# Patient Record
Sex: MALE | Race: BLACK OR AFRICAN AMERICAN | Employment: OTHER | URBAN - METROPOLITAN AREA
[De-identification: names, ages, dates, MRNs, and addresses within clinical notes are randomized per-mention and may not be internally consistent; named-entity substitution may affect disease eponyms.]

---

## 2017-02-04 ENCOUNTER — APPOINTMENT (OUTPATIENT)
Dept: CT IMAGING | Age: 62
End: 2017-02-04
Attending: NURSE PRACTITIONER
Payer: COMMERCIAL

## 2017-02-04 ENCOUNTER — HOSPITAL ENCOUNTER (EMERGENCY)
Age: 62
Discharge: OTHER INSTITUTION | End: 2017-02-04
Attending: EMERGENCY MEDICINE
Payer: COMMERCIAL

## 2017-02-04 ENCOUNTER — HOSPITAL ENCOUNTER (INPATIENT)
Age: 62
LOS: 3 days | Discharge: HOME | DRG: 378 | End: 2017-02-07
Attending: EMERGENCY MEDICINE | Admitting: INTERNAL MEDICINE
Payer: COMMERCIAL

## 2017-02-04 VITALS
HEIGHT: 67 IN | WEIGHT: 232 LBS | OXYGEN SATURATION: 98 % | SYSTOLIC BLOOD PRESSURE: 125 MMHG | RESPIRATION RATE: 19 BRPM | DIASTOLIC BLOOD PRESSURE: 82 MMHG | BODY MASS INDEX: 36.41 KG/M2 | HEART RATE: 75 BPM | TEMPERATURE: 97.9 F

## 2017-02-04 DIAGNOSIS — K57.32 SIGMOID DIVERTICULITIS: Primary | ICD-10-CM

## 2017-02-04 DIAGNOSIS — K92.2 GASTROINTESTINAL HEMORRHAGE, UNSPECIFIED GASTROINTESTINAL HEMORRHAGE TYPE: Primary | ICD-10-CM

## 2017-02-04 LAB
ABO + RH BLD: NORMAL
ALBUMIN SERPL BCP-MCNC: 3.1 G/DL (ref 3.5–5)
ALBUMIN/GLOB SERPL: 0.9 {RATIO} (ref 1.1–2.2)
ALP SERPL-CCNC: 74 U/L (ref 45–117)
ALT SERPL-CCNC: 21 U/L (ref 12–78)
ANION GAP BLD CALC-SCNC: 7 MMOL/L (ref 5–15)
APPEARANCE UR: CLEAR
AST SERPL W P-5'-P-CCNC: 17 U/L (ref 15–37)
BACTERIA URNS QL MICRO: NEGATIVE /HPF
BASOPHILS # BLD AUTO: 0 K/UL (ref 0–0.1)
BASOPHILS # BLD: 0 % (ref 0–1)
BILIRUB SERPL-MCNC: 0.4 MG/DL (ref 0.2–1)
BILIRUB UR QL: NEGATIVE
BLOOD GROUP ANTIBODIES SERPL: NORMAL
BUN SERPL-MCNC: 15 MG/DL (ref 6–20)
BUN/CREAT SERPL: 11 (ref 12–20)
CALCIUM SERPL-MCNC: 7.7 MG/DL (ref 8.5–10.1)
CHLORIDE SERPL-SCNC: 105 MMOL/L (ref 97–108)
CO2 SERPL-SCNC: 28 MMOL/L (ref 21–32)
COLOR UR: ABNORMAL
CREAT SERPL-MCNC: 1.31 MG/DL (ref 0.7–1.3)
EOSINOPHIL # BLD: 0.1 K/UL (ref 0–0.4)
EOSINOPHIL NFR BLD: 1 % (ref 0–7)
EPITH CASTS URNS QL MICRO: ABNORMAL /LPF
ERYTHROCYTE [DISTWIDTH] IN BLOOD BY AUTOMATED COUNT: 15.2 % (ref 11.5–14.5)
ERYTHROCYTE [DISTWIDTH] IN BLOOD BY AUTOMATED COUNT: 15.3 % (ref 11.5–14.5)
ERYTHROCYTE [DISTWIDTH] IN BLOOD BY AUTOMATED COUNT: 15.6 % (ref 11.5–14.5)
GLOBULIN SER CALC-MCNC: 3.5 G/DL (ref 2–4)
GLUCOSE SERPL-MCNC: 137 MG/DL (ref 65–100)
GLUCOSE UR STRIP.AUTO-MCNC: NEGATIVE MG/DL
HCT VFR BLD AUTO: 37.4 % (ref 36.6–50.3)
HCT VFR BLD AUTO: 37.6 % (ref 36.6–50.3)
HCT VFR BLD AUTO: 41.6 % (ref 36.6–50.3)
HEMOCCULT STL QL: NEGATIVE
HEMOCCULT STL QL: NEGATIVE
HEMOCCULT STL QL: POSITIVE
HGB BLD-MCNC: 11.2 G/DL (ref 12.1–17)
HGB BLD-MCNC: 11.4 G/DL (ref 12.1–17)
HGB BLD-MCNC: 11.7 G/DL (ref 12.1–17)
HGB BLD-MCNC: 13.1 G/DL (ref 12.1–17)
HGB UR QL STRIP: NEGATIVE
INR PPP: 1.1 (ref 0.9–1.1)
KETONES UR QL STRIP.AUTO: ABNORMAL MG/DL
LEUKOCYTE ESTERASE UR QL STRIP.AUTO: ABNORMAL
LYMPHOCYTES # BLD AUTO: 12 % (ref 12–49)
LYMPHOCYTES # BLD AUTO: 14 % (ref 12–49)
LYMPHOCYTES # BLD AUTO: 14 % (ref 12–49)
LYMPHOCYTES # BLD: 1.1 K/UL (ref 0.8–3.5)
LYMPHOCYTES # BLD: 1.4 K/UL (ref 0.8–3.5)
LYMPHOCYTES # BLD: 1.6 K/UL (ref 0.8–3.5)
MCH RBC QN AUTO: 26.3 PG (ref 26–34)
MCH RBC QN AUTO: 26.8 PG (ref 26–34)
MCH RBC QN AUTO: 26.8 PG (ref 26–34)
MCHC RBC AUTO-ENTMCNC: 30.5 G/DL (ref 30–36.5)
MCHC RBC AUTO-ENTMCNC: 31.1 G/DL (ref 30–36.5)
MCHC RBC AUTO-ENTMCNC: 31.5 G/DL (ref 30–36.5)
MCV RBC AUTO: 85.2 FL (ref 80–99)
MCV RBC AUTO: 86 FL (ref 80–99)
MCV RBC AUTO: 86.4 FL (ref 80–99)
MONOCYTES # BLD: 0.5 K/UL (ref 0–1)
MONOCYTES # BLD: 0.6 K/UL (ref 0–1)
MONOCYTES # BLD: 0.7 K/UL (ref 0–1)
MONOCYTES NFR BLD AUTO: 5 % (ref 5–13)
MONOCYTES NFR BLD AUTO: 6 % (ref 5–13)
MONOCYTES NFR BLD AUTO: 6 % (ref 5–13)
NEUTS SEG # BLD: 7.8 K/UL (ref 1.8–8)
NEUTS SEG # BLD: 8.2 K/UL (ref 1.8–8)
NEUTS SEG # BLD: 8.6 K/UL (ref 1.8–8)
NEUTS SEG NFR BLD AUTO: 79 % (ref 32–75)
NEUTS SEG NFR BLD AUTO: 79 % (ref 32–75)
NEUTS SEG NFR BLD AUTO: 82 % (ref 32–75)
NITRITE UR QL STRIP.AUTO: NEGATIVE
PH UR STRIP: 5.5 [PH] (ref 5–8)
PLATELET # BLD AUTO: 167 K/UL (ref 150–400)
PLATELET # BLD AUTO: 172 K/UL (ref 150–400)
PLATELET # BLD AUTO: 192 K/UL (ref 150–400)
POTASSIUM SERPL-SCNC: 4.6 MMOL/L (ref 3.5–5.1)
PROT SERPL-MCNC: 6.6 G/DL (ref 6.4–8.2)
PROT UR STRIP-MCNC: NEGATIVE MG/DL
PROTHROMBIN TIME: 10.9 SEC (ref 9–11.1)
RBC # BLD AUTO: 4.33 M/UL (ref 4.1–5.7)
RBC # BLD AUTO: 4.37 M/UL (ref 4.1–5.7)
RBC # BLD AUTO: 4.88 M/UL (ref 4.1–5.7)
RBC #/AREA URNS HPF: ABNORMAL /HPF (ref 0–5)
SODIUM SERPL-SCNC: 140 MMOL/L (ref 136–145)
SP GR UR REFRACTOMETRY: 1.02 (ref 1–1.03)
SPECIMEN EXP DATE BLD: NORMAL
UA: UC IF INDICATED,UAUC: ABNORMAL
UROBILINOGEN UR QL STRIP.AUTO: 1 EU/DL (ref 0.2–1)
WBC # BLD AUTO: 10.3 K/UL (ref 4.1–11.1)
WBC # BLD AUTO: 11 K/UL (ref 4.1–11.1)
WBC # BLD AUTO: 9.5 K/UL (ref 4.1–11.1)
WBC URNS QL MICRO: ABNORMAL /HPF (ref 0–4)

## 2017-02-04 PROCEDURE — 82272 OCCULT BLD FECES 1-3 TESTS: CPT | Performed by: NURSE PRACTITIONER

## 2017-02-04 PROCEDURE — 74011250636 HC RX REV CODE- 250/636: Performed by: NURSE PRACTITIONER

## 2017-02-04 PROCEDURE — 74011000258 HC RX REV CODE- 258: Performed by: NURSE PRACTITIONER

## 2017-02-04 PROCEDURE — 86900 BLOOD TYPING SEROLOGIC ABO: CPT | Performed by: EMERGENCY MEDICINE

## 2017-02-04 PROCEDURE — 96360 HYDRATION IV INFUSION INIT: CPT

## 2017-02-04 PROCEDURE — 85610 PROTHROMBIN TIME: CPT | Performed by: NURSE PRACTITIONER

## 2017-02-04 PROCEDURE — 99285 EMERGENCY DEPT VISIT HI MDM: CPT

## 2017-02-04 PROCEDURE — 74011250636 HC RX REV CODE- 250/636: Performed by: EMERGENCY MEDICINE

## 2017-02-04 PROCEDURE — 85025 COMPLETE CBC W/AUTO DIFF WBC: CPT | Performed by: NURSE PRACTITIONER

## 2017-02-04 PROCEDURE — 80053 COMPREHEN METABOLIC PANEL: CPT | Performed by: NURSE PRACTITIONER

## 2017-02-04 PROCEDURE — 96365 THER/PROPH/DIAG IV INF INIT: CPT

## 2017-02-04 PROCEDURE — 81001 URINALYSIS AUTO W/SCOPE: CPT | Performed by: NURSE PRACTITIONER

## 2017-02-04 PROCEDURE — 74011636320 HC RX REV CODE- 636/320: Performed by: EMERGENCY MEDICINE

## 2017-02-04 PROCEDURE — 74011000250 HC RX REV CODE- 250: Performed by: INTERNAL MEDICINE

## 2017-02-04 PROCEDURE — 74174 CTA ABD&PLVS W/CONTRAST: CPT

## 2017-02-04 PROCEDURE — 36415 COLL VENOUS BLD VENIPUNCTURE: CPT | Performed by: EMERGENCY MEDICINE

## 2017-02-04 PROCEDURE — C9113 INJ PANTOPRAZOLE SODIUM, VIA: HCPCS | Performed by: NURSE PRACTITIONER

## 2017-02-04 PROCEDURE — 96361 HYDRATE IV INFUSION ADD-ON: CPT

## 2017-02-04 PROCEDURE — 36415 COLL VENOUS BLD VENIPUNCTURE: CPT | Performed by: NURSE PRACTITIONER

## 2017-02-04 PROCEDURE — 85018 HEMOGLOBIN: CPT | Performed by: INTERNAL MEDICINE

## 2017-02-04 PROCEDURE — 74011000258 HC RX REV CODE- 258: Performed by: INTERNAL MEDICINE

## 2017-02-04 PROCEDURE — 96375 TX/PRO/DX INJ NEW DRUG ADDON: CPT

## 2017-02-04 PROCEDURE — 85025 COMPLETE CBC W/AUTO DIFF WBC: CPT | Performed by: EMERGENCY MEDICINE

## 2017-02-04 PROCEDURE — 65660000000 HC RM CCU STEPDOWN

## 2017-02-04 PROCEDURE — C9113 INJ PANTOPRAZOLE SODIUM, VIA: HCPCS | Performed by: INTERNAL MEDICINE

## 2017-02-04 PROCEDURE — 74011250636 HC RX REV CODE- 250/636: Performed by: INTERNAL MEDICINE

## 2017-02-04 RX ORDER — SODIUM CHLORIDE 0.9 % (FLUSH) 0.9 %
5-10 SYRINGE (ML) INJECTION EVERY 8 HOURS
Status: DISCONTINUED | OUTPATIENT
Start: 2017-02-04 | End: 2017-02-07 | Stop reason: HOSPADM

## 2017-02-04 RX ORDER — ONDANSETRON 2 MG/ML
4 INJECTION INTRAMUSCULAR; INTRAVENOUS
Status: DISCONTINUED | OUTPATIENT
Start: 2017-02-04 | End: 2017-02-07 | Stop reason: HOSPADM

## 2017-02-04 RX ORDER — SODIUM CHLORIDE 9 MG/ML
250 INJECTION, SOLUTION INTRAVENOUS CONTINUOUS
Status: DISCONTINUED | OUTPATIENT
Start: 2017-02-04 | End: 2017-02-04 | Stop reason: HOSPADM

## 2017-02-04 RX ORDER — THERA TABS 400 MCG
1 TAB ORAL DAILY
COMMUNITY
End: 2017-02-21

## 2017-02-04 RX ORDER — SODIUM CHLORIDE 0.9 % (FLUSH) 0.9 %
10 SYRINGE (ML) INJECTION
Status: COMPLETED | OUTPATIENT
Start: 2017-02-04 | End: 2017-02-04

## 2017-02-04 RX ORDER — SODIUM CHLORIDE 0.9 % (FLUSH) 0.9 %
5-10 SYRINGE (ML) INJECTION AS NEEDED
Status: DISCONTINUED | OUTPATIENT
Start: 2017-02-04 | End: 2017-02-07 | Stop reason: HOSPADM

## 2017-02-04 RX ORDER — PANTOPRAZOLE SODIUM 40 MG/10ML
40 INJECTION, POWDER, LYOPHILIZED, FOR SOLUTION INTRAVENOUS
Status: COMPLETED | OUTPATIENT
Start: 2017-02-04 | End: 2017-02-04

## 2017-02-04 RX ORDER — METRONIDAZOLE 500 MG/100ML
500 INJECTION, SOLUTION INTRAVENOUS EVERY 8 HOURS
Status: DISCONTINUED | OUTPATIENT
Start: 2017-02-04 | End: 2017-02-06

## 2017-02-04 RX ORDER — SODIUM CHLORIDE 450 MG/100ML
100 INJECTION, SOLUTION INTRAVENOUS CONTINUOUS
Status: DISCONTINUED | OUTPATIENT
Start: 2017-02-04 | End: 2017-02-07

## 2017-02-04 RX ORDER — LEVOFLOXACIN 5 MG/ML
500 INJECTION, SOLUTION INTRAVENOUS EVERY 24 HOURS
Status: DISCONTINUED | OUTPATIENT
Start: 2017-02-04 | End: 2017-02-06

## 2017-02-04 RX ADMIN — SODIUM CHLORIDE 1000 ML: 900 INJECTION, SOLUTION INTRAVENOUS at 15:31

## 2017-02-04 RX ADMIN — Medication 10 ML: at 22:00

## 2017-02-04 RX ADMIN — SODIUM CHLORIDE 3.38 G: 900 INJECTION, SOLUTION INTRAVENOUS at 13:52

## 2017-02-04 RX ADMIN — SODIUM CHLORIDE 1000 ML: 900 INJECTION, SOLUTION INTRAVENOUS at 09:30

## 2017-02-04 RX ADMIN — PANTOPRAZOLE SODIUM 40 MG: 40 INJECTION, POWDER, FOR SOLUTION INTRAVENOUS at 11:27

## 2017-02-04 RX ADMIN — LEVOFLOXACIN 500 MG: 5 INJECTION, SOLUTION INTRAVENOUS at 21:22

## 2017-02-04 RX ADMIN — SODIUM CHLORIDE 125 ML/HR: 450 INJECTION, SOLUTION INTRAVENOUS at 21:18

## 2017-02-04 RX ADMIN — METRONIDAZOLE 500 MG: 500 INJECTION, SOLUTION INTRAVENOUS at 23:03

## 2017-02-04 RX ADMIN — Medication 10 ML: at 13:02

## 2017-02-04 RX ADMIN — IOPAMIDOL 100 ML: 755 INJECTION, SOLUTION INTRAVENOUS at 13:02

## 2017-02-04 RX ADMIN — SODIUM CHLORIDE 250 ML/HR: 900 INJECTION, SOLUTION INTRAVENOUS at 11:32

## 2017-02-04 RX ADMIN — SODIUM CHLORIDE 8 MG/HR: 900 INJECTION, SOLUTION INTRAVENOUS at 21:13

## 2017-02-04 NOTE — ED NOTES
TRANSFER - OUT REPORT:    Verbal report given to Gisell Todd RN(name) on Suresh Rowe  [facility] care       Report consisted of patients Situation, Background, Assessment and   Recommendations(SBAR). Information from the following report(s) SBAR, ED Summary, [facility] and Recent Results was reviewed with the receiving nurse. Lines:   Peripheral IV 02/04/17 Right Antecubital (Active)   Site Assessment Clean, dry, & intact 2/4/2017  9:30 AM   Phlebitis Assessment 0 2/4/2017  9:30 AM   Infiltration Assessment 0 2/4/2017  9:30 AM   Dressing Status Clean, dry, & intact 2/4/2017  9:30 AM        Opportunity for questions and clarification was provided.       Patient transported with:   Monitor

## 2017-02-04 NOTE — ED NOTES
Patient states he has been seeing blood in his stool since about 10:00 PM last night, states he has had 4 BM since last night that are diarrhea and states instead of a BM he states it just blood

## 2017-02-04 NOTE — ED NOTES
Emergency Department Nursing Plan of Care       The Nursing Plan of Care is developed from the Nursing assessment and Emergency Department Attending provider initial evaluation. The plan of care may be reviewed in the ED Provider note.     The Plan of Care was developed with the following considerations:   Patient / Family readiness to learn indicated by:verbalized understanding  Persons(s) to be included in education: patient  Barriers to Learning/Limitations:No    Signed     Marge Ch RN    2/4/2017   9:08 AM

## 2017-02-04 NOTE — IP AVS SNAPSHOT
Current Discharge Medication List  
  
Take these medications at their scheduled times Dose & Instructions Dispensing Information Comments Morning Noon Evening Bedtime  
 amLODIPine 5 mg tablet Commonly known as:  Cameron Squires Your next dose is: Today, Tomorrow Other:  ____________ Dose:  5 mg Take 1 Tab by mouth daily. Indications: hypertension Quantity:  30 Tab Refills:  1  
     
   
   
   
  
 levoFLOXacin 500 mg tablet Commonly known as:  Graeme Stacy Your next dose is: Today, Tomorrow Other:  ____________ Dose:  500 mg Take 1 Tab by mouth every twenty-four (24) hours for 7 days. Quantity:  7 Tab Refills:  0  
     
   
   
   
  
 metroNIDAZOLE 500 mg tablet Commonly known as:  FLAGYL Your next dose is: Today, Tomorrow Other:  ____________ Dose:  500 mg Take 1 Tab by mouth three (3) times daily for 8 days. Quantity:  24 Tab Refills:  0  
     
   
   
   
  
 pantoprazole 40 mg tablet Commonly known as:  PROTONIX Your next dose is: Today, Tomorrow Other:  ____________ Dose:  40 mg Take 1 Tab by mouth Daily (before breakfast). Quantity:  30 Tab Refills:  1 therapeutic multivitamin tablet Commonly known as:  [facility] Your next dose is: Today, Tomorrow Other:  ____________ Dose:  1 Tab Take 1 Tab by mouth daily. Refills:  0 Where to Get Your Medications Information about where to get these medications is not yet available ! Ask your nurse or doctor about these medications  
  amLODIPine 5 mg tablet  
 levoFLOXacin 500 mg tablet  
 metroNIDAZOLE 500 mg tablet  
 pantoprazole 40 mg tablet

## 2017-02-04 NOTE — ED NOTES
TRANSFER - OUT REPORT:    Verbal report given to [employer] (name) on Lizbeth Nicholas  being transferred to [facility]. (unit) for routine progression of care       Report consisted of patients Situation, Background, Assessment and   Recommendations(SBAR). Information from the following report(s) SBAR was reviewed with the receiving nurse. Lines:   Peripheral IV 02/04/17 Right Antecubital (Active)   Site Assessment Clean, dry, & intact 2/4/2017  9:30 AM   Phlebitis Assessment 0 2/4/2017  9:30 AM   Infiltration Assessment 0 2/4/2017  9:30 AM   Dressing Status Clean, dry, & intact 2/4/2017  9:30 AM        Opportunity for questions and clarification was provided.       Patient transported with:   Monitor

## 2017-02-04 NOTE — ED TRIAGE NOTES
Triage: Pt arrives as transfer from [facility] [place] [place] for GI bleed.

## 2017-02-04 NOTE — PROGRESS NOTES
Admission Medication Reconciliation:    Information obtained from: patient interview, rx query    Significant PMH/Disease States:   Past Medical History   Diagnosis Date    Arthritis      OSTEO    Gastrointestinal disorder      acid reflux    GERD (gastroesophageal reflux disease)     History of seasonal allergies      \"HAY FEVER\"    Hypertension     Unspecified sleep apnea      USES CPAP       Chief Complaint for this Admission:  rectal bleeding    Allergies:  Review of patient's allergies indicates no known allergies. Prior to Admission Medications:   Prior to Admission Medications   Prescriptions Last Dose Informant Patient Reported? Taking? amLODIPine (NORVASC) 5 mg tablet 2/1/2017 at Unknown time  No Yes   Sig: Take 1 Tab by mouth daily. Indications: HYPERTENSION   pantoprazole (PROTONIX) 40 mg tablet 2/3/2017 at 10pm  No Yes   Sig: Take 1 Tab by mouth Daily (before breakfast). therapeutic multivitamin (THERAGRAN) tablet 2/3/2017 at Unknown time  Yes Yes   Sig: Take 1 Tab by mouth daily. Facility-Administered Medications: None     Comments/Recommendations: All medications/allergies have been reviewed and updated; last medication administration times reviewed and recorded. Pt states he ran out of his Norvasc, last dose two days ago. Changes made to Prior to Admission (PTA) Medication List:     Medications Added:  - MVI     Medications Changed:  - None     Medications Removed:  - None    Thank you for allowing me to participate in the care of this patient. If there are any further questions, please contact the pharmacy at  or the medication reconciliation pharmacist at . Brice Mas, Pharm. D., [facility]

## 2017-02-04 NOTE — IP AVS SNAPSHOT
[address] [facility] 
[phone] Patient: Saul Richardson MRN: [id number] SJ You are allergic to the following No active allergies Recent Documentation Height Weight BMI Smoking Status 1.702 m 99.3 kg 34.29 kg/m2 Never Smoker Unresulted Labs Order Current Status SAMPLE TO BLOOD BANK In process Emergency Contacts Name Discharge Info Relation Home Work Mobile Zuleyka Saenz  Girlfriend [18]   [phone] Angela Rodriguez  Other Relative [6]   [phone] About your hospitalization You were admitted on:  2017 You last received care in the:  [facility] NEURO-SCI TELE You were discharged on:  2017 Unit phone number:  [phone] Why you were hospitalized Your primary diagnosis was:  Gi Bleed Your diagnoses also included:  Diverticulitis Of Sigmoid Colon Providers Seen During Your Hospitalizations Provider Role Specialty Primary office phone Barbara Siemens, MD Attending Provider Emergency Medicine [phone] Bridgette Dee MD Attending Provider Internal Medicine [phone] Leticia Sanchez MD Attending Provider Internal Medicine [phone] Sarika Garcia MD Attending Provider Internal Medicine [phone] Your Primary Care Physician (PCP) Primary Care Physician Office Phone Office Fax Say MILLS [phone] Follow-up Information Follow up With Details Comments Contact Info Anjana Nova MD  As needed Nikol[address] AZAR [address]. 
 
[facility] [address] [id number] 
[phone] Sandy Verde MD In 4 weeks  [address] [address] [address] [phone] Current Discharge Medication List  
  
START taking these medications Dose & Instructions Dispensing Information Comments Morning Noon Evening Bedtime  
 levoFLOXacin 500 mg tablet Commonly known as:  Aida Foote Your next dose is: Today, Tomorrow Other:  _________ Dose:  500 mg Take 1 Tab by mouth every twenty-four (24) hours for 7 days. Quantity:  7 Tab Refills:  0  
     
   
   
   
  
 metroNIDAZOLE 500 mg tablet Commonly known as:  FLAGYL Your next dose is: Today, Tomorrow Other:  _________ Dose:  500 mg Take 1 Tab by mouth three (3) times daily for 8 days. Quantity:  24 Tab Refills:  0 CONTINUE these medications which have NOT CHANGED Dose & Instructions Dispensing Information Comments Morning Noon Evening Bedtime  
 amLODIPine 5 mg tablet Commonly known as:  Elian Webb Your next dose is: Today, Tomorrow Other:  _________ Dose:  5 mg Take 1 Tab by mouth daily. Indications: hypertension Quantity:  30 Tab Refills:  1  
     
   
   
   
  
 pantoprazole 40 mg tablet Commonly known as:  PROTONIX Your next dose is: Today, Tomorrow Other:  _________ Dose:  40 mg Take 1 Tab by mouth Daily (before breakfast). Quantity:  30 Tab Refills:  1 therapeutic multivitamin tablet Commonly known as:  [facility] Your next dose is: Today, Tomorrow Other:  _________ Dose:  1 Tab Take 1 Tab by mouth daily. Refills:  0 Where to Get Your Medications Information on where to get these meds will be given to you by the nurse or doctor. ! Ask your nurse or doctor about these medications  
  amLODIPine 5 mg tablet  
 levoFLOXacin 500 mg tablet  
 metroNIDAZOLE 500 mg tablet  
 pantoprazole 40 mg tablet Discharge Instructions Discharge Instructions PATIENT ID: Daisha Culp MRN: [id number] YOB: 1955 DATE OF ADMISSION: 2/4/2017  3:10 PM   
 DATE OF DISCHARGE: 2/6/2017 PRIMARY CARE PROVIDER: Flossie Goldberg, MD  
ATTENDING PHYSICIAN: Jaylen Lobato MD 
DISCHARGING PROVIDER: Vamshi Dover NP. To contact this individual call [phone] and ask the  to page. If unavailable ask to be transferred the Adult Hospitalist Department. DISCHARGE DIAGNOSES Diverticulitis with Bleeding CONSULTATIONS: IP CONSULT TO GASTROENTEROLOGY 
IP CONSULT TO HOSPITALIST FOLLOW UP APPOINTMENTS:  
Follow-up Information Follow up With Details Comments Contact Info Flossie Goldberg, MD  As needed Nikol[address] AZAR 97. 
 
[facility] [address] [id number] 
[phone] Lenard Ching MD In 4 weeks  [address] [place] [address] [address] [place] [address] Avenue 
[phone] ADDITIONAL CARE RECOMMENDATIONS:  
Complete full course of antibiotics: both levaquin and flagyl Call Dr. Anna Chisholm office if: 
- you have bloody or black tarry stools  
- you develop fevers or intensifying abdominal pain DIET: GI light, low fiber diet for next 5-7 days. Add fiber back to diet and strive for higher fiber foods once pain is resolved. - Avoid foods with nuts or seeds. NO Popcorn unless kernel/gauthier free. ACTIVITY: Activity as tolerated · It is important that you take the medication exactly as they are prescribed. · Keep your medication in the bottles provided by the pharmacist and keep a list of the medication names, dosages, and times to be taken in your wallet. · Do not take other medications without consulting your doctor. NOTIFY YOUR PHYSICIAN FOR ANY OF THE FOLLOWING:  
Fever over 101 degrees for 24 hours. Chest pain, shortness of breath, fever, chills, nausea, vomiting, diarrhea, change in mentation, falling, weakness, bleeding. Severe pain or pain not relieved by medications. Or, any other signs or symptoms that you may have questions about.  
 
DISPOSITION: 
  Home With: 
 OT  PT  [facility]  RN  
  
 SNF/Inpatient Rehab/LTAC  
 Independent/assisted living Hospice  
xx Other: Home CDMP Checked:  
Yes *x* PROBLEM LIST Updated: 
Yes *x* Signed:  
Tita Morejon NP 
2/6/2017 
8:32 PM 
 
 
Discharge Orders None Introducing [facility]! Jonathan Feldman introduces [employer] patient portal. Now you can access parts of your medical record, email your doctor's office, and request medication refills online. 1. In your internet browser, go to [email or web address] 2. Click on the First Time User? Click Here link in the Sign In box. You will see the New Member Sign Up page. 3. Enter your [employer] Access Code exactly as it appears below. You will not need to use this code after youve completed the sign-up process. If you do not sign up before the expiration date, you must request a new code. · [employer] Access Code: [facility] Expires: 5/5/2017  8:56 AM 
 
4. Enter the last four digits of your Social Security Number (xxxx) and Date of Birth (mm/dd/yyyy) as indicated and click Submit. You will be taken to the next sign-up page. 5. Create a [employer] ID. This will be your [employer] login ID and cannot be changed, so think of one that is secure and easy to remember. 6. Create a [employer] password. You can change your password at any time. 7. Enter your Password Reset Question and Answer. This can be used at a later time if you forget your password. 8. Enter your e-mail address. You will receive e-mail notification when new information is available in [address] 9. Click Sign Up. You can now view and download portions of your medical record. 10. Click the Download Summary menu link to download a portable copy of your medical information. If you have questions, please visit the Frequently Asked Questions section of the [employer] website. Remember, [employer] is NOT to be used for urgent needs. For medical emergencies, dial 911. Now available from your iPhone and Android! General Information Please provide this summary of care documentation to your next provider. Patient Signature:  ____________________________________________________________ Date:  ____________________________________________________________  
  
[place]  Provider Signature:  ____________________________________________________________ Date:  ____________________________________________________________

## 2017-02-04 NOTE — PROGRESS NOTES
TRANSFER - IN REPORT:    Verbal report received from Dari(name) on Endy Wiley  being received from ER(unit) for routine progression of care      Report consisted of patients Situation, Background, Assessment and   Recommendations(SBAR). Information from the following report(s) SBAR was reviewed with the receiving nurse. Opportunity for questions and clarification was provided. Assessment completed upon patients arrival to unit and care assumed.

## 2017-02-04 NOTE — H&P
History & Physical    Primary Care Provider: Vin Pyle MD  Source of Information: Patient     History of Presenting Illness:   Jeff Guzman is a 63 y.o. male who presents with bloody stools  Pt started to have bloody stools with clots since last night   Was seen in [facility] and as tx here  Pt denies any abd pain or NSAID use     The patient denies any fever, chills, chest pain, cough, congestion, recent illness, palpitations, or dysuria. In ed, hb stable     Gi been consulted       Review of Systems:  A comprehensive review of systems was negative except for that written in the History of Present Illness. Past Medical History   Diagnosis Date    Arthritis      OSTEO    Gastrointestinal disorder      acid reflux    GERD (gastroesophageal reflux disease)     History of seasonal allergies      \"HAY FEVER\"    Hypertension     Unspecified sleep apnea      USES CPAP      Past Surgical History   Procedure Laterality Date    Hx other surgical       left eye implant     Hx other surgical       KIDNEY STONES REMOVED    Hx tonsil and adenoidectomy      Hx orthopaedic       BOOGIE KNEES    Hx orthopaedic       2014 rt thumb nerve release    Pr abdomen surgery proc unlisted       Gall bladder removed 2014     Prior to Admission medications    Medication Sig Start Date End Date Taking? Authorizing Provider   therapeutic multivitamin [facility]) tablet Take 1 Tab by mouth daily. Yes Historical Provider   pantoprazole (PROTONIX) 40 mg tablet Take 1 Tab by mouth Daily (before breakfast). 7/18/16  Yes Britney Brown NP   amLODIPine (NORVASC) 5 mg tablet Take 1 Tab by mouth daily.  Indications: HYPERTENSION 7/17/16  Yes Emma Armando NP     No Known Allergies   Family History   Problem Relation Age of Onset    Diabetes Mother     Cancer Father      THROAT    Diabetes Sister     Diabetes Maternal Aunt     Alzheimer Maternal Uncle     Diabetes Maternal Aunt SOCIAL HISTORY:  Patient resides:  Independently x   Assisted Living    SNF    With family care       Smoking history:   None x   Former    Chronic      Alcohol history:   None x   Social    Chronic      Ambulates:   Independently x   w/cane    w/walker    w/wc    CODE STATUS:  DNR    Full x   Other      Objective:     Physical Exam:   PHYSICAL EXAM:  VITALS:    Visit Vitals    /76    Pulse 71    Temp 97.7 °F (36.5 °C)    Resp 18    Ht 5' 7\" (1.702 m)    Wt 105.7 kg (233 lb)    SpO2 99%    BMI 36.49 kg/m2     General:  Alert, cooperative, no distress, appears stated age. Head:  Normocephalic, without obvious abnormality, atraumatic. Eyes:  Conjunctivae/corneas clear. PERRL, EOMs intact. Nose:  Nares normal. Septum midline. Mucosa normal. No drainage or sinus tenderness. Throat:  Lips, mucosa, and tongue normal. Teeth and gums normal.    Neck:  Supple, symmetrical, trachea midline, no adenopathy, thyroid: no enlargement/tenderness/nodules, no carotid bruit and no JVD. Back:  Symmetric, no curvature. ROM normal. No CVA tenderness. Lungs:  Clear to auscultation bilaterally. Chest wall:  No tenderness or deformity. Heart:  Regular rate and rhythm, S1, S2 normal, no murmur, click, rub or gallop. Abdomen:  Soft, non-tender. Bowel sounds normal. No masses, No organomegaly. Obese    Extremities:  Extremities normal, atraumatic, no cyanosis or edema. Pulses:  2+ and symmetric all extremities. Skin:  Skin color, texture, turgor normal. No rashes or lesions    Neurologic:  CNII-XII intact.  Moves all extremity  No focal deficits       LAB DATA REVIEWED:    Recent Results (from the past 24 hour(s))   CBC WITH AUTOMATED DIFF    Collection Time: 02/04/17  9:30 AM   Result Value Ref Range    WBC 9.5 4.1 - 11.1 K/uL    RBC 4.88 4.10 - 5.70 M/uL    HGB 13.1 12.1 - 17.0 g/dL    HCT 41.6 36.6 - 50.3 %    MCV 85.2 80.0 - 99.0 FL    MCH 26.8 26.0 - 34.0 PG    MCHC 31.5 30.0 - 36.5 g/dL RDW 15.6 (H) 11.5 - 14.5 %    PLATELET 988 796 - 826 K/uL    NEUTROPHILS 82 (H) 32 - 75 %    LYMPHOCYTES 12 12 - 49 %    MONOCYTES 5 5 - 13 %    EOSINOPHILS 1 0 - 7 %    BASOPHILS 0 0 - 1 %    ABS. NEUTROPHILS 7.8 1.8 - 8.0 K/UL    ABS. LYMPHOCYTES 1.1 0.8 - 3.5 K/UL    ABS. MONOCYTES 0.5 0.0 - 1.0 K/UL    ABS. EOSINOPHILS 0.1 0.0 - 0.4 K/UL    ABS. BASOPHILS 0.0 0.0 - 0.1 K/UL   METABOLIC PANEL, COMPREHENSIVE    Collection Time: 02/04/17  9:30 AM   Result Value Ref Range    Sodium 140 136 - 145 mmol/L    Potassium 4.6 3.5 - 5.1 mmol/L    Chloride 105 97 - 108 mmol/L    CO2 28 21 - 32 mmol/L    Anion gap 7 5 - 15 mmol/L    Glucose 137 (H) 65 - 100 mg/dL    BUN 15 6 - 20 MG/DL    Creatinine 1.31 (H) 0.70 - 1.30 MG/DL    BUN/Creatinine ratio 11 (L) 12 - 20      GFR est AA >60 >60 ml/min/1.73m2    GFR est non-AA 56 (L) >60 ml/min/1.73m2    Calcium 7.7 (L) 8.5 - 10.1 MG/DL    Bilirubin, total 0.4 0.2 - 1.0 MG/DL    ALT (SGPT) 21 12 - 78 U/L    AST (SGOT) 17 15 - 37 U/L    Alk.  phosphatase 74 45 - 117 U/L    Protein, total 6.6 6.4 - 8.2 g/dL    Albumin 3.1 (L) 3.5 - 5.0 g/dL    Globulin 3.5 2.0 - 4.0 g/dL    A-G Ratio 0.9 (L) 1.1 - 2.2     PROTHROMBIN TIME + INR    Collection Time: 02/04/17  9:30 AM   Result Value Ref Range    INR 1.1 0.9 - 1.1      Prothrombin time 10.9 9.0 - 11.1 sec   OCCULT BLOOD, STOOL    Collection Time: 02/04/17  9:30 AM   Result Value Ref Range    Occult blood, stool NEGATIVE  NEG     URINALYSIS W/ REFLEX CULTURE    Collection Time: 02/04/17 10:30 AM   Result Value Ref Range    Color DARK YELLOW      Appearance CLEAR CLEAR      Specific gravity 1.025 1.003 - 1.030      pH (UA) 5.5 5.0 - 8.0      Protein NEGATIVE  NEG mg/dL    Glucose NEGATIVE  NEG mg/dL    Ketone TRACE (A) NEG mg/dL    Bilirubin NEGATIVE  NEG      Blood NEGATIVE  NEG      Urobilinogen 1.0 0.2 - 1.0 EU/dL    Nitrites NEGATIVE  NEG      Leukocyte Esterase TRACE (A) NEG      WBC 0-4 0 - 4 /hpf    RBC 0-5 0 - 5 /hpf    Epithelial cells FEW FEW /lpf    Bacteria NEGATIVE  NEG /hpf    UA:UC IF INDICATED CULTURE NOT INDICATED BY UA RESULT CNI     OCCULT BLOOD, STOOL    Collection Time: 02/04/17 11:36 AM   Result Value Ref Range    Occult blood, stool NEGATIVE  NEG     OCCULT BLOOD, STOOL    Collection Time: 02/04/17 12:15 PM   Result Value Ref Range    Occult blood, stool POSITIVE (A) NEG     CBC WITH AUTOMATED DIFF    Collection Time: 02/04/17 12:38 PM   Result Value Ref Range    WBC 10.3 4.1 - 11.1 K/uL    RBC 4.33 4.10 - 5.70 M/uL    HGB 11.4 (L) 12.1 - 17.0 g/dL    HCT 37.4 36.6 - 50.3 %    MCV 86.4 80.0 - 99.0 FL    MCH 26.3 26.0 - 34.0 PG    MCHC 30.5 30.0 - 36.5 g/dL    RDW 15.3 (H) 11.5 - 14.5 %    PLATELET 375 112 - 705 K/uL    NEUTROPHILS 79 (H) 32 - 75 %    LYMPHOCYTES 14 12 - 49 %    MONOCYTES 6 5 - 13 %    EOSINOPHILS 1 0 - 7 %    BASOPHILS 0 0 - 1 %    ABS. NEUTROPHILS 8.2 (H) 1.8 - 8.0 K/UL    ABS. LYMPHOCYTES 1.4 0.8 - 3.5 K/UL    ABS. MONOCYTES 0.6 0.0 - 1.0 K/UL    ABS. EOSINOPHILS 0.1 0.0 - 0.4 K/UL    ABS. BASOPHILS 0.0 0.0 - 0.1 K/UL   TYPE & SCREEN    Collection Time: 02/04/17  3:19 PM   Result Value Ref Range    Crossmatch Expiration 02/07/2017     ABO/Rh(D) Maxcine Kya POSITIVE     Antibody screen NEG    CBC WITH AUTOMATED DIFF    Collection Time: 02/04/17  3:19 PM   Result Value Ref Range    WBC 11.0 4.1 - 11.1 K/uL    RBC 4.37 4.10 - 5.70 M/uL    HGB 11.7 (L) 12.1 - 17.0 g/dL    HCT 37.6 36.6 - 50.3 %    MCV 86.0 80.0 - 99.0 FL    MCH 26.8 26.0 - 34.0 PG    MCHC 31.1 30.0 - 36.5 g/dL    RDW 15.2 (H) 11.5 - 14.5 %    PLATELET 474 605 - 883 K/uL    NEUTROPHILS 79 (H) 32 - 75 %    LYMPHOCYTES 14 12 - 49 %    MONOCYTES 6 5 - 13 %    EOSINOPHILS 1 0 - 7 %    BASOPHILS 0 0 - 1 %    ABS. NEUTROPHILS 8.6 (H) 1.8 - 8.0 K/UL    ABS. LYMPHOCYTES 1.6 0.8 - 3.5 K/UL    ABS. MONOCYTES 0.7 0.0 - 1.0 K/UL    ABS. EOSINOPHILS 0.1 0.0 - 0.4 K/UL    ABS.  BASOPHILS 0.0 0.0 - 0.1 K/UL             Data Review:     Recent Days:  Recent Labs 02/04/17   1519  02/04/17   1238  02/04/17   0930   WBC  11.0  10.3  9.5   HGB  11.7*  11.4*  13.1   HCT  37.6  37.4  41.6   PLT  172  167  192     Recent Labs      02/04/17   0930   NA  140   K  4.6   CL  105   CO2  28   GLU  137*   BUN  15   CREA  1.31*   CA  7.7*   ALB  3.1*   SGOT  17   ALT  21   INR  1.1     No results for input(s): PH, PCO2, PO2, HCO3, FIO2 in the last 72 hours. 24 Hour Results:  Recent Results (from the past 24 hour(s))   CBC WITH AUTOMATED DIFF    Collection Time: 02/04/17  9:30 AM   Result Value Ref Range    WBC 9.5 4.1 - 11.1 K/uL    RBC 4.88 4.10 - 5.70 M/uL    HGB 13.1 12.1 - 17.0 g/dL    HCT 41.6 36.6 - 50.3 %    MCV 85.2 80.0 - 99.0 FL    MCH 26.8 26.0 - 34.0 PG    MCHC 31.5 30.0 - 36.5 g/dL    RDW 15.6 (H) 11.5 - 14.5 %    PLATELET 214 396 - 713 K/uL    NEUTROPHILS 82 (H) 32 - 75 %    LYMPHOCYTES 12 12 - 49 %    MONOCYTES 5 5 - 13 %    EOSINOPHILS 1 0 - 7 %    BASOPHILS 0 0 - 1 %    ABS. NEUTROPHILS 7.8 1.8 - 8.0 K/UL    ABS. LYMPHOCYTES 1.1 0.8 - 3.5 K/UL    ABS. MONOCYTES 0.5 0.0 - 1.0 K/UL    ABS. EOSINOPHILS 0.1 0.0 - 0.4 K/UL    ABS. BASOPHILS 0.0 0.0 - 0.1 K/UL   METABOLIC PANEL, COMPREHENSIVE    Collection Time: 02/04/17  9:30 AM   Result Value Ref Range    Sodium 140 136 - 145 mmol/L    Potassium 4.6 3.5 - 5.1 mmol/L    Chloride 105 97 - 108 mmol/L    CO2 28 21 - 32 mmol/L    Anion gap 7 5 - 15 mmol/L    Glucose 137 (H) 65 - 100 mg/dL    BUN 15 6 - 20 MG/DL    Creatinine 1.31 (H) 0.70 - 1.30 MG/DL    BUN/Creatinine ratio 11 (L) 12 - 20      GFR est AA >60 >60 ml/min/1.73m2    GFR est non-AA 56 (L) >60 ml/min/1.73m2    Calcium 7.7 (L) 8.5 - 10.1 MG/DL    Bilirubin, total 0.4 0.2 - 1.0 MG/DL    ALT (SGPT) 21 12 - 78 U/L    AST (SGOT) 17 15 - 37 U/L    Alk.  phosphatase 74 45 - 117 U/L    Protein, total 6.6 6.4 - 8.2 g/dL    Albumin 3.1 (L) 3.5 - 5.0 g/dL    Globulin 3.5 2.0 - 4.0 g/dL    A-G Ratio 0.9 (L) 1.1 - 2.2     PROTHROMBIN TIME + INR    Collection Time: 02/04/17  9:30 AM   Result Value Ref Range    INR 1.1 0.9 - 1.1      Prothrombin time 10.9 9.0 - 11.1 sec   OCCULT BLOOD, STOOL    Collection Time: 02/04/17  9:30 AM   Result Value Ref Range    Occult blood, stool NEGATIVE  NEG     URINALYSIS W/ REFLEX CULTURE    Collection Time: 02/04/17 10:30 AM   Result Value Ref Range    Color DARK YELLOW      Appearance CLEAR CLEAR      Specific gravity 1.025 1.003 - 1.030      pH (UA) 5.5 5.0 - 8.0      Protein NEGATIVE  NEG mg/dL    Glucose NEGATIVE  NEG mg/dL    Ketone TRACE (A) NEG mg/dL    Bilirubin NEGATIVE  NEG      Blood NEGATIVE  NEG      Urobilinogen 1.0 0.2 - 1.0 EU/dL    Nitrites NEGATIVE  NEG      Leukocyte Esterase TRACE (A) NEG      WBC 0-4 0 - 4 /hpf    RBC 0-5 0 - 5 /hpf    Epithelial cells FEW FEW /lpf    Bacteria NEGATIVE  NEG /hpf    UA:UC IF INDICATED CULTURE NOT INDICATED BY UA RESULT CNI     OCCULT BLOOD, STOOL    Collection Time: 02/04/17 11:36 AM   Result Value Ref Range    Occult blood, stool NEGATIVE  NEG     OCCULT BLOOD, STOOL    Collection Time: 02/04/17 12:15 PM   Result Value Ref Range    Occult blood, stool POSITIVE (A) NEG     CBC WITH AUTOMATED DIFF    Collection Time: 02/04/17 12:38 PM   Result Value Ref Range    WBC 10.3 4.1 - 11.1 K/uL    RBC 4.33 4.10 - 5.70 M/uL    HGB 11.4 (L) 12.1 - 17.0 g/dL    HCT 37.4 36.6 - 50.3 %    MCV 86.4 80.0 - 99.0 FL    MCH 26.3 26.0 - 34.0 PG    MCHC 30.5 30.0 - 36.5 g/dL    RDW 15.3 (H) 11.5 - 14.5 %    PLATELET 114 546 - 645 K/uL    NEUTROPHILS 79 (H) 32 - 75 %    LYMPHOCYTES 14 12 - 49 %    MONOCYTES 6 5 - 13 %    EOSINOPHILS 1 0 - 7 %    BASOPHILS 0 0 - 1 %    ABS. NEUTROPHILS 8.2 (H) 1.8 - 8.0 K/UL    ABS. LYMPHOCYTES 1.4 0.8 - 3.5 K/UL    ABS. MONOCYTES 0.6 0.0 - 1.0 K/UL    ABS. EOSINOPHILS 0.1 0.0 - 0.4 K/UL    ABS.  BASOPHILS 0.0 0.0 - 0.1 K/UL   TYPE & SCREEN    Collection Time: 02/04/17  3:19 PM   Result Value Ref Range    Crossmatch Expiration 02/07/2017     ABO/Rh(D) O POSITIVE     Antibody screen NEG    CBC WITH AUTOMATED DIFF    Collection Time: 02/04/17  3:19 PM   Result Value Ref Range    WBC 11.0 4.1 - 11.1 K/uL    RBC 4.37 4.10 - 5.70 M/uL    HGB 11.7 (L) 12.1 - 17.0 g/dL    HCT 37.6 36.6 - 50.3 %    MCV 86.0 80.0 - 99.0 FL    MCH 26.8 26.0 - 34.0 PG    MCHC 31.1 30.0 - 36.5 g/dL    RDW 15.2 (H) 11.5 - 14.5 %    PLATELET 340 875 - 422 K/uL    NEUTROPHILS 79 (H) 32 - 75 %    LYMPHOCYTES 14 12 - 49 %    MONOCYTES 6 5 - 13 %    EOSINOPHILS 1 0 - 7 %    BASOPHILS 0 0 - 1 %    ABS. NEUTROPHILS 8.6 (H) 1.8 - 8.0 K/UL    ABS. LYMPHOCYTES 1.6 0.8 - 3.5 K/UL    ABS. MONOCYTES 0.7 0.0 - 1.0 K/UL    ABS. EOSINOPHILS 0.1 0.0 - 0.4 K/UL    ABS.  BASOPHILS 0.0 0.0 - 0.1 K/UL         Imaging:   CT acute sigmoid diverticulitis     Assessment:     Principal Problem:    GI bleed (2/4/2017)           Plan:     GI bleed POA  Likely from divertiluclitis  - NPo   - monitor h & h   - PPI  - gi on board     HTN   - hold meds      Diet:NPO  Activity:   DVT prophylaxis: scds  Isolation precautions: none  Consultations: GI   Anticipated disposition:        Signed By: Quin Barger MD     February 4, 2017

## 2017-02-04 NOTE — ED PROVIDER NOTES
HPI Comments: Marleen Griffin is a 63 y.o. male presents to [facility] ED with cc of blood in his stool noted 10pm last night had four bowel movements since then states'now stool is just blood' HX GI bleed taking protonix   Patient specifically denies fever, chills, nausea, vomitng, chest pain,abdominal pain shortness of breath, headache, rash, diarrhea ,sweating or weight loss. PCP: Martina Cornejo MD GI Dr Mane Gay      Patient is a 63 y.o. male presenting with anal bleeding. The history is provided by the patient. No  was used. Rectal Bleeding    This is a new problem. The current episode started 6 to 12 hours ago. Stool description: bloody. Pertinent negatives include no abdominal pain, no dysuria, no chills, no fever, no back pain and no vomiting. He has tried nothing for the symptoms. Past Medical History:   Diagnosis Date    Arthritis      OSTEO    Gastrointestinal disorder      acid reflux    GERD (gastroesophageal reflux disease)     History of seasonal allergies      \"HAY FEVER\"    Hypertension     Unspecified sleep apnea      USES CPAP       Past Surgical History:   Procedure Laterality Date    Hx other surgical       left eye implant     Hx other surgical       KIDNEY STONES REMOVED    Hx tonsil and adenoidectomy      Hx orthopaedic       BOOGIE KNEES    Hx orthopaedic       2014 rt thumb nerve release    Pr abdomen surgery proc unlisted       Gall bladder removed 2014         Family History:   Problem Relation Age of Onset    Diabetes Mother     Cancer Father      THROAT    Diabetes Sister     Diabetes Maternal Aunt     Alzheimer Maternal Uncle     Diabetes Maternal Aunt        Social History     Social History    Marital status: SINGLE     Spouse name: N/A    Number of children: N/A    Years of education: N/A     Occupational History    Not on file.      Social History Main Topics    Smoking status: Never Smoker    Smokeless tobacco: Never Used   [facility] Alcohol use No    Drug use: No    Sexual activity: Not on file     Other Topics Concern    Not on file     Social History Narrative         ALLERGIES: Review of patient's allergies indicates no known allergies. Review of Systems   Constitutional: Negative for chills, fatigue and fever. HENT: Negative for congestion and sore throat. Eyes: Negative for redness. Respiratory: Negative for cough, chest tightness and wheezing. Cardiovascular: Negative for chest pain. Gastrointestinal: Positive for blood in stool. Negative for abdominal pain and vomiting. Genitourinary: Negative for dysuria. Musculoskeletal: Negative for arthralgias, back pain, myalgias, neck pain and neck stiffness. Skin: Negative for rash. Neurological: Negative for dizziness, syncope, weakness, light-headedness, numbness and headaches. Hematological: Negative for adenopathy. Psychiatric/Behavioral: Negative for agitation and behavioral problems. All other systems reviewed and are negative. Vitals:    02/04/17 1133 02/04/17 1136 02/04/17 1138 02/04/17 1200   BP: 127/74 128/78 135/82 132/79   Pulse: 77 72 82 72   Resp:  18 14 19   Temp:       SpO2:  97% 97% 99%   Weight:       Height:                Physical Exam   Constitutional: He is oriented to person, place, and time. He appears well-developed and well-nourished. HENT:   Head: Normocephalic and atraumatic. Right Ear: External ear normal.   Mouth/Throat: Oropharynx is clear and moist.   Eyes: Conjunctivae are normal. Right eye exhibits no discharge. Left eye exhibits no discharge. Neck: Normal range of motion. Neck supple. Cardiovascular: Normal rate, regular rhythm and normal heart sounds. Pulmonary/Chest: Effort normal and breath sounds normal. No respiratory distress. He has no wheezes. Abdominal: Soft. Bowel sounds are normal. There is no tenderness. Genitourinary: Rectal exam shows guaiac positive stool.    Musculoskeletal: Normal range of motion. He exhibits no edema. Lymphadenopathy:     He has no cervical adenopathy. Neurological: He is alert and oriented to person, place, and time. No cranial nerve deficit. Skin: Skin is warm and dry. Psychiatric: He has a normal mood and affect. His behavior is normal. Judgment and thought content normal.   Nursing note and vitals reviewed. MDM  Number of Diagnoses or Management Options  Sigmoid diverticulitis:   Diagnosis management comments: DDX gastritis PUD rectal mass        Amount and/or Complexity of Data Reviewed  Clinical lab tests: ordered and reviewed  Tests in the radiology section of CPT®: ordered and reviewed  Discuss the patient with other providers: yes      ED Course       Procedures  1240 large amount of bloody stool noted in bedside comode  Discussed with Dr bAdi Gilbert ED [facility] order ct angio call with results  Patient is being transferred to [facility]  ED , transfer accepted by Dr Owens Reach report discussed   The reasons for their transfer have been discussed with them and available family.  They convey agreement and understanding for the need to be transferred as explained to them by this provider    IMPRESSION  Sigmoid diverticulitis  PLAN  Transfer to [facility] ED

## 2017-02-04 NOTE — ED PROVIDER NOTES
HPI Comments: 63 y.o. male with past medical history significant for GERD, HTN, arthritis, sleep apnea, and seasonal allergies who presents from [facility] as a transfer via EMS with chief complaint of rectal bleeding. Pt states he noticed dark, black stool with some bright red blood last night at 2000. Pt has had 6 episodes of the stool with his last episode occurring at [facility] prior to being transferred. Pt also complains of mild lower abdominal and flank pain, intermittent right-sided chest pain and SOB. Pt states chest pain feels \"like gas\" and is similar to the pain he experienced with his last ulcer in July 2016. Pt denies radiation elsewhere. Pt states he is compliant with medications for his ulcers. Pt recently had a normal cardiac stress test. Pt denies a history of angina and MI. Pt denies seeing a cardiologist. Pt denies taking asa, NSAIDs and naproxen. Pt denies n/v, diaphoresis, fever and chills. There are no other acute medical concerns at this time. Old Chart Review:  Pt was seen at [facility] today with reports of 4 bloody bowel movements since 2000 last night. Pt was transferred here for CT angio. Pt's CT at [facility] indicated acute sigmoid diverticulitis. No fluid collection to suggest abscess. No evidence of active GI bleeding. Small periumbilical hernia. Pt was given Protonix, Zosyn, and 2L of fluid. Social hx: denies tobacco use; denies EtOH use; denies illicit drug use  PCP: Haleigh De Jesus MD  GI: Dr. Phyllis Casanova    Note written by Jose Membreno, as dictated by Alberto Torres MD 3:26 PM     The history is provided by the patient.         Past Medical History:   Diagnosis Date    Arthritis      OSTEO    Gastrointestinal disorder      acid reflux    GERD (gastroesophageal reflux disease)     History of seasonal allergies      \"HAY FEVER\"    Hypertension     Unspecified sleep apnea      USES CPAP       Past Surgical History: Procedure Laterality Date    Hx other surgical       left eye implant     Hx other surgical       KIDNEY STONES REMOVED    Hx tonsil and adenoidectomy      Hx orthopaedic       BOOGIE KNEES    Hx orthopaedic       2014 rt thumb nerve release    Pr abdomen surgery proc unlisted       Gall bladder removed 2014         Family History:   Problem Relation Age of Onset    Diabetes Mother     Cancer Father      THROAT    Diabetes Sister     Diabetes Maternal Aunt     Alzheimer Maternal Uncle     Diabetes Maternal Aunt        Social History     Social History    Marital status: SINGLE     Spouse name: N/A    Number of children: N/A    Years of education: N/A     Occupational History    Not on file. Social History Main Topics    Smoking status: Never Smoker    Smokeless tobacco: Never Used    Alcohol use No    Drug use: No    Sexual activity: Not on file     Other Topics Concern    Not on file     Social History Narrative         ALLERGIES: Review of patient's allergies indicates no known allergies. Review of Systems   Constitutional: Negative for chills, diaphoresis and fever. HENT: Negative for congestion, postnasal drip, rhinorrhea and sore throat. Eyes: Negative for photophobia, discharge, redness and visual disturbance. Respiratory: Positive for shortness of breath. Negative for cough, chest tightness and wheezing. Cardiovascular: Positive for chest pain. Negative for palpitations and leg swelling. Gastrointestinal: Positive for abdominal pain, anal bleeding and blood in stool. Negative for abdominal distention, constipation, diarrhea, nausea and vomiting. Genitourinary: Negative for difficulty urinating, dysuria, frequency, hematuria and urgency. Musculoskeletal: Negative for arthralgias, back pain, joint swelling and myalgias. Skin: Negative for color change and rash.    Neurological: Negative for dizziness, speech difficulty, weakness, light-headedness, numbness and headaches. Psychiatric/Behavioral: Negative for confusion. The patient is not nervous/anxious. All other systems reviewed and are negative. There were no vitals filed for this visit. Physical Exam   Constitutional: He is oriented to person, place, and time. He appears well-developed and well-nourished. No distress. HENT:   Head: Normocephalic and atraumatic. Right Ear: External ear normal.   Left Ear: External ear normal.   Nose: Nose normal.   Mouth/Throat: Oropharynx is clear and moist.   Eyes: Conjunctivae and EOM are normal. Pupils are equal, round, and reactive to light. No scleral icterus. Neck: Normal range of motion. Neck supple. No JVD present. No tracheal deviation present. No thyromegaly present. Cardiovascular: Normal rate, regular rhythm and normal heart sounds. Exam reveals no gallop and no friction rub. No murmur heard. Pulmonary/Chest: Effort normal and breath sounds normal. No respiratory distress. He has no decreased breath sounds. He has no wheezes. He has no rhonchi. He has no rales. He exhibits no tenderness. Abdominal: Soft. He exhibits no distension and no mass. Bowel sounds are decreased. There is no tenderness. There is no rebound and no guarding. Musculoskeletal: Normal range of motion. He exhibits no edema or tenderness. Lymphadenopathy:     He has no cervical adenopathy. Neurological: He is alert and oriented to person, place, and time. He has normal strength. He displays no atrophy and no tremor. No cranial nerve deficit. He exhibits normal muscle tone. Coordination and gait normal.   Skin: Skin is warm and dry. No rash noted. He is not diaphoretic. No erythema. Psychiatric: He has a normal mood and affect. His behavior is normal. Judgment and thought content normal.   Nursing note and vitals reviewed.   Note written by Jose Plata, as dictated by Daryle Lamer, MD 3:26 PM       MDM  Number of Diagnoses or Management Options  Gastrointestinal hemorrhage, unspecified gastrointestinal hemorrhage type:   Diagnosis management comments: Impression:  GI bleed with component of melena and dark red blood. Hx of PUD and on CT show diverticulitis (not osis and not right sided findings). Thus consider a repeat upper GI bleed with brisk bleeding to account for the dark red blood. Will discuss with Dr. Angela Muniz And admit to hospitalist service. ED Course       Procedures  CONSULT NOTE:  3:45 PM Stephan Manriquez MD spoke with Dr. Asif Onofre, Consult for Gastroenterology. Discussed available diagnostic tests and clinical findings. He is in agreement with care plans as outlined. Dr. Asif Onofre recommends evaluating the patient in the ED. CONSULT NOTE:  Ivelisse Elizabeth MD spoke with Dr. Patricia Barrios, Consult for Hospitalist.  Discussed available diagnostic tests and clinical findings. She is in agreement with care plans as outlined. Dr. Patricia Barrios recommends admitting the patient.

## 2017-02-05 PROBLEM — I10 HYPERTENSION: Chronic | Status: ACTIVE | Noted: 2017-02-05

## 2017-02-05 PROBLEM — K57.32 DIVERTICULITIS OF SIGMOID COLON: Status: ACTIVE | Noted: 2017-02-05

## 2017-02-05 LAB
HGB BLD-MCNC: 10.5 G/DL (ref 12.1–17)
HGB BLD-MCNC: 9.1 G/DL (ref 12.1–17)
HGB BLD-MCNC: 9.3 G/DL (ref 12.1–17)
HGB BLD-MCNC: 9.3 G/DL (ref 12.1–17)

## 2017-02-05 PROCEDURE — 36415 COLL VENOUS BLD VENIPUNCTURE: CPT | Performed by: INTERNAL MEDICINE

## 2017-02-05 PROCEDURE — 74011000258 HC RX REV CODE- 258: Performed by: INTERNAL MEDICINE

## 2017-02-05 PROCEDURE — 74011250636 HC RX REV CODE- 250/636: Performed by: INTERNAL MEDICINE

## 2017-02-05 PROCEDURE — 65660000000 HC RM CCU STEPDOWN

## 2017-02-05 PROCEDURE — 85018 HEMOGLOBIN: CPT | Performed by: INTERNAL MEDICINE

## 2017-02-05 PROCEDURE — C9113 INJ PANTOPRAZOLE SODIUM, VIA: HCPCS | Performed by: INTERNAL MEDICINE

## 2017-02-05 PROCEDURE — 74011000250 HC RX REV CODE- 250: Performed by: INTERNAL MEDICINE

## 2017-02-05 RX ADMIN — SODIUM CHLORIDE 8 MG/HR: 900 INJECTION, SOLUTION INTRAVENOUS at 22:57

## 2017-02-05 RX ADMIN — METRONIDAZOLE 500 MG: 500 INJECTION, SOLUTION INTRAVENOUS at 15:30

## 2017-02-05 RX ADMIN — Medication 10 ML: at 22:57

## 2017-02-05 RX ADMIN — SODIUM CHLORIDE 8 MG/HR: 900 INJECTION, SOLUTION INTRAVENOUS at 17:43

## 2017-02-05 RX ADMIN — METRONIDAZOLE 500 MG: 500 INJECTION, SOLUTION INTRAVENOUS at 07:20

## 2017-02-05 RX ADMIN — SODIUM CHLORIDE 8 MG/HR: 900 INJECTION, SOLUTION INTRAVENOUS at 00:52

## 2017-02-05 RX ADMIN — Medication 10 ML: at 06:15

## 2017-02-05 RX ADMIN — SODIUM CHLORIDE 8 MG/HR: 900 INJECTION, SOLUTION INTRAVENOUS at 06:30

## 2017-02-05 RX ADMIN — METRONIDAZOLE 500 MG: 500 INJECTION, SOLUTION INTRAVENOUS at 22:58

## 2017-02-05 RX ADMIN — LEVOFLOXACIN 500 MG: 5 INJECTION, SOLUTION INTRAVENOUS at 17:45

## 2017-02-05 RX ADMIN — SODIUM CHLORIDE 8 MG/HR: 900 INJECTION, SOLUTION INTRAVENOUS at 12:17

## 2017-02-05 RX ADMIN — Medication 5 ML: at 14:00

## 2017-02-05 NOTE — PROGRESS NOTES
0327: pt had large bright red loose bloody bowel movement in commode, 's, 's. Pt denies pain at this time  0549: pt had medium red melena/bloody stool in the bed/floor while attempting to use the urinal  0635: pt had another medium bright red & melena/bloody stool, became diaphoretic and dizzy when getting back to bed from bathroom, 's  0735: Bedside and Verbal shift change report given to Francesco Ko (oncoming nurse) by Andres Valentine RN (offgoing nurse). Report included the following information SBAR, Kardex, Intake/Output, MAR, Accordion, Recent Results, Med Rec Status and Cardiac Rhythm siuns rhythm/sinus tach.

## 2017-02-05 NOTE — PROGRESS NOTES
63 yr old male presents with LLQ pain and hematochezia. CTA consistent with acute sigmoid diverticulitis. We will treat him with antibiotics. Clear liquid diet. Follow serial hemoglobins and transfuse if needed. We will re-evaluate tomorrow.

## 2017-02-05 NOTE — PROGRESS NOTES
Problem: Falls - Risk of  Goal: *Absence of falls  Outcome: Progressing Towards Goal  chato scale complete, pt instructed to use call bell for assistance when needed. Call bell and bedside table within reach. Slip resistant foot wear provided/applied.   Bed rails up x2    Problem: Pressure Ulcer - Risk of  Goal: *Prevention of pressure ulcer  Outcome: Progressing Towards Goal  Skin intact, pt turns self- self care    Problem: Upper and Lower GI Bleed: Day 1  Goal: *Optimal pain control at patients stated goal  Outcome: Progressing Towards Goal  Pt denies pain at this time  Goal: *Hemodynamically stable  Outcome: Progressing Towards Goal  Sinus rhythm, -130's  Goal: *Demonstrates progressive activity  Outcome: Progressing Towards Goal  Up ad mihir, self care

## 2017-02-05 NOTE — CONSULTS
[address]   [address] [facility], [address]   [address] [facility]       Name:  Rupesh Barkley   MR#:  [id number]   :  1955   Account #:  [de-identified]    Date of Consultation:  2017   Date of Adm:  2017       REASON FOR CONSULTATION: Dr. Isak Lerner asked   us to evaluate the patient for hematochezia and left lower quadrant   pain. CHIEF COMPLAINT:  Hematochezia for one day. HISTORY OF PRESENT ILLNESS: The patient is a 53-year-old male   who presented to the emergency room with complaints of multiple   episodes of hematochezia since last night. He noticed moderate left   lower quadrant pain which began abruptly yesterday evening. Pain   was crampy and sharp. The pain is associated with multiple episodes   of hematochezia when he passed dark red blood. He states that he   had at least 4-6 episodes of hematochezia yesterday and one episode   today. He was seen at [facility] and was then   transferred to [facility] for further evaluation. A CT was   performed, which revealed acute sigmoid diverticulitis and he has   already been started on antibiotics by Dr. Elena Akins. PAST MEDICAL HISTORY: Significant for osteoarthritis, GERD, hay   fever, hypertension, sleep apnea, left eye implant, kidney stones,   tonsillectomy and adenoidectomy, orthopedic surgeries,   cholecystectomy in . MEDICATIONS AT HOME INCLUDE   1. Bobby Terry. 2. Protonix. 3. Norvasc. FAMILY HISTORY: His mother had diabetes and father had throat   cancer. SOCIAL HISTORY: He does not smoke or drink alcohol. REVIEW OF SYSTEMS   CONSTITUTIONAL: Negative for anorexia, fever, weight loss. GASTROINTESTINAL: Positive for abdominal pain. Positive for   hematochezia. RESPIRATORY: No cough or expectoration. CARDIOVASCULAR: No chest pain, syncope, palpitations. CENTRAL NERVOUS SYSTEM:  No history of seizures or loss   consciousness.    Review of lymphatic, hematologic, musculoskeletal, genitourinary,   endocrine and metabolic systems revealed no abnormalities. All other   systems are negative. PHYSICAL EXAMINATION   GENERAL: He is alert, comfortable. VITAL SIGNS: Blood pressure 134/71 pulse of 71, temperature 97.7. Height   is 5 feet 7 inches. HEAD, EYES, EARS, NOSE AND THROAT:  Pupils are equal, react to   light. Extraocular movements normal.   NECK: Supple. There is no carotid bruit or jugular venous distention. CHEST: Clear to auscultation. No crackles or wheeze. CARDIAC: Regular rate and rhythm. First and second sounds normal.   ABDOMEN: Soft. There is mild tenderness in the left lower quadrant. There is no rigidity, no rebound, no palpable masses. Bowel sounds   are present. CENTRAL NERVOUS SYSTEM: He is alert and oriented x3. There is   no gross sensory or motor neurological deficit. EXTREMITIES: Negative for cyanosis, clubbing or edema. LABORATORY DATA: White count is 11.0, hemoglobin of 11.7,   platelets of 034. Glucose 137, creatinine is 1.31. BUN is 15. Liver   enzymes are normal.    ASSESSMENT: A 56-year-old male has presented with a history of left   lower quadrant pain and multiple episodes of hematochezia. CT   angiogram is consistent with acute sigmoid diverticulitis. We will treat   him with IV Levaquin and Flagyl as already ordered by Dr. Neris Dacosta. We will also start him on clear liquids. If his pain   subsides, his diet will be advanced as tolerated. We will also follow his   hemoglobins and transfuse if his hemoglobin drops significantly. RECOMMENDATIONS   1. Continue Levaquin and Flagyl in prescribed doses. 2. Clear liquids p.o.   3. Follow serial hemoglobins and transfuse. 4. Dr. Ching Wright partners will resume care on Monday. Thank you for the consultation.         Jerry Boston MD      PK / DV   D:  02/04/2017   21:41   T:  02/05/2017   00:14   Job #:  [id number]

## 2017-02-05 NOTE — PROGRESS NOTES
Hospitalist Progress Note  Lazara Olivares NP  Office: [phone]  Cell: [phone]      Date of Service:  2017  NAME:  Daniela Meyer  :  1955  MRN:  [id number]      Admission Summary:   Daniela Meyer is a 63 y.o. male who was transferred from [facility] on 17. Pt had 4 episodes of hematochezia and LLQ pain with passage of bloody stool. Abd ct scan revealed acute sigmoid diverticulitis. Interval history / Subjective:      - noted pt had bloody bm last night per nursing notes, continue serial hgb, transfuse as necessary    Assessment & Plan:     Acute GI bleed (POA) likely 2/2 acute sigmoid diverticulitis  - ct abd => Acute sigmoid diverticulitis. No fluid collection to suggest abscess. No  evidence of active GI bleeding. Small periumbilical hernia. - gi following, normally follows with Dr. Lisa Alcantara  - clear liquids  - protonix drip, levaquin, flagyl  - hh q6 hours    GIO (POA)  - suspect prerenal with fluid loss  - cr 1.31 on admission, continue to hydrate and recheck tomorrow    HTN   - hold home medication in anticipation of possible hypotension 2/2 acute blood loss    Code status: Full  DVT prophylaxis: SCDs  Care Plan discussed with: patient, Dr. Christian Macias  Disposition: TBD, lives home alone     Hospital Problems  Date Reviewed: 2017          Codes Class Noted POA    * (Principal)GI bleed ICD-10-CM: K92.2  ICD-9-CM: 578.9  2017 Unknown                Review of Systems:   Respiratory: negative  Cardiovascular: negative  Gastrointestinal: positive for hematochezia  Genitourinary:negative   Denies any fever/chills. Vital Signs:    Last 24hrs VS reviewed since prior progress note.  Most recent are:  Visit Vitals    /59 (BP 1 Location: Left arm, BP Patient Position: At rest)    Pulse (!) 106    Temp 98.7 °F (37.1 °C)    Resp 17    Ht 5' 7\" (1.702 m)    Wt 97.5 kg (214 lb 15.2 oz)    SpO2 98%    BMI 33.67 kg/m2 Intake/Output Summary (Last 24 hours) at 02/05/17 5156  Last data filed at 02/05/17 0400   Gross per 24 hour   Intake          1225.33 ml   Output              850 ml   Net           375.33 ml        Physical Examination:             Constitutional:  No acute distress, cooperative, pleasant    ENT:  Oral mucous moist, oropharynx benign. Neck supple,    Resp:  CTA bilaterally. No wheezing/rhonchi/rales. No accessory muscle use   CV:  Regular rhythm, normal rate, no murmurs, gallops, rubs. SR on tele    GI:  Soft, non distended, + bilateral lower quadrants, r>l. Normoactive bowel sounds, no hepatosplenomegaly     Musculoskeletal:  No edema, warm, 2+ pulses throughout. 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 AAOx3, CN II-XII reviewed              Psych:  Good insight, Not anxious nor agitated. Skin:  Good turgor, no rashes or ulcers       Data Review:    Review and/or order of clinical lab test  Review and/or order of tests in the radiology section of CPT  Review and/or order of tests in the medicine section of CPT      Labs:     Recent Labs      02/05/17   0348  02/04/17   2129  02/04/17   1519  02/04/17   1238   WBC   --    --   11.0  10.3   HGB  10.5*  11.2*  11.7*  11.4*   HCT   --    --   37.6  37.4   PLT   --    --   172  167     Recent Labs      02/04/17   0930   NA  140   K  4.6   CL  105   CO2  28   BUN  15   CREA  1.31*   GLU  137*   CA  7.7*     Recent Labs      02/04/17   0930   SGOT  17   ALT  21   AP  74   TBILI  0.4   TP  6.6   ALB  3.1*   GLOB  3.5     Recent Labs      02/04/17   0930   INR  1.1   PTP  10.9      No results for input(s): FE, TIBC, PSAT, FERR in the last 72 hours. No results found for: FOL, RBCF   No results for input(s): PH, PCO2, PO2 in the last 72 hours. No results for input(s): CPK, CKNDX, TROIQ in the last 72 hours.     No lab exists for component: CPKMB  No results found for: CHOL, [address], CHLST, [address], HDL, LDL, DLDL, LDLC, DLDLP, TGL, TGLX, TRIGL, TRIGP, CHHD, CHHDX  Lab Results   Component Value Date/Time    Glucose (POC) 104 11/30/2010 06:10 PM     Lab Results   Component Value Date/Time    Color DARK YELLOW 02/04/2017 10:30 AM    Appearance CLEAR 02/04/2017 10:30 AM    Specific gravity 1.025 02/04/2017 10:30 AM    pH (UA) 5.5 02/04/2017 10:30 AM    Protein NEGATIVE  02/04/2017 10:30 AM    Glucose NEGATIVE  02/04/2017 10:30 AM    Ketone TRACE 02/04/2017 10:30 AM    Bilirubin NEGATIVE  02/04/2017 10:30 AM    Urobilinogen 1.0 02/04/2017 10:30 AM    Nitrites NEGATIVE  02/04/2017 10:30 AM    Leukocyte Esterase TRACE 02/04/2017 10:30 AM    Epithelial cells FEW 02/04/2017 10:30 AM    Bacteria NEGATIVE  02/04/2017 10:30 AM    WBC 0-4 02/04/2017 10:30 AM    RBC 0-5 02/04/2017 10:30 AM         Medications Reviewed:     Current Facility-Administered Medications   Medication Dose Route Frequency    0.45% sodium chloride infusion  125 mL/hr IntraVENous CONTINUOUS    pantoprazole (PROTONIX) 40 mg in 0.9% sodium chloride (MBP/ADV) 50 mL  8 mg/hr IntraVENous CONTINUOUS    levoFLOXacin (LEVAQUIN) 500 mg in D5W IVPB  500 mg IntraVENous Q24H    metroNIDAZOLE (FLAGYL) IVPB premix 500 mg  500 mg IntraVENous Q8H    sodium chloride (NS) flush 5-10 mL  5-10 mL IntraVENous Q8H    sodium chloride (NS) flush 5-10 mL  5-10 mL IntraVENous PRN    ondansetron (ZOFRAN) injection 4 mg  4 mg IntraVENous Q4H PRN     ______________________________________________________________________  EXPECTED LENGTH OF STAY: - - -  ACTUAL LENGTH OF STAY:          1                 Marline Connors V, NP

## 2017-02-05 NOTE — PROGRESS NOTES
[address] [facility] [address], [address] [facility]                GI PROGRESS NOTE        NAME:   Soo Guadarrama       :    1955       MRN:    [id number]     Assessment/Plan   1. LGI bleed: likley secondary to acute diverticulitis. Bleeding appears to be slowing down. Continue antibiotics for 10 days. Dr Min Marshall partners to follow in AM.  2. Acute blood loss anemia: Hb 9.1,follow serial hemoglobins and transfuse if needed. 3. HTN     Patient Active Problem List   Diagnosis Code    GI bleed K92.2    Hypertension I10    Diverticulitis of sigmoid colon K57.32       Subjective:     Soo Guadarrama is a 63 y.o.  male who was admitted with LGI bleed     Review of Systems    Constitutional: negative fever, negative chills, negative weight loss  Eyes:   negative visual changes  ENT:   negative sore throat, tongue or lip swelling  Respiratory:  negative cough, negative dyspnea  Cards:  negative for chest pain, palpitations, lower extremity edema  GI:   See HPI  :  negative for frequency, dysuria  Integument:  negative for rash and pruritus  Heme:  negative for easy bruising and gum/nose bleeding  Musculoskel: negative for myalgias,  back pain and muscle weakness  Neuro: negative for headaches, dizziness, vertigo  Psych:  negative for feelings of anxiety, depression           Objective:     VITALS:   Last 24hrs VS reviewed since prior hospitalist progress note.  Most recent are:  Visit Vitals    /77 (BP 1 Location: Left arm, BP Patient Position: At rest)    Pulse 88    Temp 97.6 °F (36.4 °C)    Resp 20    Ht 5' 7\" (1.702 m)    Wt 97.5 kg (214 lb 15.2 oz)    SpO2 98%    BMI 33.67 kg/m2       Intake/Output Summary (Last 24 hours) at 17 1528  Last data filed at 17 0720   Gross per 24 hour   Intake          1730.33 ml   Output              850 ml   Net           880.33 ml        PHYSICAL EXAM:  General   well developed, well nourished, appears stated age, in no acute distress  EENT  Normocephalic, Atraumatic, PERRLA, EOMI, sclera clear, nares clear, pharynx normal  Neck   Supple without nodes or mass. No thyromegaly or bruit  Respiratory   Clear To Auscultation bilaterally - no wheezes, rales, rhonchi, or crackles  Cardiology  Regular Rate and Rythmn  - no murmurs, rubs or gallops  Abdominal  Soft, non-tender, non-distended, positive bowel sounds, no hepatosplenomegaly, no palpable mass  Extremities  No clubbing, cyanosis, or edema. Pulses intact. Skin  Normal skin turgor. No rashes or skin ulcers noted  Neurological  No focal neurological deficits noted  Psychological  Oriented x 3. Normal affect.        Lab Data   Recent Results (from the past 12 hour(s))   HEMOGLOBIN    Collection Time: 02/05/17  3:48 AM   Result Value Ref Range    HGB 10.5 (L) 12.1 - 17.0 g/dL   HEMOGLOBIN    Collection Time: 02/05/17 10:56 AM   Result Value Ref Range    HGB 9.1 (L) 12.1 - 17.0 g/dL         Medications Reviewed    PMH/ reviewed - no change compared to H&P  Attending Physician: Anuja Chapa MD   Date/Time:  2/5/2017    ________________________________________________________________________

## 2017-02-06 PROBLEM — K57.32 DIVERTICULITIS OF SIGMOID COLON: Status: RESOLVED | Noted: 2017-02-05 | Resolved: 2017-02-06

## 2017-02-06 PROBLEM — K92.2 GI BLEED: Status: RESOLVED | Noted: 2017-02-04 | Resolved: 2017-02-06

## 2017-02-06 LAB
ANION GAP BLD CALC-SCNC: 9 MMOL/L (ref 5–15)
BUN SERPL-MCNC: 11 MG/DL (ref 6–20)
BUN/CREAT SERPL: 10 (ref 12–20)
CALCIUM SERPL-MCNC: 7.4 MG/DL (ref 8.5–10.1)
CHLORIDE SERPL-SCNC: 108 MMOL/L (ref 97–108)
CO2 SERPL-SCNC: 25 MMOL/L (ref 21–32)
CREAT SERPL-MCNC: 1.07 MG/DL (ref 0.7–1.3)
GLUCOSE SERPL-MCNC: 98 MG/DL (ref 65–100)
HGB BLD-MCNC: 8.8 G/DL (ref 12.1–17)
HGB BLD-MCNC: 8.9 G/DL (ref 12.1–17)
POTASSIUM SERPL-SCNC: 3.8 MMOL/L (ref 3.5–5.1)
SODIUM SERPL-SCNC: 142 MMOL/L (ref 136–145)

## 2017-02-06 PROCEDURE — 74011000250 HC RX REV CODE- 250: Performed by: INTERNAL MEDICINE

## 2017-02-06 PROCEDURE — 74011000258 HC RX REV CODE- 258: Performed by: INTERNAL MEDICINE

## 2017-02-06 PROCEDURE — 74011250637 HC RX REV CODE- 250/637: Performed by: NURSE PRACTITIONER

## 2017-02-06 PROCEDURE — 74011250636 HC RX REV CODE- 250/636: Performed by: INTERNAL MEDICINE

## 2017-02-06 PROCEDURE — 36415 COLL VENOUS BLD VENIPUNCTURE: CPT | Performed by: INTERNAL MEDICINE

## 2017-02-06 PROCEDURE — 74011000258 HC RX REV CODE- 258: Performed by: NURSE PRACTITIONER

## 2017-02-06 PROCEDURE — 85018 HEMOGLOBIN: CPT | Performed by: INTERNAL MEDICINE

## 2017-02-06 PROCEDURE — 80048 BASIC METABOLIC PNL TOTAL CA: CPT | Performed by: INTERNAL MEDICINE

## 2017-02-06 PROCEDURE — 65660000000 HC RM CCU STEPDOWN

## 2017-02-06 PROCEDURE — C9113 INJ PANTOPRAZOLE SODIUM, VIA: HCPCS | Performed by: INTERNAL MEDICINE

## 2017-02-06 RX ORDER — LEVOFLOXACIN 500 MG/1
500 TABLET, FILM COATED ORAL EVERY 24 HOURS
Qty: 7 TAB | Refills: 0 | Status: SHIPPED | OUTPATIENT
Start: 2017-02-06 | End: 2017-02-13

## 2017-02-06 RX ORDER — AMLODIPINE BESYLATE 5 MG/1
5 TABLET ORAL DAILY
Qty: 30 TAB | Refills: 1 | Status: SHIPPED | OUTPATIENT
Start: 2017-02-06 | End: 2017-02-21

## 2017-02-06 RX ORDER — METRONIDAZOLE 500 MG/1
500 TABLET ORAL 3 TIMES DAILY
Qty: 24 TAB | Refills: 0 | Status: SHIPPED | OUTPATIENT
Start: 2017-02-06 | End: 2017-02-14

## 2017-02-06 RX ORDER — PANTOPRAZOLE SODIUM 40 MG/1
40 TABLET, DELAYED RELEASE ORAL
Qty: 30 TAB | Refills: 1 | Status: SHIPPED | OUTPATIENT
Start: 2017-02-06

## 2017-02-06 RX ORDER — LEVOFLOXACIN 500 MG/1
500 TABLET, FILM COATED ORAL EVERY 24 HOURS
Status: DISCONTINUED | OUTPATIENT
Start: 2017-02-06 | End: 2017-02-07 | Stop reason: HOSPADM

## 2017-02-06 RX ORDER — PANTOPRAZOLE SODIUM 40 MG/1
40 TABLET, DELAYED RELEASE ORAL
Status: DISCONTINUED | OUTPATIENT
Start: 2017-02-07 | End: 2017-02-07 | Stop reason: HOSPADM

## 2017-02-06 RX ORDER — METRONIDAZOLE 250 MG/1
500 TABLET ORAL 3 TIMES DAILY
Status: DISCONTINUED | OUTPATIENT
Start: 2017-02-06 | End: 2017-02-07 | Stop reason: HOSPADM

## 2017-02-06 RX ADMIN — METRONIDAZOLE 500 MG: 500 INJECTION, SOLUTION INTRAVENOUS at 06:08

## 2017-02-06 RX ADMIN — Medication 10 ML: at 14:51

## 2017-02-06 RX ADMIN — METRONIDAZOLE 500 MG: 250 TABLET ORAL at 22:53

## 2017-02-06 RX ADMIN — SODIUM CHLORIDE 100 ML/HR: 450 INJECTION, SOLUTION INTRAVENOUS at 14:51

## 2017-02-06 RX ADMIN — SODIUM CHLORIDE 8 MG/HR: 900 INJECTION, SOLUTION INTRAVENOUS at 04:19

## 2017-02-06 RX ADMIN — SODIUM CHLORIDE 8 MG/HR: 900 INJECTION, SOLUTION INTRAVENOUS at 09:36

## 2017-02-06 RX ADMIN — Medication 10 ML: at 22:53

## 2017-02-06 RX ADMIN — Medication 10 ML: at 06:08

## 2017-02-06 RX ADMIN — SODIUM CHLORIDE 125 ML/HR: 450 INJECTION, SOLUTION INTRAVENOUS at 04:19

## 2017-02-06 RX ADMIN — LEVOFLOXACIN 500 MG: 500 TABLET, FILM COATED ORAL at 16:05

## 2017-02-06 RX ADMIN — METRONIDAZOLE 500 MG: 250 TABLET ORAL at 16:05

## 2017-02-06 NOTE — PROGRESS NOTES
Bedside and Verbal shift change report given to [address] [place] [address] (oncoming nurse) by Jeremiah Galeazzi (offgoing nurse).  Report included the following information SBAR, Kardex, MAR, Recent Results and Cardiac Rhythm SR.

## 2017-02-06 NOTE — CDMP QUERY
The diagnosis of \"GIO\" has been documented for your patient. Currently, the documentation does not meet  [place] Criteria used by [facility] as a reference for this dx. Please document the the criteria if using another reference (ex. YASMIN, KDIGO). ...or ruled out, or can the diagnosis be clarified as:    =>Acute renal insufficiency in the setting of increase in Cr suspect of pre-renal w/fluid loss 2/2 GI bleeding requiring hydration. =>Other Explanation of clinical findings  =>Unable to Determine (no explanation of clinical findings)    Current Documentation: Pt. transferred from [facility] w/GI bleed. Cr on adm. noted to be 1.31 w/GFR 56 on 2/4. Down to 1.07 w/GFR back up to >60 on 2/6. Hx of Cr noted to be around 1.12-1.25 w/GFR 59->60.             Thank you,    Zelda Morin RN, BSN, [facility] [address], [address] [place]  [phone]

## 2017-02-06 NOTE — PROGRESS NOTES
Transition RN to the Bedside to assist Primary RN with patient education, medication educations, discharge instructions, and stroke core measure compliance. Discharge concerns initiated and discussed with patient, including clarification on \"who\" assists the patient at their home and instructions for when the home going patient should call their provider after discharge. Opportunity for questions and clarification was provided. Patient receptive to education: YES  Patient stated: \"I am not sure when I am going home. \"  Barriers to Education: none  Diagnosis Education given:  YES    Length of stay: 2  Expected Day of Discharge: 2    Education Day #: 1    Medication Education Given:  YES  M in the box Medication name: flagyl, levaquin, protonix,       Stroke Education documented in Patient Education: NO  Core Measures Documented in [facility]:  Risk Factors: NO  Warning signs of stroke: NO  When to Activate 911: NO  Medication Education for Risk Factors: NO  Smoking cessation if applicable: NO  Written Education Given:  NO    Discharge NIH Completed: NO  Score: na    BRAINS: NO    Follow Up Appointment Made: NO  Date/Time if applicable: TBD

## 2017-02-06 NOTE — PROGRESS NOTES
Patient is a 62 y/o single male insured by [facility], admitted to [facility] 2/4/17 for GI bleed. CM visited patient bedside. Patient A&Ox4, cooperative, engaged. Confirmed demographics on facesheet. Main mailing address is [address], confirmed physical address [address], [address]. Patient lives alone, independent with all ADL/IADL's, works full-time for [employer]. Patient last saw PCP Dr. Mercado approx. 3 months ago, follows regularly with Dr. Shantelle Arevalo for GI issues. Patient hoping to discharge home today or tomorrow, no further CM interventions indicated. Patient denied further questions/concerns for CM. He will ask friend or relative to provide d/c transport home. Discharge home with no further CM needs. CM available for consult should new needs arise. HENRIQUE Parikh/LIV    Care Management Interventions  PCP Verified by CM:  Yes (estimates last visit 3 months ago)  Mode of Transport at Discharge:  (friend/family)  [place] Signup: No  Discharge Durable Medical Equipment: No  Physical Therapy Consult: No  Occupational Therapy Consult: No  Speech Therapy Consult: No  Current Support Network: Lives Alone  Confirm Follow Up Transport: Self  Plan discussed with Pt/Family/Caregiver: Yes  Discharge Location  Discharge Placement: Home

## 2017-02-06 NOTE — PROGRESS NOTES
Hospitalist Progress Note  Joslyn Lugo NP  Office: [phone]  Cell: [phone]      Date of Service:  2017  NAME:  Liu Thorne  :  1955  MRN:  [id number]    Admission Summary:   Pt presented to [facility] as a transfer from [address] [place] [address] on 17. Pt reported 4 episodes of hematochezia and LLQ pain. Abd CT scan revealed acute sigmoid diverticulitis. Interval history / Subjective:      Pt in bed, reports a small loose non bloody BM yesterday. Tolerating clear liquids without pain and or N/V. Assessment & Plan:     Acute GI bleed (POA) likely 2/2 acute sigmoid diverticulitis:  - CT abd => Acute sigmoid diverticulitis. No fluid collection to suggest abscess. No  evidence of active GI bleeding. Small periumbilical hernia. - gi following, normally follows with Dr. Antoinette Tuttle outpatient  - clear liquids, tolerating well  - still on protonix drip. - levaquin (today is day 3), flagyl (today is second complete day of therapy). GI has recommended antibiotics x 10 days  - Hgb stabilized and appears to not be bleeding. Hgb 8.9-9.1 x 24 hours. Monitor q 12 hours. Acute Kidney Insufficiency (POA):  - suspect prerenal with fluid loss  - cr 1.31 on admission, improved to 1.07 today with hydration. HTN: hold home medication for now, blood pressure reasonable. Code status: Full  DVT prophylaxis: SCDs  Care Plan discussed with: patient, Dr. Yue Cope  Disposition: Home when able, next 1-2 days     Hospital Problems  Date Reviewed: 2017          Codes Class Noted POA    Diverticulitis of sigmoid colon ICD-10-CM: K57.32  ICD-9-CM: 562.11  2017 Unknown        * (Principal)GI bleed ICD-10-CM: K92.2  ICD-9-CM: 578.9  2017 Unknown            Review of Systems:   Denies HA. No chest pain or pressure. No respiratory or GI complaints (other than being hungry). Vital Signs:    Last 24hrs VS reviewed since prior progress note.  Most recent are:  Visit Vitals    /68 (BP 1 Location: Left arm, BP Patient Position: At rest)    Pulse 75    Temp 98.2 °F (36.8 °C)    Resp 16    Ht 5' 7\" (1.702 m)    Wt 99 kg (218 lb 4.1 oz)    SpO2 98%    BMI 34.18 kg/m2         Intake/Output Summary (Last 24 hours) at 02/06/17 1017  Last data filed at 02/06/17 0834   Gross per 24 hour   Intake             3523 ml   Output              400 ml   Net             3123 ml        Physical Examination:        Constitutional:  No acute distress, cooperative, pleasant    ENT:  Oral mucous moist, oropharynx benign. Resp:  CTA bilaterally. No wheezing/rhonchi/rales. No accessory muscle use   CV:  Regular rhythm, normal rate, no murmurs, gallops, rubs. SR on tele    GI:  Soft, non distended, non-tender this am. Normoactive bowel sounds. Loose BM 2/5     Musculoskeletal:  No edema, warm, 2+ pulses throughout. 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 AAOx3. No focal deficits. Psych:  Good insight, Not anxious nor agitated. Skin:  Good turgor, no rashes or ulcers    Data Review:    Review and/or order of clinical lab test  Review and/or order of tests in the radiology section of CPT  Review and/or order of tests in the medicine section of CPT      Labs:     Recent Labs      02/06/17   0606  02/05/17   2341   02/04/17   1519  02/04/17   1238   WBC   --    --    --   11.0  10.3   HGB  8.9*  9.3*   < >  11.7*  11.4*   HCT   --    --    --   37.6  37.4   PLT   --    --    --   172  167    < > = values in this interval not displayed.      Recent Labs      02/06/17   0606  02/04/17   0930   NA  142  140   K  3.8  4.6   CL  108  105   CO2  25  28   BUN  11  15   CREA  1.07  1.31*   GLU  98  137*   CA  7.4*  7.7*     Recent Labs      02/04/17   0930   SGOT  17   ALT  21   AP  74   TBILI  0.4   TP  6.6   ALB  3.1*   GLOB  3.5     Recent Labs      02/04/17   0930   INR  1.1   PTP  10.9      No results for input(s): FE, TIBC, PSAT, FERR in the last 72 hours. No results found for: FOL, RBCF   No results for input(s): PH, PCO2, PO2 in the last 72 hours. No results for input(s): CPK, CKNDX, TROIQ in the last 72 hours.     No lab exists for component: CPKMB  No results found for: CHOL, CHOLX, CHLST, CHOLV, HDL, LDL, DLDL, LDLC, DLDLP, TGL, TGLX, TRIGL, TRIGP, CHHD, CHHDX  Lab Results   Component Value Date/Time    Glucose (POC) 104 11/30/2010 06:10 PM     Lab Results   Component Value Date/Time    Color DARK YELLOW 02/04/2017 10:30 AM    Appearance CLEAR 02/04/2017 10:30 AM    Specific gravity 1.025 02/04/2017 10:30 AM    pH (UA) 5.5 02/04/2017 10:30 AM    Protein NEGATIVE  02/04/2017 10:30 AM    Glucose NEGATIVE  02/04/2017 10:30 AM    Ketone TRACE 02/04/2017 10:30 AM    Bilirubin NEGATIVE  02/04/2017 10:30 AM    Urobilinogen 1.0 02/04/2017 10:30 AM    Nitrites NEGATIVE  02/04/2017 10:30 AM    Leukocyte Esterase TRACE 02/04/2017 10:30 AM    Epithelial cells FEW 02/04/2017 10:30 AM    Bacteria NEGATIVE  02/04/2017 10:30 AM    WBC 0-4 02/04/2017 10:30 AM    RBC 0-5 02/04/2017 10:30 AM         Medications Reviewed:     Current Facility-Administered Medications   Medication Dose Route Frequency    0.45% sodium chloride infusion  125 mL/hr IntraVENous CONTINUOUS    pantoprazole (PROTONIX) 40 mg in 0.9% sodium chloride (MBP/ADV) 50 mL  8 mg/hr IntraVENous CONTINUOUS    levoFLOXacin (LEVAQUIN) 500 mg in D5W IVPB  500 mg IntraVENous Q24H    metroNIDAZOLE (FLAGYL) IVPB premix 500 mg  500 mg IntraVENous Q8H    sodium chloride (NS) flush 5-10 mL  5-10 mL IntraVENous Q8H    sodium chloride (NS) flush 5-10 mL  5-10 mL IntraVENous PRN    ondansetron (ZOFRAN) injection 4 mg  4 mg IntraVENous Q4H PRN   ______________________________________________________________________  EXPECTED LENGTH OF STAY: - - -  ACTUAL LENGTH OF STAY:          2               Chan Buchanan NP

## 2017-02-06 NOTE — INTERDISCIPLINARY ROUNDS
IDR/SLIDR Summary          Patient: Charli Carr MRN: [id number]    Age: 63 y.o. YOB: 1955 Room/Bed: [facility]   Admit Diagnosis: GI bleed  Principal Diagnosis: GI bleed   Goals: Hgb stabilizing  Readmission: NO  Quality Measure: Not applicable  VTE Prophylaxis: Mechanical  Influenza Vaccine screening completed? YES  Pneumococcal Vaccine screening completed? YES  Mobility needs: No   Nutrition plan:Yes  Consults: P. T and O.T. Financial concerns NO  Escalated to CM? NO  RRAT Score: 9   Interventions   Testing due for pt today?  NO  LOS: 2 days Expected length of stay 2 days  Discharge plan: HOME   PCP: Мария Atkins MD  Transportation needs: Yes    Days before discharge:one day until discharge   Discharge disposition: Home    Signed:     Franklin Carrera RN  02/06/17  7:56 AM

## 2017-02-06 NOTE — PROGRESS NOTES
[address]  [address], [address]       GI PROGRESS NOTE  Nanci Hawthorne [facility]  [phone]    NAME: Xochitl Matamoros   :  1955   MRN:  [id number]       Subjective:     Minimal pain, no bleeding, Tolerating clears    Objective:     VITALS:   Last 24hrs VS reviewed since prior progress note. Most recent are:  Visit Vitals    /75 (BP 1 Location: Left arm, BP Patient Position: At rest)    Pulse 66    Temp 98.4 °F (36.9 °C)    Resp 18    Ht 5' 7\" (1.702 m)    Wt 99 kg (218 lb 4.1 oz)    SpO2 99%    BMI 34.18 kg/m2       PHYSICAL EXAM:  General: Cooperative, no acute distress    Neurologic:  Alert and oriented X 3. HEENT: PERRLA, EOMI. Lungs:  CTA Bilaterally. No Wheezing  Heart:  s1 s2, Regular  rhythm,  No murmur   Abdomen: Soft, obese, Non distended, Non tender.  +Bowel sounds  Extremities: No edema  Psych:   Good insight. Not anxious nor agitated.     Lab Data Reviewed:     Recent Results (from the past 24 hour(s))   HEMOGLOBIN    Collection Time: 17  5:48 PM   Result Value Ref Range    HGB 9.3 (L) 12.1 - 17.0 g/dL   HEMOGLOBIN    Collection Time: 17 11:41 PM   Result Value Ref Range    HGB 9.3 (L) 12.1 - 17.0 g/dL   HEMOGLOBIN    Collection Time: 17  6:06 AM   Result Value Ref Range    HGB 8.9 (L) 12.1 - 82.7 g/dL   METABOLIC PANEL, BASIC    Collection Time: 17  6:06 AM   Result Value Ref Range    Sodium 142 136 - 145 mmol/L    Potassium 3.8 3.5 - 5.1 mmol/L    Chloride 108 97 - 108 mmol/L    CO2 25 21 - 32 mmol/L    Anion gap 9 5 - 15 mmol/L    Glucose 98 65 - 100 mg/dL    BUN 11 6 - 20 MG/DL    Creatinine 1.07 0.70 - 1.30 MG/DL    BUN/Creatinine ratio 10 (L) 12 - 20      GFR est AA >60 >60 ml/min/1.73m2    GFR est non-AA >60 >60 ml/min/1.73m2    Calcium 7.4 (L) 8.5 - 10.1 MG/DL         ________________________________________________________________________       Assessment:   · Acute Diverticulitis with rectal bleeding- improving, hgb low but stable around 9. Bleeding appears to have stopped. Patient Active Problem List   Diagnosis Code    GI bleed K92.2    Hypertension I10    Diverticulitis of sigmoid colon K57.32     Plan:   · Advance diet  · Change to PO abx  · If tolerates without signs of further bleeding then ok to DC home  · Follow up in the office in 4 weeks     Signed By: Thomas Lucia NP     2/6/2017  11:29 AM         I have examined the patient. I have reviewed the chart and agree with the documentation recorded by the NP, including the assessment, treatment plan, and disposition.       Reji Louis MD

## 2017-02-07 VITALS
HEART RATE: 89 BPM | OXYGEN SATURATION: 96 % | DIASTOLIC BLOOD PRESSURE: 72 MMHG | BODY MASS INDEX: 34.36 KG/M2 | HEIGHT: 67 IN | WEIGHT: 218.92 LBS | RESPIRATION RATE: 15 BRPM | TEMPERATURE: 98.4 F | SYSTOLIC BLOOD PRESSURE: 152 MMHG

## 2017-02-07 PROCEDURE — 74011000258 HC RX REV CODE- 258: Performed by: NURSE PRACTITIONER

## 2017-02-07 PROCEDURE — 74011250637 HC RX REV CODE- 250/637: Performed by: NURSE PRACTITIONER

## 2017-02-07 RX ADMIN — PANTOPRAZOLE SODIUM 40 MG: 40 TABLET, DELAYED RELEASE ORAL at 06:51

## 2017-02-07 RX ADMIN — SODIUM CHLORIDE 100 ML/HR: 450 INJECTION, SOLUTION INTRAVENOUS at 02:41

## 2017-02-07 RX ADMIN — Medication 10 ML: at 06:51

## 2017-02-07 RX ADMIN — METRONIDAZOLE 500 MG: 250 TABLET ORAL at 08:52

## 2017-02-07 NOTE — DISCHARGE INSTRUCTIONS
Discharge Instructions     PATIENT ID: Delroy Sotelo  MRN: [id number]   YOB: 1955    DATE OF ADMISSION: 2/4/2017  3:10 PM    DATE OF DISCHARGE: 2/6/2017  PRIMARY CARE PROVIDER: Flossie Goldberg, MD   ATTENDING PHYSICIAN: Jaylen Lobato MD  DISCHARGING PROVIDER: Vamshi Dover NP. To contact this individual call [phone] and ask the  to page. If unavailable ask to be transferred the Adult Hospitalist Department. DISCHARGE DIAGNOSES  Diverticulitis with Bleeding    CONSULTATIONS: IP CONSULT TO GASTROENTEROLOGY  IP CONSULT TO HOSPITALIST    FOLLOW UP APPOINTMENTS:   Follow-up Information     Follow up With Details Comments Contact Zoran Quarles MD  As needed Nikolay Willoughby  [address]    [address] [address]  [phone]      Lenard Ching MD In 4 weeks  [facility]  [phone]           ADDITIONAL CARE RECOMMENDATIONS:   Complete full course of antibiotics: both levaquin and flagyl    Call Dr. Anna Chisholm office if:  - you have bloody or black tarry stools   - you develop fevers or intensifying abdominal pain    DIET: GI light, low fiber diet for next 5-7 days. Add fiber back to diet and strive for higher fiber foods once pain is resolved. - Avoid foods with nuts or seeds. NO Popcorn unless kernel/gauthier free. ACTIVITY: Activity as tolerated    · It is important that you take the medication exactly as they are prescribed. · Keep your medication in the bottles provided by the pharmacist and keep a list of the medication names, dosages, and times to be taken in your wallet. · Do not take other medications without consulting your doctor. NOTIFY YOUR PHYSICIAN FOR ANY OF THE FOLLOWING:   Fever over 101 degrees for 24 hours. Chest pain, shortness of breath, fever, chills, nausea, vomiting, diarrhea, change in mentation, falling, weakness, bleeding. Severe pain or pain not relieved by medications.   Or, any other signs or symptoms that you may have questions about.     DISPOSITION:    Home With:   OT  PT  HH  RN       SNF/Inpatient Rehab/LTAC    Independent/assisted living    Hospice   xx Other: Home     CDMP Checked:   Yes *x*     PROBLEM LIST Updated:  Yes *x*     Signed:   Wing Vincent NP  2/6/2017  8:32 PM

## 2017-02-07 NOTE — DISCHARGE SUMMARY
Discharge Summary     PATIENT ID: Annalee Martinez  MRN: [id number]   YOB: 1955    DATE OF ADMISSION: 2/4/2017  3:10 PM    DATE OF DISCHARGE: 2/7/2017   PRIMARY CARE PROVIDER: Sheila Naranjo MD   ATTENDING PHYSICIAN: Marleny Hart MD  DISCHARGING PROVIDER: Keely Sadler NP. To contact this individual call [phone] and ask the  to page. If unavailable ask to be transferred the Adult Hospitalist Department. CONSULTATIONS: IP CONSULT TO GASTROENTEROLOGY  IP CONSULT TO HOSPITALIST    ADMITTING [address] [place] [address] COURSE:   Pt presented to [facility] as a transfer from [facility] on 2/4/17. Pt reported 4 episodes of hematochezia and LLQ pain. Abd CT scan revealed acute sigmoid diverticulitis.      DISCHARGE DIAGNOSES / PLAN:      Acute GI bleed (POA) likely 2/2 acute sigmoid diverticulitis:  - CT abd => Acute sigmoid diverticulitis. No fluid collection to suggest abscess. No  evidence of active GI bleeding. Small periumbilical hernia. - gi following, normally follows with Dr. Debbie Ortiz outpatient: see in 4 weeks. - Hgb stabilized and appears to not be bleeding.   - discharge to complete total of 10 days of levaquin and flagyl.   - PPI daily.     Acute renal insufficiency (POA):  - suspect prerenal with fluid loss  - cr 1.31 on admission, improved to 1.07 with hydration.      HTN: resume home meds. FOLLOW UP APPOINTMENTS:    Follow-up Information     Follow up With Details Comments Contact Manju Diallo MD  As needed Nikolay Willoughby . [address]    [address] [address]  [phone]      Arabella Kat MD In 4 weeks  [facility]  [phone]           ADDITIONAL CARE RECOMMENDATIONS:   Complete full course of antibiotics: both levaquin and flagyl    Call Dr. Barb Meng office if:  - you have bloody or black tarry stools   - you develop fevers or intensifying abdominal pain    DIET: GI light, low fiber diet for next 5-7 days.  Add fiber back to diet and strive for higher fiber foods once pain is resolved. - Avoid foods with nuts or seeds. NO Popcorn unless kernel/gauthier free. ACTIVITY: Activity as tolerated    DISCHARGE MEDICATIONS:  Current Discharge Medication List      START taking these medications    Details   levoFLOXacin (LEVAQUIN) 500 mg tablet Take 1 Tab by mouth every twenty-four (24) hours for 7 days. Qty: 7 Tab, Refills: 0      metroNIDAZOLE (FLAGYL) 500 mg tablet Take 1 Tab by mouth three (3) times daily for 8 days. Qty: 24 Tab, Refills: 0         CONTINUE these medications which have CHANGED    Details   pantoprazole (PROTONIX) 40 mg tablet Take 1 Tab by mouth Daily (before breakfast). Qty: 30 Tab, Refills: 1      amLODIPine (NORVASC) 5 mg tablet Take 1 Tab by mouth daily. Indications: hypertension  Qty: 30 Tab, Refills: 1         CONTINUE these medications which have NOT CHANGED    Details   therapeutic multivitamin (THERAGRAN) tablet Take 1 Tab by mouth daily. NOTIFY YOUR PHYSICIAN FOR ANY OF THE FOLLOWING:   Fever over 101 degrees for 24 hours. Chest pain, shortness of breath, fever, chills, nausea, vomiting, diarrhea, change in mentation, falling, weakness, bleeding. Severe pain or pain not relieved by medications. Or, any other signs or symptoms that you may have questions about.     DISPOSITION:    Home With:   OT  PT  HH  RN       Long term SNF/Inpatient Rehab    Independent/assisted living    Hospice   xx Other:     PATIENT CONDITION AT DISCHARGE:   Functional status    Poor     Deconditioned    xx Independent      Cognition   xx  Lucid     Forgetful     Dementia      Catheters/lines (plus indication)    Rodríguez     PICC     PEG    xx None      Code status   xx  Full code     DNR      PHYSICAL EXAMINATION AT DISCHARGE:  Visit Vitals    /72 (BP 1 Location: Right arm, BP Patient Position: At rest)    Pulse 89    Temp 98.4 °F (36.9 °C)    Resp 15    Ht 5' 7\" (1.702 m)    Wt 99.3 kg (218 lb 14.7 oz)    SpO2 96%    BMI 34.29 kg/m2     Pt in bed, ready for discharge. No abdominal pain or diarrhea. Discussed discharge, including need for follow up and new medications. Pt also asked for prescriptions for norvasc and protonix as he had run out at home. Constitutional: No acute distress, cooperative, pleasant    ENT: Oral mucous moist.   Resp: CTA bilaterally. No wheezing/rhonchi/rales. No accessory muscle use   CV: Regular rhythm. SR on tele   GI: Soft, non distended, non-tender. Normoactive bowel sounds. Loose BM 2/5    Musculoskeletal: No edema, warm, 2+ pulses throughout. 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 AAOx3. No focal deficits. Psych: Good insight, Not anxious nor agitated.                             Skin: Good turgor, no rashes or ulcers    CHRONIC MEDICAL DIAGNOSES:  Problem List as of 2/7/2017  Date Reviewed: 2/7/2017          Codes Class Noted - Resolved    Hypertension (Chronic) ICD-10-CM: I10  ICD-9-CM: 401.9  2/5/2017 - Present        RESOLVED: Diverticulitis of sigmoid colon ICD-10-CM: K57.32  ICD-9-CM: 562.11  2/5/2017 - 2/6/2017        * (Principal)RESOLVED: GI bleed ICD-10-CM: K92.2  ICD-9-CM: 578.9  2/4/2017 - 2/6/2017        RESOLVED: GI bleed ICD-10-CM: K92.2  ICD-9-CM: 578.9  7/16/2016 - 7/17/2016        RESOLVED: Lower GI bleed ICD-10-CM: K92.2  ICD-9-CM: 578.9  4/7/2016 - 7/18/2016            Greater than 30 minutes were spent with the patient on counseling and coordination of care    Signed:   Joslyn Lugo NP  2/7/2017  10:01 AM

## 2017-02-07 NOTE — PROGRESS NOTES
[address] [facility] [address], [address]    GI PROGRESS NOTE    NAME: Swapna Huizar   :  1955   MRN:  [id number]       Subjective:     Feels well  Review of Systems    Constitutional: negative fever, negative chills, negative weight loss  Eyes:   negative visual changes  ENT:   negative sore throat, tongue or lip swelling  Respiratory:  negative cough, negative dyspnea  Cards:  negative for chest pain, palpitations, lower extremity edema  GI:   See HPI  :  negative for frequency, dysuria  Integument:  negative for rash and pruritus  Heme:  negative for easy bruising and gum/nose bleeding  Musculoskel: negative for myalgias,  back pain and muscle weakness  Neuro: negative for headaches, dizziness, vertigo  Psych:  negative for feelings of anxiety, depression         Objective:     VITALS:   Last 24hrs VS reviewed since prior progress note. Most recent are:  Visit Vitals    /72 (BP 1 Location: Right arm, BP Patient Position: At rest)    Pulse 89    Temp 98.4 °F (36.9 °C)    Resp 15    Ht 5' 7\" (1.702 m)    Wt 99.3 kg (218 lb 14.7 oz)    SpO2 96%    BMI 34.29 kg/m2       Intake/Output Summary (Last 24 hours) at 17 0832  Last data filed at 17 0653   Gross per 24 hour   Intake              720 ml   Output             2780 ml   Net            -2060 ml     PHYSICAL EXAM:  General: WD, WN. Alert, cooperative, no acute distress    HEENT: NC, Atraumatic. PERRLA, EOMI. Anicteric sclerae. Lungs:  CTA Bilaterally. No Wheezing/Rhonchi/Rales. Heart:  Regular  rhythm,  No murmur (), No Rubs, No Gallops  Abdomen: Soft, Non distended, Non tender.  +Bowel sounds, no HSM  Extremities: No c/c/e  Neurologic:  CN 2-12 gi, Alert and oriented X 3. No acute neurological distress   Psych:   Good insight. Not anxious nor agitated.     Lab Data Reviewed:   Recent Labs      17   2251  17   0606   17   1519  17   1238   WBC   --    --    --   11.0 10. 3   HGB  8.8*  8.9*   < >  11.7*  11.4*   HCT   --    --    --   37.6  37.4   PLT   --    --    --   172  167    < > = values in this interval not displayed.      Recent Labs      02/06/17   0606  02/04/17 0930   NA  142  140   K  3.8  4.6   CL  108  105   CO2  25  28   BUN  11  15   CREA  1.07  1.31*   GLU  98  137*   CA  7.4*  7.7*     Recent Labs      02/04/17 0930   SGOT  17   AP  74   TP  6.6   ALB  3.1*   GLOB  3.5       ________________________________________________________________________       Assessment:   · Acute diverticulitis  · Rectal bleed, resolved     Patient Active Problem List   Diagnosis Code    Hypertension I10     Plan:   · May d/c today if agreeable with hospitalist on current antibiotics for 14 days  · Low fiber diet for 1 month then high fiber diet  · F/u with his GI, Dr. Tomás Chandra     Signed By: Elda Agudelo MD     2/7/2017  8:32 AM

## 2017-02-07 NOTE — PROGRESS NOTES
Bedside shift change report given to Suad Jason RN (oncoming nurse) by César Gaines RN (offgoing nurse). Report included the following information SBAR, Kardex, ED Summary, Procedure Summary, Intake/Output, MAR, Accordion, Recent Results and Med Rec Status.

## 2017-02-07 NOTE — PROGRESS NOTES
Bedside shift change report given to Melo Barcenas RN (oncoming nurse) by Jasmina Dean RN (offgoing nurse). Report included the following information SBAR, Kardex, Intake/Output, MAR, Recent Results and Cardiac Rhythm NSR.

## 2017-02-07 NOTE — PROGRESS NOTES
Transition RN to the Bedside to assist Primary RN with patient education, medication educations, discharge instructions, and stroke core measure compliance. Discharge concerns initiated and discussed with patient, including clarification on \"who\" assists the patient at their home and instructions for when the home going patient should call their provider after discharge. Opportunity for questions and clarification was provided. Patient receptive to education: YES  Patient stated:  \"My ride will be here around 10.\"  Barriers to Education: none  Diagnosis Education given:  YES    Length of stay: 3  Expected Day of Discharge: today    Education Day #: discharge (2)    Medication Education Given:  YES  M in the box Medication name: Flagyl, Levoquin, daily vitamin, Norvasc      Stroke Education documented in Patient Education: NO  Core Measures Documented in [facility]:  Risk Factors: NO  Warning signs of stroke: NO  When to Activate 911: NO  Medication Education for Risk Factors: NO  Smoking cessation if applicable: NO  Written Education Given:  NO    Discharge NIH Completed: NO  Score: na    BRAINS: NO    Follow Up Appointment Made: NO  Date/Time if applicable: 4 weeks

## 2017-02-07 NOTE — PROGRESS NOTES
[address] February 7, 2017     RE: Servando Dasilva    To Whom It May Concern,    This is to certify that Servando Dasilva was hospitalized from 2/4/17-2/7/2017 and may return to work on 2/9/2017. Please feel free to contact my office if you have any questions or concerns. Thank you for your assistance in this matter.     Sincerely,  Anabel Veliz NP  [phone]

## 2017-02-07 NOTE — INTERDISCIPLINARY ROUNDS
IDR/SLIDR Summary           Patient: Lizbeth Carpio MRN: [id number]    Age: 63 y.o. YOB: 1955 Room/Bed: [facility]   Admit Diagnosis: GI bleed  Principal Diagnosis: GI bleed   Goals: Discharge  Readmission: NO  Quality Measure: Not applicable  VTE Prophylaxis: Mechanical  Influenza Vaccine screening completed? YES  Pneumococcal Vaccine screening completed? NO  Mobility needs: No   Nutrition plan:Yes   Financial concerns:No  Escalated to CM? NO  RRAT Score: 9   Testing due for pt today?  NO  LOS: 3 days Expected length of stay 2 or greater days  Discharge plan: home  PCP: Pradeep Morris MD  Transportation needs: No    Days before discharge:ready for discharge  Discharge disposition: Home    Signed:     Bjorn Hall RN  2/7/2017  7:57 AM

## 2017-02-07 NOTE — PROGRESS NOTES
Problem: Upper and Lower GI Bleed: Discharge Outcomes  Goal: *Anxiety reduced or absent  Outcome: Progressing Towards Goal  No evidence of anxiety  Goal: *Understands and describes signs and symptoms to report to providers(Stroke Metric)  Outcome: Progressing Towards Goal  Describes BEFAST symptoms/signs accurately.

## 2017-02-21 ENCOUNTER — HOSPITAL ENCOUNTER (EMERGENCY)
Age: 62
Discharge: HOME | End: 2017-02-21
Attending: EMERGENCY MEDICINE
Payer: COMMERCIAL

## 2017-02-21 ENCOUNTER — APPOINTMENT (OUTPATIENT)
Dept: GENERAL RADIOLOGY | Age: 62
End: 2017-02-21
Attending: EMERGENCY MEDICINE
Payer: COMMERCIAL

## 2017-02-21 ENCOUNTER — APPOINTMENT (OUTPATIENT)
Dept: CT IMAGING | Age: 62
End: 2017-02-21
Attending: EMERGENCY MEDICINE
Payer: COMMERCIAL

## 2017-02-21 VITALS
SYSTOLIC BLOOD PRESSURE: 119 MMHG | HEIGHT: 69 IN | OXYGEN SATURATION: 94 % | RESPIRATION RATE: 23 BRPM | BODY MASS INDEX: 31.03 KG/M2 | HEART RATE: 69 BPM | WEIGHT: 209.5 LBS | DIASTOLIC BLOOD PRESSURE: 75 MMHG | TEMPERATURE: 98.2 F

## 2017-02-21 DIAGNOSIS — R07.89 ATYPICAL CHEST PAIN: Primary | ICD-10-CM

## 2017-02-21 LAB
ALBUMIN SERPL BCP-MCNC: 3.2 G/DL (ref 3.5–5)
ALBUMIN/GLOB SERPL: 0.8 {RATIO} (ref 1.1–2.2)
ALP SERPL-CCNC: 69 U/L (ref 45–117)
ALT SERPL-CCNC: 18 U/L (ref 12–78)
ANION GAP BLD CALC-SCNC: 8 MMOL/L (ref 5–15)
APTT PPP: 26.3 SEC (ref 22.1–32.5)
AST SERPL W P-5'-P-CCNC: 11 U/L (ref 15–37)
ATRIAL RATE: 69 BPM
BASOPHILS # BLD AUTO: 0 K/UL (ref 0–0.1)
BASOPHILS # BLD: 1 % (ref 0–1)
BILIRUB SERPL-MCNC: 0.4 MG/DL (ref 0.2–1)
BUN SERPL-MCNC: 12 MG/DL (ref 6–20)
BUN/CREAT SERPL: 10 (ref 12–20)
CALCIUM SERPL-MCNC: 8.4 MG/DL (ref 8.5–10.1)
CALCULATED P AXIS, ECG09: 17 DEGREES
CALCULATED R AXIS, ECG10: -6 DEGREES
CALCULATED T AXIS, ECG11: 39 DEGREES
CHLORIDE SERPL-SCNC: 105 MMOL/L (ref 97–108)
CK SERPL-CCNC: 74 U/L (ref 39–308)
CO2 SERPL-SCNC: 27 MMOL/L (ref 21–32)
CREAT SERPL-MCNC: 1.2 MG/DL (ref 0.7–1.3)
D DIMER PPP FEU-MCNC: 1.05 MG/L FEU (ref 0–0.65)
DIAGNOSIS, 93000: NORMAL
EOSINOPHIL # BLD: 0.1 K/UL (ref 0–0.4)
EOSINOPHIL NFR BLD: 2 % (ref 0–7)
ERYTHROCYTE [DISTWIDTH] IN BLOOD BY AUTOMATED COUNT: 15.1 % (ref 11.5–14.5)
GLOBULIN SER CALC-MCNC: 3.9 G/DL (ref 2–4)
GLUCOSE SERPL-MCNC: 113 MG/DL (ref 65–100)
HCT VFR BLD AUTO: 35.2 % (ref 36.6–50.3)
HGB BLD-MCNC: 10.9 G/DL (ref 12.1–17)
INR PPP: 1.1 (ref 0.9–1.1)
LYMPHOCYTES # BLD AUTO: 22 % (ref 12–49)
LYMPHOCYTES # BLD: 1.2 K/UL (ref 0.8–3.5)
MCH RBC QN AUTO: 25.5 PG (ref 26–34)
MCHC RBC AUTO-ENTMCNC: 31 G/DL (ref 30–36.5)
MCV RBC AUTO: 82.4 FL (ref 80–99)
MONOCYTES # BLD: 0.3 K/UL (ref 0–1)
MONOCYTES NFR BLD AUTO: 5 % (ref 5–13)
NEUTS SEG # BLD: 4 K/UL (ref 1.8–8)
NEUTS SEG NFR BLD AUTO: 70 % (ref 32–75)
P-R INTERVAL, ECG05: 134 MS
PLATELET # BLD AUTO: 281 K/UL (ref 150–400)
POTASSIUM SERPL-SCNC: 3.6 MMOL/L (ref 3.5–5.1)
PROT SERPL-MCNC: 7.1 G/DL (ref 6.4–8.2)
PROTHROMBIN TIME: 11.4 SEC (ref 9–11.1)
Q-T INTERVAL, ECG07: 380 MS
QRS DURATION, ECG06: 88 MS
QTC CALCULATION (BEZET), ECG08: 407 MS
RBC # BLD AUTO: 4.27 M/UL (ref 4.1–5.7)
SODIUM SERPL-SCNC: 140 MMOL/L (ref 136–145)
THERAPEUTIC RANGE,PTTT: NORMAL SECS (ref 58–77)
TROPONIN I BLD-MCNC: <0.04 NG/ML (ref 0–0.08)
TROPONIN I SERPL-MCNC: <0.04 NG/ML
VENTRICULAR RATE, ECG03: 69 BPM
WBC # BLD AUTO: 5.6 K/UL (ref 4.1–11.1)

## 2017-02-21 PROCEDURE — 80053 COMPREHEN METABOLIC PANEL: CPT | Performed by: EMERGENCY MEDICINE

## 2017-02-21 PROCEDURE — 82550 ASSAY OF CK (CPK): CPT | Performed by: EMERGENCY MEDICINE

## 2017-02-21 PROCEDURE — 85730 THROMBOPLASTIN TIME PARTIAL: CPT | Performed by: EMERGENCY MEDICINE

## 2017-02-21 PROCEDURE — 74011000258 HC RX REV CODE- 258: Performed by: EMERGENCY MEDICINE

## 2017-02-21 PROCEDURE — 93005 ELECTROCARDIOGRAM TRACING: CPT

## 2017-02-21 PROCEDURE — 71275 CT ANGIOGRAPHY CHEST: CPT

## 2017-02-21 PROCEDURE — 99285 EMERGENCY DEPT VISIT HI MDM: CPT

## 2017-02-21 PROCEDURE — 85025 COMPLETE CBC W/AUTO DIFF WBC: CPT | Performed by: EMERGENCY MEDICINE

## 2017-02-21 PROCEDURE — 84484 ASSAY OF TROPONIN QUANT: CPT | Performed by: EMERGENCY MEDICINE

## 2017-02-21 PROCEDURE — 36415 COLL VENOUS BLD VENIPUNCTURE: CPT | Performed by: EMERGENCY MEDICINE

## 2017-02-21 PROCEDURE — 74011636320 HC RX REV CODE- 636/320: Performed by: EMERGENCY MEDICINE

## 2017-02-21 PROCEDURE — 71010 XR CHEST PORT: CPT

## 2017-02-21 PROCEDURE — 85610 PROTHROMBIN TIME: CPT | Performed by: EMERGENCY MEDICINE

## 2017-02-21 PROCEDURE — 85379 FIBRIN DEGRADATION QUANT: CPT | Performed by: EMERGENCY MEDICINE

## 2017-02-21 RX ORDER — SODIUM CHLORIDE 0.9 % (FLUSH) 0.9 %
10 SYRINGE (ML) INJECTION
Status: COMPLETED | OUTPATIENT
Start: 2017-02-21 | End: 2017-02-21

## 2017-02-21 RX ADMIN — IOPAMIDOL 85 ML: 755 INJECTION, SOLUTION INTRAVENOUS at 12:21

## 2017-02-21 RX ADMIN — Medication 10 ML: at 12:21

## 2017-02-21 RX ADMIN — SODIUM CHLORIDE 100 ML: 900 INJECTION, SOLUTION INTRAVENOUS at 12:21

## 2017-02-21 NOTE — ED PROVIDER NOTES
HPI Comments: 63 y.o. male with past medical history significant for acid reflux, hypertension, GERD, sleep apnea, arthritis and DM who presents from home with chief complaint of dizziness. Patient reports that upon waking up this morning he felt dizzy as if he were \"off balance. \" He states that since waking up he has experienced multiple 2-3 minute episodes of non-exertion, non-pleuritic midsternal chest pain with shortness of breath that he describes as a \"sharp pain\" which then \"eases up. \" He rates this pain at 7/10. He denies any pain on exertion or movement. Patient denies personal or family history of heart disease. Patient was admitted to the hospital 2.5 weeks ago for diverticulitis and GI bleed. He says that upon discharge he was prescribed a 7 day course of Levaquin to take at home. He claims the Levaquin made him dizzy when he took it. There are no other acute medical concerns at this time. PCP: Vin Pyle MD    Note written by honorio Hassan, as dictated by Melissa Segovia MD 7:31 AM       The history is provided by the patient.         Past Medical History:   Diagnosis Date    Arthritis      OSTEO    Diabetes ([address]     Gastrointestinal disorder      acid reflux    GERD (gastroesophageal reflux disease)     History of seasonal allergies      \"HAY FEVER\"    Hypertension     Unspecified sleep apnea      USES CPAP       Past Surgical History:   Procedure Laterality Date    Hx other surgical       left eye implant     Hx other surgical       KIDNEY STONES REMOVED    Hx tonsil and adenoidectomy      Hx orthopaedic       BOOGIE KNEES    Hx orthopaedic       2014 rt thumb nerve release    Pr abdomen surgery proc unlisted       Gall bladder removed 2014         Family History:   Problem Relation Age of Onset    Diabetes Mother     Cancer Father      THROAT    Diabetes Sister     Diabetes Maternal Aunt     Alzheimer Maternal Uncle     Diabetes Maternal Aunt        Social History Social History    Marital status: SINGLE     Spouse name: N/A    Number of children: N/A    Years of education: N/A     Occupational History    Not on file. Social History Main Topics    Smoking status: Never Smoker    Smokeless tobacco: Never Used    Alcohol use No    Drug use: No    Sexual activity: Not on file     Other Topics Concern    Not on file     Social History Narrative         ALLERGIES: Review of patient's allergies indicates no known allergies. Review of Systems   Respiratory: Positive for shortness of breath. Cardiovascular: Positive for chest pain. Neurological: Positive for dizziness. All other systems reviewed and are negative. Vitals:    02/21/17 0704   BP: (!) 150/93   Pulse: 73   Resp: 16   Temp: 98.1 °F (36.7 °C)   SpO2: 99%   Weight: 95 kg (209 lb 8 oz)   Height: 5' 9\" (1.753 m)            Physical Exam   Constitutional: He is oriented to person, place, and time. He appears well-developed and well-nourished. No distress. HENT:   Head: Normocephalic and atraumatic. Eyes: Conjunctivae are normal. No scleral icterus. Neck: Neck supple. No tracheal deviation present. Cardiovascular: Normal rate, regular rhythm, normal heart sounds and intact distal pulses. Exam reveals no gallop and no friction rub. No murmur heard. Pulmonary/Chest: Effort normal and breath sounds normal. He has no wheezes. He has no rales. Abdominal: Soft. He exhibits no distension. There is no tenderness. There is no rebound and no guarding. Musculoskeletal: He exhibits no edema. Neurological: He is alert and oriented to person, place, and time. Skin: Skin is warm and dry. No rash noted. Psychiatric: He has a normal mood and affect. Nursing note and vitals reviewed. Note written by honorio Acuna, as dictated by Katelynn Marie MD 7:26 AM       [facility]  ED Course       Procedures  ED EKG interpretation:  Rhythm: normal sinus rhythm; and regular .  Rate (approx.): 69; Axis: normal; ST/T wave: normal; Note written by honorio Sutton, as dictated by Leslie Valladares MD 7:18 AM

## 2017-02-21 NOTE — ED NOTES
1:07 PM  Patient has atypical chest pain. 2 sets of enzymes done and are negative. CT PE study was negative.  Will discharge home with follow up for stress test.

## 2017-02-21 NOTE — ED TRIAGE NOTES
I was recently diagnosed with Diabetes but have to F/U with my PCP to get started on medication. My PCP also took me off my BP medication.

## 2017-02-21 NOTE — DISCHARGE INSTRUCTIONS
We hope that we have addressed all of your medical concerns. The examination and treatment you received in the Emergency Department were for an emergent problem and were not intended as complete care. It is important that you follow up with your healthcare provider(s) for ongoing care. If your symptoms worsen or do not improve as expected, and you are unable to reach your usual health care provider(s), you should return to the Emergency Department. Today's healthcare is undergoing tremendous change, and patient satisfaction surveys are one of the many tools to assess the quality of medical care. You may receive a survey from the [employer] regarding your experience in the Emergency Department. I hope that your experience has been completely positive, particularly the medical care that I provided. As such, please participate in the survey; anything less than excellent does not meet my expectations or intentions. [facility] and 8 [facility] participate in nationally recognized quality of care measures. If your blood pressure is greater than 120/80, as reported below, we urge that you seek medical care to address the potential of high blood pressure, commonly known as hypertension. Hypertension can be hereditary or can be caused by certain medical conditions, pain, stress, or \"white coat syndrome. \"       Please make an appointment with your health care provider(s) for follow up of your Emergency Department visit.        VITALS:   Patient Vitals for the past 8 hrs:   Temp Pulse Resp BP SpO2   02/21/17 1249 98 °F (36.7 °C) 72 20 120/71 100 %   02/21/17 1130 - 69 19 132/79 97 %   02/21/17 1100 - 68 20 (!) 126/91 95 %   02/21/17 1000 - 64 17 (!) 130/91 95 %   02/21/17 0930 - 65 18 134/78 95 %   02/21/17 0900 - 65 14 129/80 95 %   02/21/17 0830 - 70 15 111/83 96 %   02/21/17 0800 - 66 21 124/78 95 %   02/21/17 0745 - 68 12 130/85 94 %   02/21/17 0704 98.1 °F (36.7 °C) 73 16 (!) 150/93 99 %          Thank you for allowing us to provide you with medical care today. We realize that you have many choices for your emergency care needs. Please choose us in the future for any continued health care needs. Payton Cox, [address] [place], [place] [address].   Office: [phone]            Recent Results (from the past 24 hour(s))   EKG, 12 LEAD, INITIAL    Collection Time: 02/21/17  7:15 AM   Result Value Ref Range    Ventricular Rate 69 BPM    Atrial Rate 69 BPM    P-R Interval 134 ms    QRS Duration 88 ms    Q-T Interval 380 ms    QTC Calculation (Bezet) 407 ms    Calculated P Axis 17 degrees    Calculated R Axis -6 degrees    Calculated T Axis 39 degrees    Diagnosis       Normal sinus rhythm  When compared with ECG of 07-APR-2016 11:44,  premature atrial complexes are no longer present     CBC WITH AUTOMATED DIFF    Collection Time: 02/21/17  7:29 AM   Result Value Ref Range    WBC 5.6 4.1 - 11.1 K/uL    RBC 4.27 4. 10 - 5.70 M/uL    HGB 10.9 (L) 12.1 - 17.0 g/dL    HCT 35.2 (L) 36.6 - 50.3 %    MCV 82.4 80.0 - 99.0 FL    MCH 25.5 (L) 26.0 - 34.0 PG    MCHC 31.0 30.0 - 36.5 g/dL    RDW 15.1 (H) 11.5 - 14.5 %    PLATELET 195 318 - 292 K/uL    NEUTROPHILS 70 32 - 75 %    LYMPHOCYTES 22 12 - 49 %    MONOCYTES 5 5 - 13 %    EOSINOPHILS 2 0 - 7 %    BASOPHILS 1 0 - 1 %    ABS. NEUTROPHILS 4.0 1.8 - 8.0 K/UL    ABS. LYMPHOCYTES 1.2 0.8 - 3.5 K/UL    ABS. MONOCYTES 0.3 0.0 - 1.0 K/UL    ABS. EOSINOPHILS 0.1 0.0 - 0.4 K/UL    ABS.  BASOPHILS 0.0 0.0 - 0.1 K/UL   METABOLIC PANEL, COMPREHENSIVE    Collection Time: 02/21/17  7:29 AM   Result Value Ref Range    Sodium 140 136 - 145 mmol/L    Potassium 3.6 3.5 - 5.1 mmol/L    Chloride 105 97 - 108 mmol/L    CO2 27 21 - 32 mmol/L    Anion gap 8 5 - 15 mmol/L    Glucose 113 (H) 65 - 100 mg/dL    BUN 12 6 - 20 MG/DL    Creatinine 1.20 0.70 - 1.30 MG/DL    BUN/Creatinine ratio 10 (L) 12 - 20 GFR est AA >60 >60 ml/min/1.73m2    GFR est non-AA >60 >60 ml/min/1.73m2    Calcium 8.4 (L) 8.5 - 10.1 MG/DL    Bilirubin, total 0.4 0.2 - 1.0 MG/DL    ALT (SGPT) 18 12 - 78 U/L    AST (SGOT) 11 (L) 15 - 37 U/L    Alk. phosphatase 69 45 - 117 U/L    Protein, total 7.1 6.4 - 8.2 g/dL    Albumin 3.2 (L) 3.5 - 5.0 g/dL    Globulin 3.9 2.0 - 4.0 g/dL    A-G Ratio 0.8 (L) 1.1 - 2.2     CK W/ REFLX CKMB    Collection Time: 02/21/17  7:29 AM   Result Value Ref Range    CK 74 39 - 308 U/L   TROPONIN I    Collection Time: 02/21/17  7:29 AM   Result Value Ref Range    Troponin-I, Qt. <0.04 <0.05 ng/mL   PROTHROMBIN TIME + INR    Collection Time: 02/21/17  9:18 AM   Result Value Ref Range    INR 1.1 0.9 - 1.1      Prothrombin time 11.4 (H) 9.0 - 11.1 sec   PTT    Collection Time: 02/21/17  9:18 AM   Result Value Ref Range    aPTT 26.3 22.1 - 32.5 sec    aPTT, therapeutic range     58.0 - 77.0 SECS   D DIMER    Collection Time: 02/21/17  9:18 AM   Result Value Ref Range    D-dimer 1.05 (H) 0.00 - 0.65 mg/L FEU       Cta Chest W Wo Cont    Result Date: 2/21/2017  EXAM:  CTA CHEST W WO CONT INDICATION:  Dizziness and chest tightness this morning when waking up. Shortness of breath. COMPARISON: CT chest angiography 11/30/2010. Chest radiograph 2/21/2017. TECHNIQUE: Helical thin section chest CT following uneventful intravenous administration of nonionic contrast 85 mL of Isovue-370 according to departmental PE protocol. Coronal and sagittal reformats were performed. 3D/MIP post processing was performed. CT dose reduction was achieved through use of a standardized protocol tailored for this examination and automatic exposure control for dose modulation. Adaptive statistical iterative reconstruction (ASIR) was utilized. FINDINGS: This is a good quality study for the evaluation of pulmonary embolism to the first subsegmental arterial level. There is no pulmonary embolism to this level.  The aorta and main pulmonary artery are normal in caliber. Cardiac size is within normal limits. No pericardial effusion. No lymphadenopathy by imaging size criteria. 2 mm and 3 mm lung nodules the right middle lobe and right lower lobe are stable and benign. There is no new lung nodule, mass, or consolidation. No pleural effusion or pneumothorax. Central airways are unremarkable. There is a small hiatal hernia. There is no acute abnormality in the visualized upper abdomen. There is no aggressive blastic or lytic bony lesion. IMPRESSION: There is no acute pulmonary embolism or other acute abnormality in the chest.     Xr Chest Port    Result Date: 2/21/2017  EXAM: Portable CXR.  0745 hours  INDICATION: Woke this morning with dizziness and pressure, tightness in chest. The lungs are clear. Heart is normal in size. There is no overt pulmonary edema. There is no evident pneumothorax, apparent adenopathy or sizable pleural effusion. IMPRESSION: No Acute Disease. Chest Pain: Care Instructions  Your Care Instructions  There are many things that can cause chest pain. Some are not serious and will get better on their own in a few days. But some kinds of chest pain need more testing and treatment. Your doctor may have recommended a follow-up visit in the next 8 to 12 hours. If you are not getting better, you may need more tests or treatment. Even though your doctor has released you, you still need to watch for any problems. The doctor carefully checked you, but sometimes problems can develop later. If you have new symptoms or if your symptoms do not get better, get medical care right away. If you have worse or different chest pain or pressure that lasts more than 5 minutes or you passed out (lost consciousness), call 911 or seek other emergency help right away. A medical visit is only one step in your treatment.  Even if you feel better, you still need to do what your doctor recommends, such as going to all suggested follow-up appointments and taking medicines exactly as directed. This will help you recover and help prevent future problems. How can you care for yourself at home? · Rest until you feel better. · Take your medicine exactly as prescribed. Call your doctor if you think you are having a problem with your medicine. · Do not drive after taking a prescription pain medicine. When should you call for help? Call 911 if:  · You passed out (lost consciousness). · You have severe difficulty breathing. · You have symptoms of a heart attack. These may include:  ¨ Chest pain or pressure, or a strange feeling in your chest.  ¨ Sweating. ¨ Shortness of breath. ¨ Nausea or vomiting. ¨ Pain, pressure, or a strange feeling in your back, neck, jaw, or upper belly or in one or both shoulders or arms. ¨ Lightheadedness or sudden weakness. ¨ A fast or irregular heartbeat. After you call 911, the  may tell you to chew 1 adult-strength or 2 to 4 low-dose aspirin. Wait for an ambulance. Do not try to drive yourself. Call your doctor today if:  · You have any trouble breathing. · Your chest pain gets worse. · You are dizzy or lightheaded, or you feel like you may faint. · You are not getting better as expected. · You are having new or different chest pain. Where can you learn more? Go to http://maritza-shalom.info/. Enter A120 in the search box to learn more about \"Chest Pain: Care Instructions. \"  Current as of: May 27, 2016  Content Version: 11.1  © 7712-0383 [employer]. Care instructions adapted under license by [employer] (which disclaims liability or warranty for this information). If you have questions about a medical condition or this instruction, always ask your healthcare professional. [address] any warranty or liability for your use of this information.

## 2017-02-22 LAB
ATRIAL RATE: 68 BPM
CALCULATED P AXIS, ECG09: 51 DEGREES
CALCULATED R AXIS, ECG10: 6 DEGREES
CALCULATED T AXIS, ECG11: 27 DEGREES
DIAGNOSIS, 93000: NORMAL
P-R INTERVAL, ECG05: 148 MS
Q-T INTERVAL, ECG07: 412 MS
QRS DURATION, ECG06: 80 MS
QTC CALCULATION (BEZET), ECG08: 438 MS
VENTRICULAR RATE, ECG03: 68 BPM

## 2019-10-25 ENCOUNTER — OFFICE VISIT (OUTPATIENT)
Dept: NEUROLOGY | Age: 64
End: 2019-10-25

## 2019-10-25 VITALS
SYSTOLIC BLOOD PRESSURE: 124 MMHG | HEIGHT: 69 IN | HEART RATE: 64 BPM | OXYGEN SATURATION: 98 % | TEMPERATURE: 98.8 F | BODY MASS INDEX: 28.05 KG/M2 | WEIGHT: 189.4 LBS | RESPIRATION RATE: 18 BRPM | DIASTOLIC BLOOD PRESSURE: 81 MMHG

## 2019-10-25 DIAGNOSIS — G37.9 DEMYELINATING DISEASE (HCC): ICD-10-CM

## 2019-10-25 DIAGNOSIS — M31.6 GCA (GIANT CELL ARTERITIS) (HCC): ICD-10-CM

## 2019-10-25 DIAGNOSIS — R53.82 CHRONIC FATIGUE: ICD-10-CM

## 2019-10-25 DIAGNOSIS — G43.109 MIGRAINE WITH VERTIGO: ICD-10-CM

## 2019-10-25 DIAGNOSIS — R26.9 GAIT DISORDER: ICD-10-CM

## 2019-10-25 DIAGNOSIS — M31.6 GCA (GIANT CELL ARTERITIS) (HCC): Primary | ICD-10-CM

## 2019-10-25 DIAGNOSIS — R42 DIZZINESS: ICD-10-CM

## 2019-10-25 DIAGNOSIS — E55.9 VITAMIN D DEFICIENCY: ICD-10-CM

## 2019-10-25 RX ORDER — AMLODIPINE BESYLATE 2.5 MG/1
TABLET ORAL
Refills: 2 | COMMUNITY
Start: 2019-08-02 | End: 2021-06-22

## 2019-10-25 RX ORDER — ATORVASTATIN CALCIUM 80 MG/1
80 TABLET, FILM COATED ORAL DAILY
COMMUNITY
End: 2021-06-22

## 2019-10-25 RX ORDER — NITROGLYCERIN 0.4 MG/1
0.4 TABLET SUBLINGUAL
COMMUNITY

## 2019-10-25 RX ORDER — LISINOPRIL 5 MG/1
5 TABLET ORAL DAILY
COMMUNITY

## 2019-10-25 RX ORDER — SCOLOPAMINE TRANSDERMAL SYSTEM 1 MG/1
1 PATCH, EXTENDED RELEASE TRANSDERMAL
Qty: 10 PATCH | Refills: 1 | Status: SHIPPED | OUTPATIENT
Start: 2019-10-25 | End: 2021-06-22

## 2019-10-25 RX ORDER — CARVEDILOL 3.12 MG/1
3.12 TABLET ORAL 2 TIMES DAILY
COMMUNITY

## 2019-10-25 RX ORDER — ASPIRIN 81 MG/1
TABLET ORAL
Refills: 3 | COMMUNITY
Start: 2019-08-02

## 2019-10-25 RX ORDER — PREDNISONE 20 MG/1
20 TABLET ORAL 2 TIMES DAILY
Qty: 20 TAB | Refills: 0 | Status: SHIPPED | OUTPATIENT
Start: 2019-10-25 | End: 2021-06-22

## 2019-10-25 RX ORDER — CLOPIDOGREL BISULFATE 75 MG/1
75 TABLET ORAL DAILY
COMMUNITY

## 2019-10-25 RX ORDER — ISOSORBIDE MONONITRATE 20 MG/1
TABLET ORAL 2 TIMES DAILY
COMMUNITY
End: 2021-06-22

## 2019-10-25 NOTE — PATIENT INSTRUCTIONS
All test results will be discussed at the next appointment. MRI of the Head: About This Test  What is it? MRI (magnetic resonance imaging) is a test that uses a magnetic field and pulses of radio wave energy to make pictures of the organs and structures inside the body. An MRI of the head can give your doctor information about your brain, eyes, ears, and nerves. When you have an MRI, you lie on a table and the table moves into the MRI machine. Why is this test done? An MRI of the head can help find problems such as tumors and infection. It also can check symptoms of a head injury or of diseases such as multiple sclerosis (MS) and stroke. How can you prepare for the test?  Talk to your doctor about all your health conditions before the test. For example, tell your doctor if:  · You are allergic to any medicines. · You are or might be pregnant. · You have a pacemaker, an artificial limb, any metal pins or metal parts in your body, metal heart valves, metal clips in your brain, metal implants in your ears, or any other implanted or prosthetic medical device. · You have an intrauterine device (IUD) in place. · You get nervous in confined spaces. You may need medicine to help you relax. · You wear a patch that contains medicine. · You have kidney disease. What happens before the test?  · You will remove all metal objects, such as hearing aids, dentures, jewelry, watches, and hairpins. · You may need to take off some of your clothes. You will be given a gown to wear during the test. If you do leave some clothes on, make sure you take everything out of your pockets. · You may have contrast material (dye) put into your arm through a tube called an IV. Contrast material helps doctors see specific organs, blood vessels, and most tumors. What happens during the test?  · You will lie on your back on a table that is part of the MRI scanner.  Your head, chest, and arms may be held with straps to help you lie still.  · The table will slide into the space that contains the magnet. A device called a coil may be placed over or wrapped around your head. · Inside the scanner you will hear a fan and feel air moving. You may hear tapping, thumping, or snapping noises. You may be given earplugs or headphones to reduce the noise. · You will be asked to hold still during the scan. You may be asked to hold your breath for short periods. · You may be alone in the scanning room, but a technologist will be watching you through a window and talking with you during the test.  What else should you know about the test?  · An MRI does not hurt. · You may feel warmth in the area being examined. This is normal.  · A dye (contrast material) that contains gadolinium may be used in this test. Be sure to tell your doctor if:  ? You are pregnant or think you may be pregnant. ? You have kidney problems. ? You've had more than one test that used gadolinium. · The U.S. Food and Drug Administration (FDA) has safety warnings about gadolinium. But for most people, the benefit of its use in this test outweighs the risk. · If a dye is used, you may feel a quick sting or pinch and some coolness when the IV is started. The dye may give you a metallic taste in your mouth. Some people feel sick to their stomach or get a headache. · If you breastfeed and are concerned about whether the dye used in this test is safe, talk to your doctor. Most experts believe that very little dye passes into breast milk and even less is passed on to the baby. But if you prefer, you can store some of your breast milk ahead of time and use it for a day or two after the test.  How long does the test take? · The test usually takes 30 to 60 minutes but can take as long as 2 hours. What happens after the test?  · You will probably be able to go home right away, depending on the reason for the test.  · You can go back to your usual activities right away.   Follow-up care is a key part of your treatment and safety. Be sure to make and go to all appointments, and call your doctor if you are having problems. It's also a good idea to keep a list of the medicines you take. Ask your doctor when you can expect to have your test results. Where can you learn more? Go to http://maritza-shalom.info/. Enter H262 in the search box to learn more about \"MRI of the Head: About This Test.\"  Current as of: March 28, 2019  Content Version: 12.2  © 0555-0123 [employer]. Care instructions adapted under license by [employer] (which disclaims liability or warranty for this information). If you have questions about a medical condition or this instruction, always ask your healthcare professional. [address] any warranty or liability for your use of this information. Electroencephalogram (EEG): About This Test  What is it? An electroencephalogram (EEG) lets a doctor see the electrical activity of your brain. You will have small pads or patches attached to different places on your head. These are called electrodes. Wires connect the electrodes to a computer. The computer records the activity of the brain. This looks like wavy lines on the computer screen or on paper. Why is this test done? The test is often used to diagnose epilepsy. It helps a doctor know what types of seizures are happening. An EEG can also check brain activity in people with sleep disorders. It can also help a doctor know why a person passed out (lost consciousness). How can you prepare for the test?  · Tell your doctor if you are taking any medicines. Your doctor may ask you to stop taking certain medicines before the test. These include sedatives and tranquilizers, muscle relaxants, sleeping aids, and seizure medicines.   · Do not eat or drink anything with caffeine in it for 12 hours before the test. This includes cola, energy drinks, and chocolate. · Shampoo your hair and rinse with clear water the evening before or the morning of the test. Do not put any hair conditioner or oil on after you wash your hair. · Your doctor may ask you not to sleep the night before the test or to sleep for only about 4 or 5 hours. This is because some types of brain activity can only be seen if you are asleep. If your doctor asks you to get less sleep than normal, plan to have someone drive you to and from the test.  What happens during the test?  · You will lie on your back on a bed or table. Or you might relax in a chair with your eyes closed. · A technologist will attach the electrodes to different places on your head. Or you might get a cap with fixed electrodes on it. · You will lie still with your eyes closed. The technologist will tell you not to talk unless you need to. · The technologist may ask you to:  ? Breathe deeply and rapidly. This is called hyperventilating. ? Look at a bright, flashing light called a strobe. ? Go to sleep. If you can't fall asleep, you may get medicine to help you. What else should you know about the test?  · There is no pain. No electrical current goes through your body. · If you have a seizure disorder such as epilepsy, the flashing lights or hyperventilation may cause a seizure. The technologist is trained to take care of you if this happens. · If electrodes are put in your nose, they may tickle. In rare cases, they can also cause some soreness or a small amount of bleeding for 1 to 2 days after the test.  How long does the test take? · The test will take about 1 to 2 hours. What happens after the test?  · You will probably be able to go home right away. But if you didn't sleep your normal amount before the test, have someone drive you home. · You can go back to your usual activities right away. When should you call for help?   Watch closely for changes in your health, and be sure to contact your doctor if:  · You have any problems that you think may be from the test.  · You have any questions about the test or have not received your results. Follow-up care is a key part of your treatment and safety. Be sure to make and go to all appointments, and call your doctor if you are having problems. It's also a good idea to keep a list of the medicines you take. Ask your doctor when you can expect to have your test results. Where can you learn more? Go to http://maritza-shalom.info/. Enter F196 in the search box to learn more about \"Electroencephalogram (EEG): About This Test.\"  Current as of: March 28, 2019  Content Version: 12.2  © 1196-5036 [employer]. Care instructions adapted under license by [employer] (which disclaims liability or warranty for this information). If you have questions about a medical condition or this instruction, always ask your healthcare professional. [address] any warranty or liability for your use of this information.

## 2019-11-01 LAB
25(OH)D3+25(OH)D2 SERPL-MCNC: 10 NG/ML (ref 30–100)
ACE SERPL-CCNC: 25 U/L (ref 14–82)
ALBUMIN SERPL ELPH-MCNC: 3.6 G/DL (ref 2.9–4.4)
ALBUMIN/GLOB SERPL: 1.2 {RATIO} (ref 0.7–1.7)
ALDOLASE SERPL-CCNC: 5.7 U/L (ref 3.3–10.3)
ALPHA1 GLOB SERPL ELPH-MCNC: 0.2 G/DL (ref 0–0.4)
ALPHA2 GLOB SERPL ELPH-MCNC: 0.8 G/DL (ref 0.4–1)
ANA SER QL: NEGATIVE
B-GLOBULIN SERPL ELPH-MCNC: 0.9 G/DL (ref 0.7–1.3)
CK SERPL-CCNC: 51 U/L (ref 24–204)
ERYTHROCYTE [SEDIMENTATION RATE] IN BLOOD BY WESTERGREN METHOD: 2 MM/HR (ref 0–30)
GAMMA GLOB SERPL ELPH-MCNC: 1.1 G/DL (ref 0.4–1.8)
GLOBULIN SER CALC-MCNC: 2.9 G/DL (ref 2.2–3.9)
M PROTEIN SERPL ELPH-MCNC: NORMAL G/DL
PLEASE NOTE, 011150: NORMAL
PROT SERPL-MCNC: 6.5 G/DL (ref 6–8.5)
RHEUMATOID FACT SERPL-ACNC: <10 IU/ML (ref 0–13.9)
RPR SER QL: NON REACTIVE
VIT B12 SERPL-MCNC: 504 PG/ML (ref 232–1245)

## 2019-11-12 ENCOUNTER — HOSPITAL ENCOUNTER (OUTPATIENT)
Dept: MRI IMAGING | Age: 64
Discharge: HOME | End: 2019-11-12
Attending: PSYCHIATRY & NEUROLOGY
Payer: COMMERCIAL

## 2019-11-12 ENCOUNTER — HOSPITAL ENCOUNTER (OUTPATIENT)
Dept: NEUROLOGY | Age: 64
Discharge: HOME | End: 2019-11-12
Attending: PSYCHIATRY & NEUROLOGY
Payer: COMMERCIAL

## 2019-11-12 DIAGNOSIS — R42 DIZZINESS: ICD-10-CM

## 2019-11-12 DIAGNOSIS — G37.9 DEMYELINATING DISEASE (HCC): ICD-10-CM

## 2019-11-12 PROCEDURE — 95816 EEG AWAKE AND DROWSY: CPT

## 2019-11-12 PROCEDURE — A9575 INJ GADOTERATE MEGLUMI 0.1ML: HCPCS | Performed by: PSYCHIATRY & NEUROLOGY

## 2019-11-12 PROCEDURE — 74011250636 HC RX REV CODE- 250/636: Performed by: PSYCHIATRY & NEUROLOGY

## 2019-11-12 PROCEDURE — 70553 MRI BRAIN STEM W/O & W/DYE: CPT

## 2019-11-12 RX ORDER — GADOTERATE MEGLUMINE 376.9 MG/ML
20 INJECTION INTRAVENOUS
Status: COMPLETED | OUTPATIENT
Start: 2019-11-12 | End: 2019-11-12

## 2019-11-12 RX ADMIN — GADOTERATE MEGLUMINE 20 ML: 376.9 INJECTION INTRAVENOUS at 09:29

## 2019-11-14 NOTE — PROCEDURES
[facility]  EEG    Name:  Von Chacon  MR#:  [id number]  :  1955  ACCOUNT #:  [de-identified]  DATE OF SERVICE:  2019    REFERRING PROVIDER:  Dr. Everett Sox:  An EEG is requested on this 60year-old with paroxysmal dizziness to evaluate for epileptiform abnormality. MEDICATIONS:  Not listed. EEG REPORT:  This tracing is obtained during the awake state. During wakefulness, there are brief intermittent runs of posteriorly dominant and symmetric low-to-medium amplitude 9-cycle-per-second activities, which attenuate with eye opening. Lower voltage, faster frequency activities are seen symmetrically over the anterior head regions. Hyperventilation not performed due to past history of stroke. Intermittent photic stimulation little alters the tracing. Sleep is not attained. IMPRESSION:  Interpretation of this EEG recorded during the awake state is normal.  No epileptiform abnormalities are seen.       Marie Sykes MD [id number]  D:  2019 8:13  T:  2019 14:25  JOB #:  [id number]

## 2019-12-30 NOTE — PROGRESS NOTES
Neurology Consult Note      HISTORY PROVIDED BY: patient    Chief Complaint:   Chief Complaint   Patient presents with    Dizziness    New Patient      Subjective:    Desiree Luna is a 60 y.o. right handed male who presents in consultation for dizziness. This is a 29-year-old right-handed black with history of degenerative joint disease, diabetes mellitus, hypertension, esophageal reflux disease, who was referred to the clinic to evaluate for dizziness or vertigo. Patient says about 9 months ago, he started experiencing dizzy feeling, initially it was as if he is unsteady but later on, he started feeling as if the place is spinning. He however says is mostly when he gets up from sitting to standing position or when tilting his head to the left that is from right to left that he feels the spinning sensation. He noted that he has had a blackout spell because of the dizzy feeling. About 2 days prior to presentation, patient says he started experiencing persistent headache, because of the persistent headache, patient becomes much more concern. Headache is throbbing in nature mostly frontal, associated with increased dizziness and vertigo, no photophobia phonophobia, no nausea or vomiting. He says he has tried different medication for the headache without much help. He also describes the headache as nagging or annoying in nature. He also noted that he has been having numbness and tingling sensation of the extremities that would come and go. He denies incontinence, dysphagia or odynophagia.   Review of Systems - General ROS: positive for  - fatigue and sleep disturbance  Psychological ROS: positive for - anxiety, depression and sleep disturbances  Ophthalmic ROS: positive for - decreased vision  ENT ROS: positive for - headaches, tinnitus, vertigo and visual changes  Allergy and Immunology ROS: negative  Hematological and Lymphatic ROS: negative  Endocrine ROS: negative  Respiratory ROS: no cough, shortness of breath, or wheezing  Cardiovascular ROS: no chest pain or dyspnea on exertion  Gastrointestinal ROS: no abdominal pain, change in bowel habits, or black or bloody stools  Genito-Urinary ROS: no dysuria, trouble voiding, or hematuria  Musculoskeletal ROS: positive for - gait disturbance, joint pain, joint swelling, muscle pain and muscular weakness  Neurological ROS: positive for - dizziness, headaches, impaired coordination/balance, numbness/tingling, visual changes and weakness  Dermatological ROS: negative  Past Medical History:   Diagnosis Date    Arthritis     OSTEO    Chest pain     Diabetes ([facility] [address]     Fainting     Gastrointestinal disorder     acid reflux    GERD (gastroesophageal reflux disease)     History of seasonal allergies     \"HAY FEVER\"    Hypertension     SOB (shortness of breath)     Unintentional weight change     Unspecified sleep apnea     USES CPAP    Vertigo     Visual disturbance       Past Surgical History:   Procedure Laterality Date    ABDOMEN SURGERY PROC UNLISTED      Gall bladder removed 2014    HX ORTHOPAEDIC      BOOGIE KNEES    HX ORTHOPAEDIC      2014 rt thumb nerve release    HX OTHER SURGICAL      left eye implant     HX OTHER SURGICAL      KIDNEY STONES REMOVED    HX TONSIL AND ADENOIDECTOMY        Social History     Socioeconomic History    Marital status: SINGLE     Spouse name: Not on file    Number of children: Not on file    Years of education: Not on file    Highest education level: Not on file   Occupational History    Not on file   Social Needs    Financial resource strain: Not on file    Food insecurity:     Worry: Not on file     Inability: Not on file    Transportation needs:     Medical: Not on file     Non-medical: Not on file   Tobacco Use    Smoking status: Never Smoker    Smokeless tobacco: Never Used   Substance and Sexual Activity    Alcohol use: No    Drug use: No    Sexual activity: Not on file   Lifestyle    Physical activity: Days per week: Not on file     Minutes per session: Not on file    Stress: Not on file   Relationships    Social connections:     Talks on phone: Not on file     Gets together: Not on file     Attends [employer] service: Not on file     Active member of club or organization: Not on file     Attends meetings of clubs or organizations: Not on file     Relationship status: Not on file    Intimate partner violence:     Fear of current or ex partner: Not on file     Emotionally abused: Not on file     Physically abused: Not on file     Forced sexual activity: Not on file   Other Topics Concern    Not on file   Social History Narrative    Not on file     Family History   Problem Relation Age of Onset    Diabetes Mother     Cancer Father         THROAT    Diabetes Sister     Diabetes Maternal Aunt     Alzheimer Maternal Uncle     Diabetes Maternal Aunt          Objective:   ROS  As per HPI  Allergies   Allergen Reactions    Contrast Agent [Iodine] Hives        Meds:  Outpatient Medications Prior to Visit   Medication Sig Dispense Refill    amLODIPine (NORVASC) 2.5 mg tablet TK 1 T PO D  2    aspirin delayed-release 81 mg tablet TK 1 T PO D  3    atorvastatin (LIPITOR) 80 mg tablet Take 80 mg by mouth daily.  carvedilol (COREG) 3.125 mg tablet Take  by mouth two (2) times daily (with meals).  clopidogrel (PLAVIX) 75 mg tab Take  by mouth.  ferrous sulfate 325 mg (65 mg iron) cpER Take  by mouth three (3) times daily.  isosorbide mononitrate (ISMO, MONOKET) 20 mg tablet Take  by mouth two (2) times a day.  lisinopril (PRINIVIL, ZESTRIL) 5 mg tablet Take  by mouth daily.  nitroglycerin (NITROSTAT) 0.4 mg SL tablet 0.4 mg by SubLINGual route every five (5) minutes as needed for Chest Pain. Up to 3 doses.  pantoprazole (PROTONIX) 40 mg tablet Take 1 Tab by mouth Daily (before breakfast). 30 Tab 1     No facility-administered medications prior to visit.         Imaging:  MRI Results (most recent):  Results from Hospital Encounter encounter on 11/12/19   MRI BRAIN W WO CONT    Narrative EXAM: MRI BRAIN W WO CONT    TECHNIQUE: Brain images including sagittal and axial T1-weighted, axial FLAIR,  T2-weighted, diffusion weighted, gradient echo, sagittal T2 FLAIR precontrast.  Axial and coronal postcontrast T1-weighted images. IV CONTRAST:  20 cc Dotarem    INDICATION:  demyelinating disease dizziness, history of stroke and MI    COMPARISON:  CT head of 12/20/2015    FINDINGS:  BRAIN PARENCHYMA:    1. No acute infarct. No mass or abnormal enhancement. 2. Moderately extensive cerebral white matter signal abnormality involving the  periventricular white matter and centrum semiovale probably. Chronic bilateral  thalamic, right basal ganglia, and right pontine lacunar infarcts. These  features favor intracranial small vessel disease. Lesions in the corpus callosum  and white matter adjacent to the fourth ventricle on the right suggests that  there may be underlying demyelinating disease. Foci of T1 hypointensity in the  bifrontal white matter may represent chronic white matter lacunar infarcts or  foci of chronic demyelination. INTRACRANIAL HEMORRHAGE: Chronic microhemorrhage in the right pontine and right  basal ganglia lacunar infarcts. CSF SPACES:  Normal in size and morphology for the patient's age. BASAL CISTERNS:  Patent. MIDLINE SHIFT: None. VASCULAR SYSTEM:  Normal flow voids. PARANASAL SINUSES AND MASTOID AIR CELLS:  No significant opacification. VISUALIZED ORBITS:  No significant abnormalities. Previous left cataract  surgery. VISUALIZED UPPER CERVICAL SPINE:  No significant abnormalities. SELLA:  No enlargement or  focal abnormality. SKULL BASE:  No significant abnormalities. Cerebellar tonsils in normal  position. CALVARIUM:  Intact. Impression IMPRESSION:    1. No acute findings. 2. Moderately extensive cerebral white matter signal abnormality. Overall,  features favor chronic small vessel ischemic changes. Underlying demyelinating  disease may contribute to the abnormalities. CT Results (most recent):  Results from Hospital Encounter encounter on 02/21/17   CTA CHEST W WO CONT    Narrative EXAM:  CTA CHEST W WO CONT    INDICATION:  Dizziness and chest tightness this morning when waking up. Shortness of breath. COMPARISON: CT chest angiography 11/30/2010. Chest radiograph 2/21/2017. TECHNIQUE: Helical thin section chest CT following uneventful intravenous  administration of nonionic contrast 85 mL of Isovue-370 according to  departmental PE protocol. Coronal and sagittal reformats were performed. 3D/MIP  post processing was performed. CT dose reduction was achieved through use of a  standardized protocol tailored for this examination and automatic exposure  control for dose modulation. Adaptive statistical iterative reconstruction  (ASIR) was utilized. FINDINGS: This is a good quality study for the evaluation of pulmonary embolism  to the first subsegmental arterial level. There is no pulmonary embolism to this  level. The aorta and main pulmonary artery are normal in caliber. Cardiac size is  within normal limits. No pericardial effusion. No lymphadenopathy by imaging  size criteria. 2 mm and 3 mm lung nodules the right middle lobe and right lower lobe are stable  and benign. There is no new lung nodule, mass, or consolidation. No pleural  effusion or pneumothorax. Central airways are unremarkable. There is a small hiatal hernia. There is no acute abnormality in the visualized  upper abdomen. There is no aggressive blastic or lytic bony lesion.       Impression IMPRESSION:   There is no acute pulmonary embolism or other acute abnormality in the chest.          [facility]ed [facility] today    Lab Review   Results for orders placed or performed in visit on 10/25/19   ALDOLASE   Result Value Ref Range Aldolase 5.7 3.3 - 10.3 U/L   VITAMIN B12   Result Value Ref Range    Vitamin B12 504 232 - 1,245 pg/mL   RHEUMATOID FACTOR, QL   Result Value Ref Range    Rheumatoid factor <10.0 0.0 - 13.9 IU/mL   TIFF, DIRECT, W/REFLEX   Result Value Ref Range    Antinuclear Antibodies Direct Negative Negative   RPR   Result Value Ref Range    RPR Non Reactive Non Reactive   ANGIOTENSIN CONVERTING ENZYME   Result Value Ref Range    Angiotensin Converting Enzyme (ACE) 25 14 - 82 U/L   CK   Result Value Ref Range    Creatine Kinase,Total 51 24 - 204 U/L   PROTEIN ELECTROPHORESIS   Result Value Ref Range    Protein, total 6.5 6.0 - 8.5 g/dL    Albumin 3.6 2.9 - 4.4 g/dL    Alpha-1-globulin 0.2 0.0 - 0.4 g/dL    ALPHA-2 GLOBULIN 0.8 0.4 - 1.0 g/dL    Beta globulin 0.9 0.7 - 1.3 g/dL    Gamma globulin 1.1 0.4 - 1.8 g/dL    M-Gabe Not Observed Not Observed g/dL    Globulin, total 2.9 2.2 - 3.9 g/dL    A/G ratio 1.2 0.7 - 1.7    Please note Comment    VITAMIN D, 25 HYDROXY   Result Value Ref Range    VITAMIN D, 25-HYDROXY 10.0 (L) 30.0 - 100.0 ng/mL   SED RATE (ESR)   Result Value Ref Range    Sed rate (ESR) 2 0 - 30 mm/hr        Exam:  Visit Vitals  /81 (BP 1 Location: Left arm, BP Patient Position: Sitting)   Pulse 64   Temp 98.8 °F (37.1 °C) (Temporal)   Resp 18   Ht 5' 9\" (1.753 m)   Wt 189 lb 6.4 oz (85.9 kg)   SpO2 98%   BMI 27.97 kg/m²     General:  Alert, cooperative, no distress. Head:  Normocephalic, without obvious abnormality, atraumatic. Respiratory:  Heart:   Non labored breathing  Regular rate and rhythm, no murmurs   Neck:   2+ carotids, no bruits   Extremities: Warm, no cyanosis or edema. Pulses: 2+ radial pulses. Neurologic:  MS: Alert and oriented x 4, speech intact. Language intact, able to name, repeat, and follow all commands. Attention and fund of knowledge appropriate. Recent and remote memory intact.   Cranial Nerves:  II: visual fields Full to confrontation   II: pupils Equal, round, reactive to light   II: optic disc No papilledema   III,VII: ptosis none   III,IV,VI: extraocular muscles  EOMI, no nystagmus or diplopia   V: facial light touch sensation  normal   VII: facial muscle function   symmetric   VIII: hearing intact   IX: soft palate elevation  normal   XI: trapezius strength  5/5   XI: sternocleidomastoid strength 5/5   XII: tongue  Midline     Motor: normal bulk and tone, no tremor              Strength: 5/5 throughout, no PD  Sensory equivocal sensation to LT, PP, temperature and vibration  Coordination: FTN and HTS intact, MAEGAN intact,Romberg negative  Gait: Slightly unsteady gait, able to heel, toe, and tandem walk  Reflexes: 2+ symmetric. Plantar: Withdrawn           Assessment/Plan       ICD-10-CM ICD-9-CM    1. GCA (giant cell arteritis) ([facility]) M31.6 446.5 SED RATE (ESR)   2. Dizziness R42 780.4 MRI BRAIN W WO CONT      EEG      ALDOLASE      VITAMIN B12      RHEUMATOID FACTOR, QL      TIFF, DIRECT, W/REFLEX      SED RATE (ESR)      RPR      ANGIOTENSIN CONVERTING ENZYME      CK      PROTEIN ELECTROPHORESIS   3. Demyelinating disease ([facility]) G37.9 341.9 MRI BRAIN W WO CONT      ALDOLASE      VITAMIN B12      RHEUMATOID FACTOR, QL      TIFF, DIRECT, W/REFLEX      SED RATE (ESR)      ANGIOTENSIN CONVERTING ENZYME      CK      PROTEIN ELECTROPHORESIS   4. Chronic fatigue R53.82 780.79    5. Gait disorder R26.9 781.2    6. Migraine with vertigo G43.109 346.80      780.4    7. Vitamin D deficiency E55.9 268.9 VITAMIN D, 25 HYDROXY   Plan:  Scopolamine patch 1.0 mg every 72 hours  Prednisone 20 mg p.o. twice daily for 10 days  MRI of the brain with and without gadolinium  EEG  Blood for autoimmune work-up, vitamin D, B12, ESR, RPR, CK, aldolase, protein serum electrophoresis    Follow-up and Dispositions    · Return in about 2 months (around 12/25/2019). Thank you very much for this consultation.     Signed:  Tereso Awad MD  10/25/2019

## 2019-12-31 ENCOUNTER — OFFICE VISIT (OUTPATIENT)
Dept: NEUROLOGY | Age: 64
End: 2019-12-31

## 2019-12-31 VITALS
DIASTOLIC BLOOD PRESSURE: 82 MMHG | SYSTOLIC BLOOD PRESSURE: 134 MMHG | WEIGHT: 194 LBS | HEART RATE: 69 BPM | HEIGHT: 69 IN | OXYGEN SATURATION: 97 % | BODY MASS INDEX: 28.73 KG/M2 | TEMPERATURE: 97.9 F | RESPIRATION RATE: 18 BRPM

## 2019-12-31 DIAGNOSIS — R42 DIZZINESS: Primary | ICD-10-CM

## 2019-12-31 DIAGNOSIS — G37.9 DEMYELINATING DISEASE (HCC): ICD-10-CM

## 2019-12-31 DIAGNOSIS — R90.82 WHITE MATTER DISEASE: ICD-10-CM

## 2019-12-31 DIAGNOSIS — R20.2 PARESTHESIA: ICD-10-CM

## 2019-12-31 RX ORDER — ERGOCALCIFEROL 1.25 MG/1
50000 CAPSULE ORAL
Qty: 8 CAP | Refills: 3 | Status: SHIPPED | OUTPATIENT
Start: 2019-12-31 | End: 2021-01-25 | Stop reason: SDUPTHER

## 2019-12-31 RX ORDER — PREDNISONE 20 MG/1
20 TABLET ORAL 2 TIMES DAILY
Qty: 40 TAB | Refills: 1 | Status: SHIPPED | OUTPATIENT
Start: 2019-12-31 | End: 2021-06-22

## 2019-12-31 NOTE — PROGRESS NOTES
Chief Complaint   Patient presents with    Dizziness     comes and goes, has dizziness on a daily basis      Visit Vitals  /82 (BP 1 Location: Left arm, BP Patient Position: Sitting)   Pulse 69   Temp 97.9 °F (36.6 °C) (Oral)   Resp 18   Ht 5' 9\" (1.753 m)   Wt 194 lb (88 kg)   SpO2 97%   BMI 28.65 kg/m²

## 2019-12-31 NOTE — PATIENT INSTRUCTIONS
[facility]   Statement to Patients  April 1, 2014      In an effort to ensure the large volume of patient prescription refills is processed in the most efficient and expeditious manner, we are asking our patients to assist us by calling your Pharmacy for all prescription refills, this will include also your  Mail Order Pharmacy. The pharmacy will contact our office electronically to continue the refill process. Please do not wait until the last minute to call your pharmacy. We need at least 48 hours (2days) to fill prescriptions. We also encourage you to call your pharmacy before going to  your prescription to make sure it is ready. With regard to controlled substance prescription refill requests (narcotic refills) that need to be picked up at our office, we ask your cooperation by providing us with at least 72 hours (3days) notice that you will need a refill. We will not refill narcotic prescription refill requests after 4:00pm on any weekday, Monday through Thursday, or after 2:00pm on Fridays, or on the weekends. We encourage everyone to explore another way of getting your prescription refill request processed using [employer], our patient web portal through our electronic medical record system. [employer] is an efficient and effective way to communicate your medication request directly to the office and  downloadable as an alisha on your smart phone . [employer] also features a review functionality that allows you to view your medication list as well as leave messages for your physician. Are you ready to get connected? If so please review the attatched instructions or speak to any of our staff to get you set up right away! Thank you so much for your cooperation. Should you have any questions please contact our Practice Administrator.     The Physicians and Staff,  [facility], [facility]

## 2020-01-01 NOTE — PROGRESS NOTES
Neurology Progress Note    NAME:  Manpreet Medeiros   :   1955   MRN:   [id number]     Date/Time:  2019  Subjective:      Manpreet Medeiros is a 60 y.o. male here today for dizziness, headaches, vertigo, test results. Patient says he still experiencing the dizziness and vertigo, but not as frequent, however, he says it comes suddenly goes away  Headache frequency is variable, a lot of time, associated with the vertigo. Headache is throbbing in nature mostly frontal associated with dizziness, blurry vision, occasional double vision, had visual obscuration. When the headache is stable vertigo, he experiences nausea, rarely vomiting. Patient says he stays tired. He experiences numbness and tingling sensation  He denies loss of consciousness, dysphagia, or odynophagia  MRI of the brain reviewed with patient shows white matter lesion consistent with demyelinating disease. Blood work shows severe vitamin D deficiency level of 10  EEG is unremarkable  I will obtain spinal tap on this patient for CSF analysis to evaluate for demyelinating disease multiple sclerosis  I will replace vitamin D with vitamin D2 50,000 units q. weekly. Prednisone 20 mg p.o. twice daily.   Review of Systems - General ROS: positive for  - fatigue and sleep disturbance  Psychological ROS: positive for - anxiety, depression and sleep disturbances  Ophthalmic ROS: positive for - decreased vision  ENT ROS: positive for - headaches, tinnitus, vertigo and visual changes  Allergy and Immunology ROS: negative  Hematological and Lymphatic ROS: negative  Endocrine ROS: negative  Respiratory ROS: no cough, shortness of breath, or wheezing  Cardiovascular ROS: no chest pain or dyspnea on exertion  Gastrointestinal ROS: no abdominal pain, change in bowel habits, or black or bloody stools  Genito-Urinary ROS: no dysuria, trouble voiding, or hematuria  Musculoskeletal ROS: positive for - gait disturbance, joint pain, joint swelling, muscle pain and muscular weakness  Neurological ROS: positive for - dizziness, headaches, impaired coordination/balance, numbness/tingling, visual changes and weakness  Dermatological ROS: negative    Medications reviewed:  Current Outpatient Medications   Medication Sig Dispense Refill    ergocalciferol (ERGOCALCIFEROL) 50,000 unit capsule Take 1 Cap by mouth every Tuesday and Friday. 8 Cap 3    predniSONE (DELTASONE) 20 mg tablet Take 20 mg by mouth two (2) times a day. 40 Tab 1    amLODIPine (NORVASC) 2.5 mg tablet TK 1 T PO D  2    aspirin delayed-release 81 mg tablet TK 1 T PO D  3    carvedilol (COREG) 3.125 mg tablet Take  by mouth two (2) times daily (with meals).  clopidogrel (PLAVIX) 75 mg tab Take  by mouth.  ferrous sulfate 325 mg (65 mg iron) cpER Take  by mouth three (3) times daily.  lisinopril (PRINIVIL, ZESTRIL) 5 mg tablet Take  by mouth daily.  nitroglycerin (NITROSTAT) 0.4 mg SL tablet 0.4 mg by SubLINGual route every five (5) minutes as needed for Chest Pain. Up to 3 doses.  predniSONE (DELTASONE) 20 mg tablet Take 20 mg by mouth two (2) times a day. 20 Tab 0    atorvastatin (LIPITOR) 80 mg tablet Take 80 mg by mouth daily.  isosorbide mononitrate (ISMO, MONOKET) 20 mg tablet Take  by mouth two (2) times a day.  scopolamine (TRANSDERM-SCOP) 1 mg over 3 days pt3d 1 Patch by TransDERmal route every seventy-two (72) hours. 10 Patch 1    pantoprazole (PROTONIX) 40 mg tablet Take 1 Tab by mouth Daily (before breakfast).  30 Tab 1        Objective:   Vitals:  Vitals:    12/31/19 0906   BP: 134/82   Pulse: 69   Resp: 18   Temp: 97.9 °F (36.6 °C)   TempSrc: Oral   SpO2: 97%   Weight: 194 lb (88 kg)   Height: 5' 9\" (1.753 m)   PainSc:   0 - No pain               Lab Data Reviewed:  Lab Results   Component Value Date/Time    WBC 5.6 02/21/2017 07:29 AM    HCT 35.2 (L) 02/21/2017 07:29 AM    HGB 10.9 (L) 02/21/2017 07:29 AM    PLATELET 878 10/56/4823 07:29 AM       Lab Results Component Value Date/Time    Sodium 140 02/21/2017 07:29 AM    Potassium 3.6 02/21/2017 07:29 AM    Chloride 105 02/21/2017 07:29 AM    CO2 27 02/21/2017 07:29 AM    Glucose 113 (H) 02/21/2017 07:29 AM    BUN 12 02/21/2017 07:29 AM    Creatinine 1.20 02/21/2017 07:29 AM    Calcium 8.4 (L) 02/21/2017 07:29 AM       No components found for: TROPQUANT    No results found for: TIFF      No results found for: HBA1C, HGBE8, XGK6KZRH, MCE9WZSE     Lab Results   Component Value Date/Time    Vitamin B12 504 10/30/2019 08:48 AM       No results found for: TIFF, ANARX, ANAIGG, XBANA    No results found for: CHOL, CHOLPOCT, CHOLX, CHLST, CHOLV, HDL, HDLPOC, HDLP, LDL, LDLCPOC, LDLC, DLDLP, VLDLC, VLDL, TGLX, TRIGL, TRIGP, TGLPOCT, CHHD, CHHDX      CT Results (recent):  Results from Hospital Encounter encounter on 02/21/17   CTA CHEST W WO CONT    Narrative EXAM:  CTA CHEST W WO CONT    INDICATION:  Dizziness and chest tightness this morning when waking up. Shortness of breath. COMPARISON: CT chest angiography 11/30/2010. Chest radiograph 2/21/2017. TECHNIQUE: Helical thin section chest CT following uneventful intravenous  administration of nonionic contrast 85 mL of Isovue-370 according to  departmental PE protocol. Coronal and sagittal reformats were performed. 3D/MIP  post processing was performed. CT dose reduction was achieved through use of a  standardized protocol tailored for this examination and automatic exposure  control for dose modulation. Adaptive statistical iterative reconstruction  (ASIR) was utilized. FINDINGS: This is a good quality study for the evaluation of pulmonary embolism  to the first subsegmental arterial level. There is no pulmonary embolism to this  level. The aorta and main pulmonary artery are normal in caliber. Cardiac size is  within normal limits. No pericardial effusion. No lymphadenopathy by imaging  size criteria.     2 mm and 3 mm lung nodules the right middle lobe and right lower lobe are stable  and benign. There is no new lung nodule, mass, or consolidation. No pleural  effusion or pneumothorax. Central airways are unremarkable. There is a small hiatal hernia. There is no acute abnormality in the visualized  upper abdomen. There is no aggressive blastic or lytic bony lesion. Impression IMPRESSION:   There is no acute pulmonary embolism or other acute abnormality in the chest.         MRI Results (recent):  Results from Hospital Encounter encounter on 11/12/19   MRI BRAIN W WO CONT    Narrative EXAM: MRI BRAIN W WO CONT    TECHNIQUE: Brain images including sagittal and axial T1-weighted, axial FLAIR,  T2-weighted, diffusion weighted, gradient echo, sagittal T2 FLAIR precontrast.  Axial and coronal postcontrast T1-weighted images. IV CONTRAST:  20 cc Dotarem    INDICATION:  demyelinating disease dizziness, history of stroke and MI    COMPARISON:  CT head of 12/20/2015    FINDINGS:  BRAIN PARENCHYMA:    1. No acute infarct. No mass or abnormal enhancement. 2. Moderately extensive cerebral white matter signal abnormality involving the  periventricular white matter and centrum semiovale probably. Chronic bilateral  thalamic, right basal ganglia, and right pontine lacunar infarcts. These  features favor intracranial small vessel disease. Lesions in the corpus callosum  and white matter adjacent to the fourth ventricle on the right suggests that  there may be underlying demyelinating disease. Foci of T1 hypointensity in the  bifrontal white matter may represent chronic white matter lacunar infarcts or  foci of chronic demyelination. INTRACRANIAL HEMORRHAGE: Chronic microhemorrhage in the right pontine and right  basal ganglia lacunar infarcts. CSF SPACES:  Normal in size and morphology for the patient's age. BASAL CISTERNS:  Patent. MIDLINE SHIFT: None. VASCULAR SYSTEM:  Normal flow voids. PARANASAL SINUSES AND MASTOID AIR CELLS:  No significant opacification. VISUALIZED ORBITS:  No significant abnormalities. Previous left cataract  surgery. VISUALIZED UPPER CERVICAL SPINE:  No significant abnormalities. SELLA:  No enlargement or  focal abnormality. SKULL BASE:  No significant abnormalities. Cerebellar tonsils in normal  position. CALVARIUM:  Intact. Impression IMPRESSION:    1. No acute findings. 2. Moderately extensive cerebral white matter signal abnormality. Overall,  features favor chronic small vessel ischemic changes. Underlying demyelinating  disease may contribute to the abnormalities. IR Results (recent):  No results found for this or any previous visit. VAS/US Results (recent):  No results found for this or any previous visit. PHYSICAL EXAM:  General:    Alert, cooperative, no distress, appears stated age. Head:   Normocephalic, without obvious abnormality, atraumatic. Eyes:   Conjunctivae/corneas clear. PERRLA  Nose:  Nares normal. No drainage or sinus tenderness. Throat:    Lips, mucosa, and tongue normal.  No Thrush  Neck:  Supple, symmetrical,  no adenopathy, thyroid: non tender    no carotid bruit and no JVD. Back:    Symmetric,  No CVA tenderness. Lungs:   Clear to auscultation bilaterally. No Wheezing or Rhonchi. No rales. Chest wall:  No tenderness or deformity. No Accessory muscle use. Heart:   Regular rate and rhythm,  no murmur, rub or gallop. Abdomen:   Soft, non-tender. Not distended. Bowel sounds normal. No masses  Extremities: Extremities normal, atraumatic, No cyanosis. No edema. No clubbing  Skin:     Texture, turgor normal. No rashes or lesions. Not Jaundiced  Lymph nodes: Cervical, supraclavicular normal.  Psych:  Good insight. Not depressed. Not anxious or agitated. NEUROLOGICAL EXAM:  Appearance: The patient is well developed, well nourished, provides a coherent history and is in no acute distress. Mental Status: Oriented to time, place and person. Mood and affect appropriate. Cranial Nerves:   Intact visual fields. Fundi are benign. ANGELO, EOM's full, no nystagmus, no ptosis. Facial sensation is normal. Corneal reflexes are intact. Facial movement is symmetric. Hearing is normal bilaterally. Palate is midline with normal sternocleidomastoid and trapezius muscles are normal. Tongue is midline. Motor:  5/5 strength in upper and lower proximal and distal muscles. Normal bulk and tone. No fasciculations. Reflexes:   Deep tendon reflexes 2+/4 and symmetrical.   Sensory:   Normal to touch, pinprick and vibration. Gait:  Normal gait. Tremor:   No tremor noted. Cerebellar:  No cerebellar signs present. Neurovascular:  Normal heart sounds and regular rhythm, peripheral pulses intact, and no carotid bruits. Assesment  1. Dizziness    - PROTEIN, CSF  - XR SPINAL PUNC LUMB DX; Future  - CELL COUNT, CSF  - GLUCOSE, CSF  - MS PROFILE+MBP, CSF    2. White matter disease    - PROTEIN, CSF  - XR SPINAL PUNC LUMB DX; Future  - CELL COUNT, CSF  - GLUCOSE, CSF  - MS PROFILE+MBP, CSF    3. [facility]    - PROTEIN, CSF  - XR SPINAL PUNC LUMB DX; Future  - CELL COUNT, CSF  - GLUCOSE, CSF  - MS PROFILE+MBP, CSF    4. Paresthesia    - PROTEIN, CSF  - XR SPINAL PUNC LUMB DX; Future  - CELL COUNT, CSF  - GLUCOSE, CSF  - MS PROFILE+MBP, CSF    ___________________________________________________  PLAN: Medication and plan discussed with patient. ICD-10-CM ICD-9-CM    1. Dizziness R42 780.4 PROTEIN, CSF      XR SPINAL PUNC LUMB DX      CELL COUNT, CSF      GLUCOSE, CSF      MS PROFILE+MBP, CSF   2. White matter disease R90.82 348.89 PROTEIN, CSF      XR SPINAL PUNC LUMB DX      CELL COUNT, CSF      GLUCOSE, CSF      MS PROFILE+MBP, CSF   3. Demyelinating disease ([facility] G37.9 341.9 PROTEIN, CSF      XR SPINAL PUNC LUMB DX      CELL COUNT, CSF      GLUCOSE, CSF      MS PROFILE+MBP, CSF   4.  Paresthesia R20.2 782.0 PROTEIN, CSF      XR SPINAL PUNC LUMB DX      CELL COUNT, CSF GLUCOSE, CSF      MS PROFILE+MBP, CSF     Follow-up and Dispositions    · Return in about 6 weeks (around 2/11/2020) for Test Results.              ___________________________________________________    Attending Physician: Shorty Doe MD

## 2020-12-17 ENCOUNTER — TELEPHONE (OUTPATIENT)
Dept: NEUROLOGY | Age: 65
End: 2020-12-17

## 2021-01-25 ENCOUNTER — OFFICE VISIT (OUTPATIENT)
Dept: NEUROLOGY | Age: 66
End: 2021-01-25
Payer: COMMERCIAL

## 2021-01-25 ENCOUNTER — TELEPHONE (OUTPATIENT)
Dept: NEUROLOGY | Age: 66
End: 2021-01-25

## 2021-01-25 VITALS
BODY MASS INDEX: 31.01 KG/M2 | HEIGHT: 69 IN | OXYGEN SATURATION: 99 % | HEART RATE: 79 BPM | SYSTOLIC BLOOD PRESSURE: 118 MMHG | RESPIRATION RATE: 18 BRPM | WEIGHT: 209.4 LBS | DIASTOLIC BLOOD PRESSURE: 66 MMHG | TEMPERATURE: 97.5 F

## 2021-01-25 DIAGNOSIS — E55.9 VITAMIN D DEFICIENCY: ICD-10-CM

## 2021-01-25 DIAGNOSIS — G43.109 MIGRAINE WITH VERTIGO: ICD-10-CM

## 2021-01-25 DIAGNOSIS — R90.82 WHITE MATTER DISEASE: ICD-10-CM

## 2021-01-25 DIAGNOSIS — R42 DIZZINESS: Primary | ICD-10-CM

## 2021-01-25 DIAGNOSIS — R26.9 GAIT DISORDER: ICD-10-CM

## 2021-01-25 PROCEDURE — 99214 OFFICE O/P EST MOD 30 MIN: CPT | Performed by: PSYCHIATRY & NEUROLOGY

## 2021-01-25 RX ORDER — THERA TABS 400 MCG
1 TAB ORAL DAILY
COMMUNITY
End: 2021-06-22

## 2021-01-25 RX ORDER — ERGOCALCIFEROL 1.25 MG/1
50000 CAPSULE ORAL
Qty: 8 CAP | Refills: 3 | Status: SHIPPED | OUTPATIENT
Start: 2021-01-25 | End: 2021-06-22

## 2021-01-25 RX ORDER — ROSUVASTATIN CALCIUM 10 MG/1
10 TABLET, COATED ORAL
COMMUNITY

## 2021-01-25 RX ORDER — LAMOTRIGINE 25 MG/1
25 TABLET ORAL
Qty: 30 TAB | Refills: 3 | Status: SHIPPED | OUTPATIENT
Start: 2021-01-25 | End: 2021-05-23

## 2021-01-25 RX ORDER — MECLIZINE HYDROCHLORIDE 25 MG/1
25 TABLET ORAL
COMMUNITY
End: 2021-06-22

## 2021-01-25 NOTE — PROGRESS NOTES
Neurology Progress Note    NAME:  Gayle Tran   :   1955   MRN:   [id number]     Date/Time:  2021  Subjective:   Gayle Tran is a 60 y.o. male here today for dizziness, headaches, vertigo,   Patient says he still experiencing the dizziness and vertigo, but not as frequent, has not changed much since the last visit . Headache frequency is variable, a lot of time, associated with the vertigo. Headache is throbbing in nature mostly frontal associated with dizziness, blurry vision, occasional double vision, had visual obscuration. When the headache is stable vertigo, he experiences nausea, rarely vomiting. Patient says he stays tired. He experiences numbness and tingling sensation   He denies loss of consciousness, dysphagia, or odynophagia  He was scheduled for spinal tap to evaluate for demyelinating disease, however, for some reason spinal tap was not done. At this time, I will hold off on the spinal tap until next visit. I will start patient on Lamictal 25 mg p.o. nightly and see how patient does with vertigo and dizziness, also with headaches. I will reduce the D2 50,000 units to q. weekly. .  Review of Systems - General ROS: positive for  - fatigue and sleep disturbance  Psychological ROS: positive for - anxiety, depression and sleep disturbances  Ophthalmic ROS: positive for - decreased vision  ENT ROS: positive for - headaches, tinnitus, vertigo and visual changes  Allergy and Immunology ROS: negative  Hematological and Lymphatic ROS: negative  Endocrine ROS: negative  Respiratory ROS: no cough, shortness of breath, or wheezing  Cardiovascular ROS: no chest pain or dyspnea on exertion  Gastrointestinal ROS: no abdominal pain, change in bowel habits, or black or bloody stools  Genito-Urinary ROS: no dysuria, trouble voiding, or hematuria  Musculoskeletal ROS: positive for - gait disturbance, joint pain, joint swelling, muscle pain and muscular weakness  Neurological ROS: positive for - dizziness, headaches, impaired coordination/balance, numbness/tingling, visual changes and weakness  Dermatological ROS: negative          Medications reviewed:  Current Outpatient Medications   Medication Sig Dispense Refill    meclizine (ANTIVERT) 25 mg tablet Take 25 mg by mouth daily as needed for Dizziness.  therapeutic multivitamin (THERAGRAN) tablet Take 1 Tab by mouth daily.  rosuvastatin (CRESTOR) 10 mg tablet Take 10 mg by mouth nightly.  predniSONE (DELTASONE) 20 mg tablet Take 20 mg by mouth two (2) times a day. 40 Tab 1    amLODIPine (NORVASC) 2.5 mg tablet TK 1 T PO D  2    aspirin delayed-release 81 mg tablet TK 1 T PO D  3    carvedilol (COREG) 3.125 mg tablet Take  by mouth two (2) times daily (with meals).  clopidogrel (PLAVIX) 75 mg tab Take  by mouth.  lisinopril (PRINIVIL, ZESTRIL) 5 mg tablet Take  by mouth daily.  nitroglycerin (NITROSTAT) 0.4 mg SL tablet 0.4 mg by SubLINGual route every five (5) minutes as needed for Chest Pain. Up to 3 doses.  predniSONE (DELTASONE) 20 mg tablet Take 20 mg by mouth two (2) times a day. 20 Tab 0    pantoprazole (PROTONIX) 40 mg tablet Take 1 Tab by mouth Daily (before breakfast). 30 Tab 1    ergocalciferol (ERGOCALCIFEROL) 50,000 unit capsule Take 1 Cap by mouth every Tuesday and Friday. 8 Cap 3    atorvastatin (LIPITOR) 80 mg tablet Take 80 mg by mouth daily.  ferrous sulfate 325 mg (65 mg iron) cpER Take  by mouth three (3) times daily.  isosorbide mononitrate (ISMO, MONOKET) 20 mg tablet Take  by mouth two (2) times a day.  scopolamine (TRANSDERM-SCOP) 1 mg over 3 days pt3d 1 Patch by TransDERmal route every seventy-two (72) hours.  10 Patch 1        Objective:   Vitals:  Vitals:    01/25/21 0902 01/25/21 0909   BP: 120/70 118/66   Pulse: 79    Resp: 18    Temp: 97.5 °F (36.4 °C)    SpO2: 99%    Weight: 209 lb 6.4 oz (95 kg)    Height: 5' 9\" (1.753 m)    PainSc:   0 - No pain          Lab Data Reviewed:  Lab Results   Component Value Date/Time    WBC 5.6 02/21/2017 07:29 AM    HCT 35.2 (L) 02/21/2017 07:29 AM    HGB 10.9 (L) 02/21/2017 07:29 AM    PLATELET 844 76/86/9594 07:29 AM       Lab Results   Component Value Date/Time    Sodium 140 02/21/2017 07:29 AM    Potassium 3.6 02/21/2017 07:29 AM    Chloride 105 02/21/2017 07:29 AM    CO2 27 02/21/2017 07:29 AM    Glucose 113 (H) 02/21/2017 07:29 AM    BUN 12 02/21/2017 07:29 AM    Creatinine 1.20 02/21/2017 07:29 AM    Calcium 8.4 (L) 02/21/2017 07:29 AM       No components found for: TROPQUANT    No results found for: TIFF      No results found for: HBA1C, HGBE8, UEV4LFHB, LSW6IGHX     Lab Results   Component Value Date/Time    Vitamin B12 504 10/30/2019 08:48 AM       No results found for: TIFF, ANARX, ANAIGG, XBANA    No results found for: CHOL, CHOLPOCT, CHOLX, CHLST, CHOLV, HDL, HDLPOC, HDLP, LDL, LDLCPOC, LDLC, DLDLP, VLDLC, VLDL, TGLX, TRIGL, TRIGP, TGLPOCT, CHHD, CHHDX      CT Results (recent):  Results from Hospital Encounter encounter on 02/21/17   CTA CHEST W WO CONT    Narrative EXAM:  CTA CHEST W WO CONT    INDICATION:  Dizziness and chest tightness this morning when waking up. Shortness of breath. COMPARISON: CT chest angiography 11/30/2010. Chest radiograph 2/21/2017. TECHNIQUE: Helical thin section chest CT following uneventful intravenous  administration of nonionic contrast 85 mL of Isovue-370 according to  departmental PE protocol. Coronal and sagittal reformats were performed. 3D/MIP  post processing was performed. CT dose reduction was achieved through use of a  standardized protocol tailored for this examination and automatic exposure  control for dose modulation. Adaptive statistical iterative reconstruction  (ASIR) was utilized. FINDINGS: This is a good quality study for the evaluation of pulmonary embolism  to the first subsegmental arterial level. There is no pulmonary embolism to this  level.      The aorta and main pulmonary artery are normal in caliber. Cardiac size is  within normal limits. No pericardial effusion. No lymphadenopathy by imaging  size criteria. 2 mm and 3 mm lung nodules the right middle lobe and right lower lobe are stable  and benign. There is no new lung nodule, mass, or consolidation. No pleural  effusion or pneumothorax. Central airways are unremarkable. There is a small hiatal hernia. There is no acute abnormality in the visualized  upper abdomen. There is no aggressive blastic or lytic bony lesion. Impression IMPRESSION:   There is no acute pulmonary embolism or other acute abnormality in the chest.         MRI Results (recent):  Results from Hospital Encounter encounter on 11/12/19   MRI BRAIN W WO CONT    Narrative EXAM: MRI BRAIN W WO CONT    TECHNIQUE: Brain images including sagittal and axial T1-weighted, axial FLAIR,  T2-weighted, diffusion weighted, gradient echo, sagittal T2 FLAIR precontrast.  Axial and coronal postcontrast T1-weighted images. IV CONTRAST:  20 cc Dotarem    INDICATION:  demyelinating disease dizziness, history of stroke and MI    COMPARISON:  CT head of 12/20/2015    FINDINGS:  BRAIN PARENCHYMA:    1. No acute infarct. No mass or abnormal enhancement. 2. Moderately extensive cerebral white matter signal abnormality involving the  periventricular white matter and centrum semiovale probably. Chronic bilateral  thalamic, right basal ganglia, and right pontine lacunar infarcts. These  features favor intracranial small vessel disease. Lesions in the corpus callosum  and white matter adjacent to the fourth ventricle on the right suggests that  there may be underlying demyelinating disease. Foci of T1 hypointensity in the  bifrontal white matter may represent chronic white matter lacunar infarcts or  foci of chronic demyelination. INTRACRANIAL HEMORRHAGE: Chronic microhemorrhage in the right pontine and right  basal ganglia lacunar infarcts.    CSF SPACES:  Normal in size and morphology for the patient's age. BASAL CISTERNS:  Patent. MIDLINE SHIFT: None. VASCULAR SYSTEM:  Normal flow voids. PARANASAL SINUSES AND MASTOID AIR CELLS:  No significant opacification. VISUALIZED ORBITS:  No significant abnormalities. Previous left cataract  surgery. VISUALIZED UPPER CERVICAL SPINE:  No significant abnormalities. SELLA:  No enlargement or  focal abnormality. SKULL BASE:  No significant abnormalities. Cerebellar tonsils in normal  position. CALVARIUM:  Intact. Impression IMPRESSION:    1. No acute findings. 2. Moderately extensive cerebral white matter signal abnormality. Overall,  features favor chronic small vessel ischemic changes. Underlying demyelinating  disease may contribute to the abnormalities. IR Results (recent):  No results found for this or any previous visit. PHYSICAL EXAM:  General:    Alert, cooperative, no distress, appears stated age. Head:   Normocephalic, without obvious abnormality, atraumatic. Eyes:   Conjunctivae/corneas clear. PERRLA  Nose:  Nares normal. No drainage or sinus tenderness. Throat:    Lips, mucosa, and tongue normal.  No Thrush  Neck:  Supple, symmetrical,  no adenopathy, thyroid: non tender    no carotid bruit and no JVD. Back:    Symmetric,  No CVA tenderness. Lungs:   Clear to auscultation bilaterally. No Wheezing or Rhonchi. No rales. Chest wall:  No tenderness or deformity. No Accessory muscle use. Heart:   Regular rate and rhythm,  no murmur, rub or gallop. Abdomen:   Soft, non-tender. Not distended. Bowel sounds normal. No masses  Extremities: Extremities normal, atraumatic, No cyanosis. No edema. No clubbing  Skin:     Texture, turgor normal. No rashes or lesions. Not Jaundiced  Lymph nodes: Cervical, supraclavicular normal.  Psych:  Good insight. Not depressed. Not anxious or agitated. NEUROLOGICAL EXAM:  Appearance:   The patient is well developed, well nourished, provides a coherent history and is in no acute distress. Mental Status: Oriented to time, place and person. Mood and affect appropriate. Cranial Nerves:   Intact visual fields. Fundi are benign. ANGELO, EOM's full, no nystagmus, no ptosis. Facial sensation is normal. Corneal reflexes are intact. Facial movement is symmetric. Hearing is normal bilaterally. Palate is midline with normal sternocleidomastoid and trapezius muscles are normal. Tongue is midline. Motor:  5/5 strength in upper and lower proximal and distal muscles. Normal bulk and tone. No fasciculations. Reflexes:   Deep tendon reflexes 2+/4 and symmetrical.   Sensory:   Normal to touch, pinprick and vibration. Gait:  Normal gait. Tremor:   No tremor noted. Cerebellar:  No cerebellar signs present. Neurovascular:  Normal heart sounds and regular rhythm, peripheral pulses intact, and no carotid bruits. VAS/US Results (recent):  No results found for this or any previous visit. Assesment  1. Dizziness  Continue management  Lamictal 25 mg p.o. nightly    2. White matter disease  We will monitor    3. Migraine with vertigo  Lamictal 25 mg p.o. nightly    4. Gait disorder  Physical therapy    5. Vitamin D deficiency  Vitamin D replacement    ___________________________________________________  PLAN: Medication and plan discussed with patient      ICD-10-CM ICD-9-CM    1. Dizziness  R42 780.4    2. White matter disease  R90.82 348.89    3. Migraine with vertigo  G43.109 346.80      780.4    4. Gait disorder  R26.9 781.2    5.  Vitamin D deficiency  E55.9 268.9              ___________________________________________________    Attending Physician: Melinda Kenny MD

## 2021-05-23 RX ORDER — LAMOTRIGINE 25 MG/1
TABLET ORAL
Qty: 30 TABLET | Refills: 3 | Status: SHIPPED | OUTPATIENT
Start: 2021-05-23 | End: 2021-10-29

## 2021-06-15 ENCOUNTER — TRANSCRIBE ORDER (OUTPATIENT)
Dept: REGISTRATION | Age: 66
End: 2021-06-15

## 2021-06-15 ENCOUNTER — HOSPITAL ENCOUNTER (OUTPATIENT)
Dept: GENERAL RADIOLOGY | Age: 66
Discharge: HOME | End: 2021-06-15
Payer: MEDICARE

## 2021-06-15 DIAGNOSIS — R07.9 CHEST PAIN: Primary | ICD-10-CM

## 2021-06-15 DIAGNOSIS — R07.9 CHEST PAIN: ICD-10-CM

## 2021-06-15 PROCEDURE — 71046 X-RAY EXAM CHEST 2 VIEWS: CPT

## 2021-06-22 RX ORDER — AMLODIPINE BESYLATE 5 MG/1
5 TABLET ORAL DAILY
COMMUNITY

## 2021-06-22 RX ORDER — METFORMIN HYDROCHLORIDE 500 MG/1
500 TABLET ORAL
COMMUNITY

## 2021-06-22 RX ORDER — CETIRIZINE HCL 10 MG
10 TABLET ORAL DAILY
COMMUNITY

## 2021-06-22 RX ORDER — GUAIFENESIN 600 MG/1
600 TABLET, EXTENDED RELEASE ORAL 2 TIMES DAILY
COMMUNITY

## 2021-07-01 ENCOUNTER — ANESTHESIA (OUTPATIENT)
Dept: ENDOSCOPY | Age: 66
End: 2021-07-01
Payer: MEDICARE

## 2021-07-01 ENCOUNTER — HOSPITAL ENCOUNTER (OUTPATIENT)
Age: 66
Setting detail: OUTPATIENT SURGERY
Discharge: HOME | End: 2021-07-01
Attending: INTERNAL MEDICINE | Admitting: INTERNAL MEDICINE
Payer: MEDICARE

## 2021-07-01 ENCOUNTER — ANESTHESIA EVENT (OUTPATIENT)
Dept: ENDOSCOPY | Age: 66
End: 2021-07-01
Payer: MEDICARE

## 2021-07-01 VITALS
HEART RATE: 64 BPM | OXYGEN SATURATION: 96 % | HEIGHT: 67 IN | BODY MASS INDEX: 31.55 KG/M2 | RESPIRATION RATE: 14 BRPM | SYSTOLIC BLOOD PRESSURE: 123 MMHG | DIASTOLIC BLOOD PRESSURE: 83 MMHG | TEMPERATURE: 99.6 F | WEIGHT: 201 LBS

## 2021-07-01 PROCEDURE — 74011250636 HC RX REV CODE- 250/636: Performed by: NURSE ANESTHETIST, CERTIFIED REGISTERED

## 2021-07-01 PROCEDURE — 76040000019: Performed by: INTERNAL MEDICINE

## 2021-07-01 PROCEDURE — 2709999900 HC NON-CHARGEABLE SUPPLY: Performed by: INTERNAL MEDICINE

## 2021-07-01 PROCEDURE — 76060000031 HC ANESTHESIA FIRST 0.5 HR: Performed by: INTERNAL MEDICINE

## 2021-07-01 PROCEDURE — 74011250637 HC RX REV CODE- 250/637: Performed by: INTERNAL MEDICINE

## 2021-07-01 PROCEDURE — 74011000250 HC RX REV CODE- 250: Performed by: NURSE ANESTHETIST, CERTIFIED REGISTERED

## 2021-07-01 RX ORDER — SODIUM CHLORIDE 9 MG/ML
50 INJECTION, SOLUTION INTRAVENOUS CONTINUOUS
Status: DISCONTINUED | OUTPATIENT
Start: 2021-07-01 | End: 2021-07-01 | Stop reason: HOSPADM

## 2021-07-01 RX ORDER — PROPOFOL 10 MG/ML
INJECTION, EMULSION INTRAVENOUS AS NEEDED
Status: DISCONTINUED | OUTPATIENT
Start: 2021-07-01 | End: 2021-07-01 | Stop reason: HOSPADM

## 2021-07-01 RX ORDER — FLUMAZENIL 0.1 MG/ML
0.2 INJECTION INTRAVENOUS
Status: DISCONTINUED | OUTPATIENT
Start: 2021-07-01 | End: 2021-07-01 | Stop reason: HOSPADM

## 2021-07-01 RX ORDER — EPINEPHRINE 0.1 MG/ML
1 INJECTION INTRACARDIAC; INTRAVENOUS
Status: DISCONTINUED | OUTPATIENT
Start: 2021-07-01 | End: 2021-07-01 | Stop reason: HOSPADM

## 2021-07-01 RX ORDER — FENTANYL CITRATE 50 UG/ML
25-200 INJECTION, SOLUTION INTRAMUSCULAR; INTRAVENOUS
Status: DISCONTINUED | OUTPATIENT
Start: 2021-07-01 | End: 2021-07-01 | Stop reason: HOSPADM

## 2021-07-01 RX ORDER — DEXTROMETHORPHAN/PSEUDOEPHED 2.5-7.5/.8
1.2 DROPS ORAL
Status: DISCONTINUED | OUTPATIENT
Start: 2021-07-01 | End: 2021-07-01 | Stop reason: HOSPADM

## 2021-07-01 RX ORDER — SODIUM CHLORIDE 9 MG/ML
INJECTION, SOLUTION INTRAVENOUS
Status: DISCONTINUED | OUTPATIENT
Start: 2021-07-01 | End: 2021-07-01 | Stop reason: HOSPADM

## 2021-07-01 RX ORDER — MIDAZOLAM HYDROCHLORIDE 1 MG/ML
.25-5 INJECTION, SOLUTION INTRAMUSCULAR; INTRAVENOUS
Status: DISCONTINUED | OUTPATIENT
Start: 2021-07-01 | End: 2021-07-01 | Stop reason: HOSPADM

## 2021-07-01 RX ORDER — NALOXONE HYDROCHLORIDE 0.4 MG/ML
0.4 INJECTION, SOLUTION INTRAMUSCULAR; INTRAVENOUS; SUBCUTANEOUS
Status: DISCONTINUED | OUTPATIENT
Start: 2021-07-01 | End: 2021-07-01 | Stop reason: HOSPADM

## 2021-07-01 RX ORDER — SODIUM CHLORIDE 0.9 % (FLUSH) 0.9 %
5-40 SYRINGE (ML) INJECTION AS NEEDED
Status: DISCONTINUED | OUTPATIENT
Start: 2021-07-01 | End: 2021-07-01 | Stop reason: HOSPADM

## 2021-07-01 RX ORDER — LIDOCAINE HYDROCHLORIDE 20 MG/ML
INJECTION, SOLUTION EPIDURAL; INFILTRATION; INTRACAUDAL; PERINEURAL AS NEEDED
Status: DISCONTINUED | OUTPATIENT
Start: 2021-07-01 | End: 2021-07-01 | Stop reason: HOSPADM

## 2021-07-01 RX ORDER — ATROPINE SULFATE 0.1 MG/ML
0.5 INJECTION INTRAVENOUS
Status: DISCONTINUED | OUTPATIENT
Start: 2021-07-01 | End: 2021-07-01 | Stop reason: HOSPADM

## 2021-07-01 RX ORDER — SODIUM CHLORIDE 0.9 % (FLUSH) 0.9 %
5-40 SYRINGE (ML) INJECTION EVERY 8 HOURS
Status: DISCONTINUED | OUTPATIENT
Start: 2021-07-01 | End: 2021-07-01 | Stop reason: HOSPADM

## 2021-07-01 RX ADMIN — PROPOFOL 25 MG: 10 INJECTION, EMULSION INTRAVENOUS at 16:03

## 2021-07-01 RX ADMIN — PROPOFOL 30 MG: 10 INJECTION, EMULSION INTRAVENOUS at 15:57

## 2021-07-01 RX ADMIN — SODIUM CHLORIDE: 900 INJECTION, SOLUTION INTRAVENOUS at 15:50

## 2021-07-01 RX ADMIN — PROPOFOL 70 MG: 10 INJECTION, EMULSION INTRAVENOUS at 15:55

## 2021-07-01 RX ADMIN — PROPOFOL 25 MG: 10 INJECTION, EMULSION INTRAVENOUS at 15:59

## 2021-07-01 RX ADMIN — LIDOCAINE HYDROCHLORIDE 100 MG: 20 INJECTION, SOLUTION EPIDURAL; INFILTRATION; INTRACAUDAL; PERINEURAL at 15:55

## 2021-07-01 RX ADMIN — PROPOFOL 25 MG: 10 INJECTION, EMULSION INTRAVENOUS at 16:01

## 2021-07-01 RX ADMIN — PROPOFOL 25 MG: 10 INJECTION, EMULSION INTRAVENOUS at 16:05

## 2021-07-01 NOTE — DISCHARGE INSTRUCTIONS
[address], [address] [place] [address]    EGD and COLON DISCHARGE INSTRUCTIONS    Michael Turner  [id number]  1955    Discomfort:  Redness at IV site- apply warm compress to area; if redness or soreness persist- contact your physician  There may be a slight amount of blood passed from the rectum  Gaseous discomfort- walking, belching will help relieve any discomfort  You may not operate a vehicle for 12 hours  You may not engage in an occupation involving machinery or appliances for rest of today  You may not drink alcoholic beverages for at least 12 hours  Avoid making any critical decisions for at least 24 hour  DIET:  You may resume your regular diet - however -  remember your colon is empty and a heavy meal will produce gas. Avoid these foods:  vegetables, fried / greasy foods, carbonated drinks     ACTIVITY:  You may  resume your normal daily activities it is recommended that you spend the remainder of the day resting -  avoid any strenuous activity. CALL M.D.   ANY SIGN OF:   Increasing pain, nausea, vomiting  Abdominal distension (swelling)  New increased bleeding (oral or rectal)  Fever (chills)  Pain in chest area  Bloody discharge from nose or mouth  Shortness of breath      Follow-up Instructions:   Call Dr. Luma Earl for any questions or problems at [phone] and follow up with him in 3 months   High fiber diet   Will order ultrasound of abdomen to complete the GI work up of your abdominal pain          ENDOSCOPY FINDINGS:   Your colonoscopy showed small internal hemorrhoids and diverticulosis, rest of exam was normal   Your endoscopy showed small hiatal hernia, rest of exam was normal.  Telephone # [phone]      Signed By: Luma Earl MD     7/1/2021  4:21 PM       DISCHARGE SUMMARY from Nurse    The following personal items collected during your admission are returned to you:   Dental Appliance: Dental Appliances: None  Vision: Visual Aid: Glasses  Hearing Aid:    Jewelry:    Clothing:    Other Valuables:    Valuables sent to safe:        Learning About Coronavirus (COVID-19)  Coronavirus (COVID-19): Overview  What is coronavirus (COVID-19)? The coronavirus disease (COVID-19) is caused by a virus. It is an illness that was first found in [place], [place], in December 2019. It has since spread worldwide. The virus can cause fever, cough, and trouble breathing. In severe cases, it can cause pneumonia and make it hard to breathe without help. It can cause death. Coronaviruses are a large group of viruses. They cause the common cold. They also cause more serious illnesses like Middle East respiratory syndrome (MERS) and severe acute respiratory syndrome (SARS). COVID-19 is caused by a novel coronavirus. That means it's a new type that has not been seen in people before. This virus spreads person-to-person through droplets from coughing and sneezing. It can also spread when you are close to someone who is infected. And it can spread when you touch something that has the virus on it, such as a doorknob or a tabletop. What can you do to protect yourself from coronavirus (COVID-19)? The best way to protect yourself from getting sick is to:  · Avoid areas where there is an outbreak. · Avoid contact with people who may be infected. · Wash your hands often with soap or alcohol-based hand sanitizers. · Avoid crowds and try to stay at least 6 feet away from other people. · Wash your hands often, especially after you cough or sneeze. Use soap and water, and scrub for at least 20 seconds. If soap and water aren't available, use an alcohol-based hand . · Avoid touching your mouth, nose, and eyes. What can you do to avoid spreading the virus to others? To help avoid spreading the virus to others:  · Cover your mouth with a tissue when you cough or sneeze. Then throw the tissue in the trash.   · Use a disinfectant to clean things that you touch often.  · Stay home if you are sick or have been exposed to the virus. Don't go to school, work, or public areas. And don't use public transportation. · If you are sick:  ? Leave your home only if you need to get medical care. But call the doctor's office first so they know you're coming. And wear a face mask, if you have one.  ? If you have a face mask, wear it whenever you're around other people. It can help stop the spread of the virus when you cough or sneeze. ? Clean and disinfect your home every day. Use household  and disinfectant wipes or sprays. Take special care to clean things that you grab with your hands. These include doorknobs, remote controls, phones, and handles on your refrigerator and microwave. And don't forget countertops, tabletops, bathrooms, and computer keyboards. When to call for help  Call 911 anytime you think you may need emergency care. For example, call if:  · You have severe trouble breathing. (You can't talk at all.)  · You have constant chest pain or pressure. · You are severely dizzy or lightheaded. · You are confused or can't think clearly. · Your face and lips have a blue color. · You pass out (lose consciousness) or are very hard to wake up. Call your doctor now if you develop symptoms such as:  · Shortness of breath. · Fever. · Cough. If you need to get care, call ahead to the doctor's office for instructions before you go. Make sure you wear a face mask, if you have one, to prevent exposing other people to the virus. Where can you get the latest information? The following health organizations are tracking and studying this virus. Their websites contain the most up-to-date information. Jd Martin also learn what to do if you think you may have been exposed to the virus. · U.S. Centers for Disease Control and Prevention (CDC): The CDC provides updated news about the disease and travel advice. The website also tells you how to prevent the spread of infection. www.cdc.gov  · World Health Organization [facility]): WHO offers information about the virus outbreaks. WHO also has travel advice. www.who.int  Current as of: April 1, 2020               Content Version: 12.4  © 9282-7531 [employer]orporated. Care instructions adapted under license by your healthcare professional. If you have questions about a medical condition or this instruction, always ask your healthcare professional. [address] any warranty or liability for your use of this information.

## 2021-07-01 NOTE — PROCEDURES
[address] [facility], [facility] (EGD) Procedure Note    Michael Turner  1955  [id number]      Procedure: Endoscopic Gastroduodenoscopy --diagnostic    Indication:  Abdominal pain, epigastric, Abdominal pain, RUQ     Pre-operative Diagnosis: see indication above    Post-operative Diagnosis: see findings below    : Luma Earl MD    Surgical Assistant: Endoscopy Technician-1: Nya Huang  Endoscopy RN-1: Anant Vela    Implants:  None    Referring Provider:  Sakina Vivas NP      Anesthesia/Sedation:  MAC anesthesia Propofol        Procedure Details     After infomed consent was obtained for the procedure, with all risks and benefits of procedure explained the patient was taken to the endoscopy suite and placed in the left lateral decubitus position. Following sequential administration of sedation as per above, the endoscope was inserted into the mouth and advanced under direct vision to third portion of the duodenum. A careful inspection was made as the gastroscope was withdrawn, including a retroflexed view of the proximal stomach; findings and interventions are described below. Findings:   Esophagus:hiatal hernia 3 cm in size   Stomach: normal   Duodenum/jejunum: normal      Therapies:  none    Specimens: none         EBL: None      Complications:   None; patient tolerated the procedure well. Impression:    -See post-procedure diagnoses.     Recommendations:  -Continue acid suppression.  -colonoscopy today  -will order US of abdomen to jayce for gallstones    Signed By: Luma Earl MD     7/1/2021  4:17 PM

## 2021-07-01 NOTE — ANESTHESIA PREPROCEDURE EVALUATION
Relevant Problems   No relevant active problems       Anesthetic History               Review of Systems / Medical History  Patient summary reviewed, nursing notes reviewed and pertinent labs reviewed    Pulmonary        Sleep apnea           Neuro/Psych       CVA  TIA     Cardiovascular    Hypertension: well controlled          CAD and cardiac stents    Exercise tolerance: >4 METS     GI/Hepatic/Renal     GERD           Endo/Other    Diabetes         Other Findings              Physical Exam    Airway  Mallampati: I  TM Distance: > 6 cm  Neck ROM: normal range of motion   Mouth opening: Normal     Cardiovascular    Rhythm: regular  Rate: normal         Dental  No notable dental hx       Pulmonary  Breath sounds clear to auscultation               Abdominal         Other Findings            Anesthetic Plan    ASA: 3  Anesthesia type: MAC          Induction: Intravenous  Anesthetic plan and risks discussed with: Patient

## 2021-07-01 NOTE — PERIOP NOTES

## 2021-07-01 NOTE — ROUTINE PROCESS
Kayla Zuritag  1955  [id number]    Situation:  Verbal report received from: Lilly  Procedure: Procedure(s):  COLONOSCOPY AND EGD  ESOPHAGOGASTRODUODENOSCOPY (EGD)    Background:    Preoperative diagnosis: Rectal bleeding [K62.5]  Gastroesophageal reflux disease, unspecified whether esophagitis present [K21.9]  Abdominal pain, unspecified abdominal location [R10.9]  Postoperative diagnosis: 1. Hiatal Hernia  2. Diverticulosis  3. Internal Hemorrhoids    :  Dr. Sharron Valdovinos  Assistant(s): Endoscopy Technician-1: Martina Duvall  Endoscopy RN-1: Vargas Beltran    Specimens: no  H.  Pylori  no    Assessment:  Intra-procedure medications     Anesthesia gave intra-procedure sedation and medications, see anesthesia flow sheet    Intravenous fluids: NS@ KVO     Vital signs stable     Abdominal assessment: round and soft     Recommendation:  Discharge patient per MD order

## 2021-07-01 NOTE — PROCEDURES
[address], [address] [place] [address]      Colonoscopy Operative Report    Álvaro Arias  [id number]  1955      Procedure Type:   Colonoscopy --diagnostic     Indications:    Hematochezia/melena       Pre-operative Diagnosis: see indication above    Post-operative Diagnosis:  See findings below    :  Aaliyah Aguila MD    Surgical Assistant: Endoscopy Technician-1: Ibis Villalobos  Endoscopy RN-1: Thea Monk    Implants:  None    Referring Provider: De Hutchins NP      Sedation:  MAC anesthesia Propofol      Procedure Details:  After informed consent was obtained with all risks and benefits of procedure explained and preoperative exam completed, the patient was taken to the endoscopy suite and placed in the left lateral decubitus position. Upon sequential sedation as per above, a digital rectal exam was performed demonstrating internal hemorrhoids. The Olympus videocolonoscope  was inserted in the rectum and carefully advanced to the cecum, which was identified by the ileocecal valve and appendiceal orifice, terminal ileum. The cecum was identified by the ileocecal valve and appendiceal orifice. The quality of preparation was good. The colonoscope was slowly withdrawn with careful evaluation between folds. Retroflexion in the rectum was completed . Findings:   Rectum: normal  Small internal hemorrhoids  Sigmoid: moderate diverticulosis  Descending Colon: normal  Transverse Colon: normal  Ascending Colon: normal  Cecum: normal  Terminal Ileum: normal      Specimen Removed:  none    Complications: None. EBL:  None. Impression:    see findings    Recommendations: --Repeat colonoscopy in 5 years.     High fiber diet  F/u in 3 months    Signed By: Aaliyah Aguila MD     7/1/2021  4:19 PM

## 2021-07-01 NOTE — ANESTHESIA POSTPROCEDURE EVALUATION
Procedure(s):  COLONOSCOPY AND EGD  ESOPHAGOGASTRODUODENOSCOPY (EGD). MAC    Anesthesia Post Evaluation        Patient location during evaluation: PACU  Patient participation: complete - patient participated  Level of consciousness: awake and alert  Pain management: adequate  Airway patency: patent  Anesthetic complications: no  Cardiovascular status: acceptable  Respiratory status: acceptable  Hydration status: acceptable  Comments: I have seen and evaluated the patient and is ready for discharge. Darius Rojo MD    Post anesthesia nausea and vomiting:  none      INITIAL Post-op Vital signs:   Vitals Value Taken Time   /83 07/01/21 1625   Temp 37.6 °C (99.6 °F) 07/01/21 1617   Pulse 68 07/01/21 1627   Resp 19 07/01/21 1627   SpO2 97 % 07/01/21 1627   Vitals shown include unvalidated device data.

## 2021-07-01 NOTE — H&P
[address], [address] [place] [address]      History and Physical       NAME:  Satya Momin   :   1955   MRN:   [id number]             History of Present Illness:  Patient is a 61 y.o. who is seen for rectal bleeding. PMH:  Past Medical History:   Diagnosis Date    Arthritis     OSTEO    CAD (coronary artery disease)     2 heart stents/2 stents another time/mild heart attack    Chest pain     Diabetes ([address]     Fainting     Gastrointestinal disorder     acid reflux    GERD (gastroesophageal reflux disease)     History of seasonal allergies     \"HAY FEVER\"    Hypertension     SOB (shortness of breath)     Stroke (HCC)     strokes x 4 - no deficits    Unintentional weight change     Unspecified sleep apnea     USES CPAP/no longer uses CPAP    Vertigo     Visual disturbance        PSH:  Past Surgical History:   Procedure Laterality Date    HX HEART CATHETERIZATION      x2    HX ORTHOPAEDIC      BOOGIE KNEES x 3 (2 on one knee and once on the other knee) times d/t football injury    HX ORTHOPAEDIC       rt thumb nerve release    HX OTHER SURGICAL      left eye implant     HX OTHER SURGICAL      KIDNEY STONES REMOVED    HX TONSIL AND ADENOIDECTOMY      DE ABDOMEN SURGERY PROC UNLISTED      Teena Tejada bladder removed        Allergies: Allergies   Allergen Reactions    Contrast Agent [Iodine] Hives       Home Medications:  Prior to Admission Medications   Prescriptions Last Dose Informant Patient Reported? Taking? amLODIPine (NORVASC) 5 mg tablet   Yes Yes   Sig: Take 5 mg by mouth daily. aspirin delayed-release 81 mg tablet   Yes Yes   Sig: TK 1 T PO D   carvedilol (COREG) 3.125 mg tablet   Yes Yes   Sig: Take 3.125 mg by mouth two (2) times a day. cetirizine (ZYRTEC) 10 mg tablet   Yes Yes   Sig: Take 10 mg by mouth daily. clopidogrel (PLAVIX) 75 mg tab   Yes Yes   Sig: Take 75 mg by mouth daily.    guaiFENesin ER (Mucinex) 600 mg ER tablet Yes Yes   Sig: Take 600 mg by mouth two (2) times a day. lamoTRIgine (LaMICtal) 25 mg tablet   No Yes   Sig: TAKE 1 TABLET BY MOUTH EVERY NIGHT   lisinopril (PRINIVIL, ZESTRIL) 5 mg tablet   Yes Yes   Sig: Take 5 mg by mouth daily. metFORMIN (GLUCOPHAGE) 500 mg tablet   Yes Yes   Sig: Take 500 mg by mouth three (3) times daily (with meals). multivit-min/iron/folic acid/K (ADULTS MULTIVITAMIN PO)   Yes Yes   Sig: Take  by mouth. 50 plus. Takes one po once daily. nitroglycerin (NITROSTAT) 0.4 mg SL tablet   Yes Yes   Si.4 mg by SubLINGual route every five (5) minutes as needed for Chest Pain. Up to 3 doses. pantoprazole (PROTONIX) 40 mg tablet   No Yes   Sig: Take 1 Tab by mouth Daily (before breakfast). rosuvastatin (CRESTOR) 10 mg tablet   Yes Yes   Sig: Take 10 mg by mouth nightly.       Facility-Administered Medications: None       Hospital Medications:  Current Facility-Administered Medications   Medication Dose Route Frequency    0.9% sodium chloride infusion  50 mL/hr IntraVENous CONTINUOUS    sodium chloride (NS) flush 5-40 mL  5-40 mL IntraVENous Q8H    sodium chloride (NS) flush 5-40 mL  5-40 mL IntraVENous PRN    midazolam (VERSED) injection 0.25-5 mg  0.25-5 mg IntraVENous Multiple    fentaNYL citrate (PF) injection  mcg   mcg IntraVENous Multiple    naloxone (NARCAN) injection 0.4 mg  0.4 mg IntraVENous Multiple    flumazeniL (ROMAZICON) 0.1 mg/mL injection 0.2 mg  0.2 mg IntraVENous Multiple    simethicone (MYLICON) 87KY/0.6UB oral drops 80 mg  1.2 mL Oral Multiple    atropine injection 0.5 mg  0.5 mg IntraVENous ONCE PRN    EPINEPHrine (ADRENALIN) 0.1 mg/mL syringe 1 mg  1 mg Endoscopically ONCE PRN       Social History:  Social History     Tobacco Use    Smoking status: Never Smoker    Smokeless tobacco: Never Used   Substance Use Topics    Alcohol use: No       Family History:  Family History   Problem Relation Age of Onset    Diabetes Mother     Cancer Father THROAT    Diabetes Sister     Diabetes Maternal Aunt     Alzheimer Maternal Uncle     Diabetes Maternal Aunt          The patient was counseled at length about the risks of giovanna Covid-19 in the anselmo-operative and post-operative states including the recovery window of their procedure. The patient was made aware that giovanna Covid-19 after a surgical procedure may worsen their prognosis for recovering from the virus and lend to a higher morbidity and or mortality risk. The patient was given the options of postponing their procedure. All of the risks, benefits, and alternatives were discussed. The patient does  wish to proceed with the procedure. Review of Systems:      Constitutional: negative fever, negative chills, negative weight loss  Eyes:   negative visual changes  ENT:   negative sore throat, tongue or lip swelling  Respiratory:  negative cough, negative dyspnea  Cards:  negative for chest pain, palpitations, lower extremity edema  GI:   See HPI  :  negative for frequency, dysuria  Integument:  negative for rash and pruritus  Heme:  negative for easy bruising and gum/nose bleeding  Musculoskel: negative for myalgias,  back pain and muscle weakness  Neuro: negative for headaches, dizziness, vertigo  Psych:  negative for feelings of anxiety, depression       Objective:   No data found. No intake/output data recorded. No intake/output data recorded. EXAM:     NEURO-a&o   HEENT-wnl   LUNGS-clear    COR-regular rate and rhythym     ABD-soft , no tenderness, no rebound, good bs     EXT-no edema     Data Review     No results for input(s): WBC, HGB, HCT, PLT, HGBEXT, HCTEXT, PLTEXT in the last 72 hours. No results for input(s): NA, K, CL, CO2, BUN, CREA, GLU, PHOS, CA in the last 72 hours. No results for input(s): AP, TBIL, TP, ALB, GLOB, GGT, AML, LPSE in the last 72 hours.     No lab exists for component: SGOT, GPT, AMYP, HLPSE  No results for input(s): INR, PTP, APTT, INREXT in the last 72 hours.        Assessment:     · Rectal bleeding     Patient Active Problem List   Diagnosis Code    Hypertension I10           Plan:   ·   · Endoscopic procedure with sedation     Signed By: Issac Cardoso MD     7/1/2021  3:29 PM

## 2021-07-06 ENCOUNTER — TRANSCRIBE ORDER (OUTPATIENT)
Dept: SCHEDULING | Age: 66
End: 2021-07-06

## 2021-07-06 DIAGNOSIS — R10.13 ABDOMINAL PAIN, EPIGASTRIC: Primary | ICD-10-CM

## 2021-07-26 ENCOUNTER — HOSPITAL ENCOUNTER (OUTPATIENT)
Dept: ULTRASOUND IMAGING | Age: 66
Discharge: HOME | End: 2021-07-26
Attending: INTERNAL MEDICINE
Payer: MEDICARE

## 2021-07-26 DIAGNOSIS — R10.13 ABDOMINAL PAIN, EPIGASTRIC: ICD-10-CM

## 2021-07-26 PROCEDURE — 76700 US EXAM ABDOM COMPLETE: CPT

## 2021-07-29 ENCOUNTER — TRANSCRIBE ORDER (OUTPATIENT)
Dept: REGISTRATION | Age: 66
End: 2021-07-29

## 2021-07-29 ENCOUNTER — HOSPITAL ENCOUNTER (OUTPATIENT)
Dept: GENERAL RADIOLOGY | Age: 66
Discharge: HOME | End: 2021-07-29
Attending: INTERNAL MEDICINE
Payer: MEDICARE

## 2021-07-29 DIAGNOSIS — M54.9 BACK PAIN: ICD-10-CM

## 2021-07-29 DIAGNOSIS — M25.552 LEFT HIP PAIN: Primary | ICD-10-CM

## 2021-07-29 DIAGNOSIS — M25.552 LEFT HIP PAIN: ICD-10-CM

## 2021-07-29 PROCEDURE — 73502 X-RAY EXAM HIP UNI 2-3 VIEWS: CPT

## 2021-07-29 PROCEDURE — 72100 X-RAY EXAM L-S SPINE 2/3 VWS: CPT

## 2021-10-29 RX ORDER — LAMOTRIGINE 25 MG/1
TABLET ORAL
Qty: 180 TABLET | Refills: 4 | Status: SHIPPED | OUTPATIENT
Start: 2021-10-29 | End: 2022-06-08

## 2022-03-13 ENCOUNTER — HOSPITAL ENCOUNTER (OUTPATIENT)
Age: 67
Setting detail: OBSERVATION
Discharge: HOME | End: 2022-03-15
Attending: EMERGENCY MEDICINE | Admitting: STUDENT IN AN ORGANIZED HEALTH CARE EDUCATION/TRAINING PROGRAM
Payer: MEDICARE

## 2022-03-13 DIAGNOSIS — R07.9 CHEST PAIN, UNSPECIFIED TYPE: Primary | ICD-10-CM

## 2022-03-13 LAB
ALBUMIN SERPL-MCNC: 3.7 G/DL (ref 3.5–5)
ALBUMIN/GLOB SERPL: 1 {RATIO} (ref 1.1–2.2)
ALP SERPL-CCNC: 76 U/L (ref 45–117)
ALT SERPL-CCNC: 19 U/L (ref 12–78)
ANION GAP SERPL CALC-SCNC: 3 MMOL/L (ref 5–15)
AST SERPL-CCNC: 11 U/L (ref 15–37)
BASOPHILS # BLD: 0.1 K/UL (ref 0–0.1)
BASOPHILS NFR BLD: 1 % (ref 0–1)
BILIRUB SERPL-MCNC: 0.3 MG/DL (ref 0.2–1)
BUN SERPL-MCNC: 17 MG/DL (ref 6–20)
BUN/CREAT SERPL: 10 (ref 12–20)
CALCIUM SERPL-MCNC: 8.8 MG/DL (ref 8.5–10.1)
CHLORIDE SERPL-SCNC: 108 MMOL/L (ref 97–108)
CO2 SERPL-SCNC: 29 MMOL/L (ref 21–32)
CREAT SERPL-MCNC: 1.63 MG/DL (ref 0.7–1.3)
DIFFERENTIAL METHOD BLD: ABNORMAL
EOSINOPHIL # BLD: 0.1 K/UL (ref 0–0.4)
EOSINOPHIL NFR BLD: 2 % (ref 0–7)
ERYTHROCYTE [DISTWIDTH] IN BLOOD BY AUTOMATED COUNT: 18.4 % (ref 11.5–14.5)
GLOBULIN SER CALC-MCNC: 3.7 G/DL (ref 2–4)
GLUCOSE SERPL-MCNC: 100 MG/DL (ref 65–100)
HCT VFR BLD AUTO: 44.3 % (ref 36.6–50.3)
HGB BLD-MCNC: 13 G/DL (ref 12.1–17)
IMM GRANULOCYTES # BLD AUTO: 0 K/UL (ref 0–0.04)
IMM GRANULOCYTES NFR BLD AUTO: 0 % (ref 0–0.5)
LYMPHOCYTES # BLD: 1.6 K/UL (ref 0.8–3.5)
LYMPHOCYTES NFR BLD: 29 % (ref 12–49)
MCH RBC QN AUTO: 24 PG (ref 26–34)
MCHC RBC AUTO-ENTMCNC: 29.3 G/DL (ref 30–36.5)
MCV RBC AUTO: 81.9 FL (ref 80–99)
MONOCYTES # BLD: 0.4 K/UL (ref 0–1)
MONOCYTES NFR BLD: 8 % (ref 5–13)
NEUTS SEG # BLD: 3.2 K/UL (ref 1.8–8)
NEUTS SEG NFR BLD: 60 % (ref 32–75)
NRBC # BLD: 0 K/UL (ref 0–0.01)
NRBC BLD-RTO: 0 PER 100 WBC
PLATELET # BLD AUTO: 164 K/UL (ref 150–400)
PMV BLD AUTO: 10.8 FL (ref 8.9–12.9)
POTASSIUM SERPL-SCNC: 4.3 MMOL/L (ref 3.5–5.1)
PROT SERPL-MCNC: 7.4 G/DL (ref 6.4–8.2)
RBC # BLD AUTO: 5.41 M/UL (ref 4.1–5.7)
SODIUM SERPL-SCNC: 140 MMOL/L (ref 136–145)
TROPONIN-HIGH SENSITIVITY: 7 NG/L (ref 0–76)
WBC # BLD AUTO: 5.3 K/UL (ref 4.1–11.1)

## 2022-03-13 PROCEDURE — 65660000000 HC RM CCU STEPDOWN

## 2022-03-13 PROCEDURE — 99285 EMERGENCY DEPT VISIT HI MDM: CPT

## 2022-03-13 PROCEDURE — 94761 N-INVAS EAR/PLS OXIMETRY MLT: CPT

## 2022-03-13 PROCEDURE — 96374 THER/PROPH/DIAG INJ IV PUSH: CPT

## 2022-03-13 PROCEDURE — 74011250636 HC RX REV CODE- 250/636: Performed by: EMERGENCY MEDICINE

## 2022-03-13 PROCEDURE — 84484 ASSAY OF TROPONIN QUANT: CPT

## 2022-03-13 PROCEDURE — 80053 COMPREHEN METABOLIC PANEL: CPT

## 2022-03-13 PROCEDURE — 65390000012 HC CONDITION CODE 44 OBSERVATION

## 2022-03-13 PROCEDURE — 74011250636 HC RX REV CODE- 250/636: Performed by: INTERNAL MEDICINE

## 2022-03-13 PROCEDURE — 93005 ELECTROCARDIOGRAM TRACING: CPT

## 2022-03-13 PROCEDURE — 36415 COLL VENOUS BLD VENIPUNCTURE: CPT

## 2022-03-13 PROCEDURE — 85025 COMPLETE CBC W/AUTO DIFF WBC: CPT

## 2022-03-13 PROCEDURE — 74011250637 HC RX REV CODE- 250/637: Performed by: EMERGENCY MEDICINE

## 2022-03-13 RX ORDER — NITROGLYCERIN 0.4 MG/1
0.4 TABLET SUBLINGUAL
Status: DISCONTINUED | OUTPATIENT
Start: 2022-03-13 | End: 2022-03-14 | Stop reason: SDUPTHER

## 2022-03-13 RX ORDER — SODIUM CHLORIDE, SODIUM LACTATE, POTASSIUM CHLORIDE, CALCIUM CHLORIDE 600; 310; 30; 20 MG/100ML; MG/100ML; MG/100ML; MG/100ML
100 INJECTION, SOLUTION INTRAVENOUS CONTINUOUS
Status: DISCONTINUED | OUTPATIENT
Start: 2022-03-13 | End: 2022-03-15 | Stop reason: HOSPADM

## 2022-03-13 RX ORDER — MORPHINE SULFATE 2 MG/ML
2 INJECTION, SOLUTION INTRAMUSCULAR; INTRAVENOUS
Status: DISCONTINUED | OUTPATIENT
Start: 2022-03-13 | End: 2022-03-15 | Stop reason: HOSPADM

## 2022-03-13 RX ORDER — ONDANSETRON 2 MG/ML
4 INJECTION INTRAMUSCULAR; INTRAVENOUS
Status: COMPLETED | OUTPATIENT
Start: 2022-03-13 | End: 2022-03-13

## 2022-03-13 RX ORDER — ACETAMINOPHEN 325 MG/1
650 TABLET ORAL
Status: DISCONTINUED | OUTPATIENT
Start: 2022-03-13 | End: 2022-03-14

## 2022-03-13 RX ADMIN — SODIUM CHLORIDE, POTASSIUM CHLORIDE, SODIUM LACTATE AND CALCIUM CHLORIDE 100 ML/HR: 600; 310; 30; 20 INJECTION, SOLUTION INTRAVENOUS at 23:38

## 2022-03-13 RX ADMIN — ONDANSETRON HYDROCHLORIDE 4 MG: 2 SOLUTION INTRAMUSCULAR; INTRAVENOUS at 23:33

## 2022-03-13 RX ADMIN — NITROGLYCERIN 1 INCH: 20 OINTMENT TOPICAL at 23:32

## 2022-03-14 ENCOUNTER — APPOINTMENT (OUTPATIENT)
Dept: MRI IMAGING | Age: 67
End: 2022-03-14
Attending: INTERNAL MEDICINE
Payer: MEDICARE

## 2022-03-14 ENCOUNTER — APPOINTMENT (OUTPATIENT)
Dept: VASCULAR SURGERY | Age: 67
End: 2022-03-14
Attending: INTERNAL MEDICINE
Payer: MEDICARE

## 2022-03-14 ENCOUNTER — APPOINTMENT (OUTPATIENT)
Dept: CT IMAGING | Age: 67
End: 2022-03-14
Attending: INTERNAL MEDICINE
Payer: MEDICARE

## 2022-03-14 LAB
ALBUMIN SERPL-MCNC: 3.4 G/DL (ref 3.5–5)
ALBUMIN/GLOB SERPL: 1.2 {RATIO} (ref 1.1–2.2)
ALP SERPL-CCNC: 73 U/L (ref 45–117)
ALT SERPL-CCNC: 16 U/L (ref 12–78)
AMPHET UR QL SCN: NEGATIVE
ANION GAP SERPL CALC-SCNC: 5 MMOL/L (ref 5–15)
AST SERPL-CCNC: 11 U/L (ref 15–37)
BARBITURATES UR QL SCN: NEGATIVE
BASOPHILS # BLD: 0.1 K/UL (ref 0–0.1)
BASOPHILS NFR BLD: 1 % (ref 0–1)
BENZODIAZ UR QL: NEGATIVE
BILIRUB SERPL-MCNC: 0.3 MG/DL (ref 0.2–1)
BUN SERPL-MCNC: 15 MG/DL (ref 6–20)
BUN/CREAT SERPL: 11 (ref 12–20)
CALCIUM SERPL-MCNC: 8.7 MG/DL (ref 8.5–10.1)
CANNABINOIDS UR QL SCN: NEGATIVE
CHLORIDE SERPL-SCNC: 109 MMOL/L (ref 97–108)
CHOLEST SERPL-MCNC: 143 MG/DL
CO2 SERPL-SCNC: 25 MMOL/L (ref 21–32)
COCAINE UR QL SCN: NEGATIVE
CREAT SERPL-MCNC: 1.31 MG/DL (ref 0.7–1.3)
D DIMER PPP FEU-MCNC: 0.33 MG/L FEU (ref 0–0.65)
DIFFERENTIAL METHOD BLD: ABNORMAL
DRUG SCRN COMMENT,DRGCM: NORMAL
EOSINOPHIL # BLD: 0.2 K/UL (ref 0–0.4)
EOSINOPHIL NFR BLD: 3 % (ref 0–7)
ERYTHROCYTE [DISTWIDTH] IN BLOOD BY AUTOMATED COUNT: 17.9 % (ref 11.5–14.5)
GLOBULIN SER CALC-MCNC: 2.9 G/DL (ref 2–4)
GLUCOSE BLD STRIP.AUTO-MCNC: 105 MG/DL (ref 65–117)
GLUCOSE BLD STRIP.AUTO-MCNC: 81 MG/DL (ref 65–117)
GLUCOSE BLD STRIP.AUTO-MCNC: 85 MG/DL (ref 65–117)
GLUCOSE BLD STRIP.AUTO-MCNC: 91 MG/DL (ref 65–117)
GLUCOSE SERPL-MCNC: 95 MG/DL (ref 65–100)
HCT VFR BLD AUTO: 42.7 % (ref 36.6–50.3)
HDLC SERPL-MCNC: 37 MG/DL
HDLC SERPL: 3.9 {RATIO} (ref 0–5)
HGB BLD-MCNC: 12.6 G/DL (ref 12.1–17)
IMM GRANULOCYTES # BLD AUTO: 0 K/UL (ref 0–0.04)
IMM GRANULOCYTES NFR BLD AUTO: 0 % (ref 0–0.5)
LDLC SERPL CALC-MCNC: 88.4 MG/DL (ref 0–100)
LIPASE SERPL-CCNC: 505 U/L (ref 73–393)
LYMPHOCYTES # BLD: 1.5 K/UL (ref 0.8–3.5)
LYMPHOCYTES NFR BLD: 31 % (ref 12–49)
MAGNESIUM SERPL-MCNC: 1.8 MG/DL (ref 1.6–2.4)
MCH RBC QN AUTO: 24 PG (ref 26–34)
MCHC RBC AUTO-ENTMCNC: 29.5 G/DL (ref 30–36.5)
MCV RBC AUTO: 81.5 FL (ref 80–99)
METHADONE UR QL: NEGATIVE
MONOCYTES # BLD: 0.4 K/UL (ref 0–1)
MONOCYTES NFR BLD: 9 % (ref 5–13)
NEUTS SEG # BLD: 2.8 K/UL (ref 1.8–8)
NEUTS SEG NFR BLD: 56 % (ref 32–75)
NRBC # BLD: 0 K/UL (ref 0–0.01)
NRBC BLD-RTO: 0 PER 100 WBC
OPIATES UR QL: NEGATIVE
PCP UR QL: NEGATIVE
PHOSPHATE SERPL-MCNC: 3.4 MG/DL (ref 2.6–4.7)
PLATELET # BLD AUTO: 138 K/UL (ref 150–400)
PMV BLD AUTO: 11 FL (ref 8.9–12.9)
POTASSIUM SERPL-SCNC: 4.1 MMOL/L (ref 3.5–5.1)
PROT SERPL-MCNC: 6.3 G/DL (ref 6.4–8.2)
RBC # BLD AUTO: 5.24 M/UL (ref 4.1–5.7)
SERVICE CMNT-IMP: NORMAL
SODIUM SERPL-SCNC: 139 MMOL/L (ref 136–145)
TRIGL SERPL-MCNC: 88 MG/DL (ref ?–150)
TROPONIN-HIGH SENSITIVITY: 9 NG/L (ref 0–76)
TSH SERPL DL<=0.05 MIU/L-ACNC: 0.17 UIU/ML (ref 0.36–3.74)
VLDLC SERPL CALC-MCNC: 17.6 MG/DL
WBC # BLD AUTO: 4.9 K/UL (ref 4.1–11.1)

## 2022-03-14 PROCEDURE — 65390000012 HC CONDITION CODE 44 OBSERVATION

## 2022-03-14 PROCEDURE — 83735 ASSAY OF MAGNESIUM: CPT

## 2022-03-14 PROCEDURE — 94760 N-INVAS EAR/PLS OXIMETRY 1: CPT

## 2022-03-14 PROCEDURE — 82962 GLUCOSE BLOOD TEST: CPT

## 2022-03-14 PROCEDURE — 84443 ASSAY THYROID STIM HORMONE: CPT

## 2022-03-14 PROCEDURE — 36415 COLL VENOUS BLD VENIPUNCTURE: CPT

## 2022-03-14 PROCEDURE — 85025 COMPLETE CBC W/AUTO DIFF WBC: CPT

## 2022-03-14 PROCEDURE — G0378 HOSPITAL OBSERVATION PER HR: HCPCS

## 2022-03-14 PROCEDURE — 74011250636 HC RX REV CODE- 250/636: Performed by: INTERNAL MEDICINE

## 2022-03-14 PROCEDURE — 70551 MRI BRAIN STEM W/O DYE: CPT

## 2022-03-14 PROCEDURE — 96372 THER/PROPH/DIAG INJ SC/IM: CPT

## 2022-03-14 PROCEDURE — 84484 ASSAY OF TROPONIN QUANT: CPT

## 2022-03-14 PROCEDURE — 83690 ASSAY OF LIPASE: CPT

## 2022-03-14 PROCEDURE — 84100 ASSAY OF PHOSPHORUS: CPT

## 2022-03-14 PROCEDURE — 94761 N-INVAS EAR/PLS OXIMETRY MLT: CPT

## 2022-03-14 PROCEDURE — 74011250637 HC RX REV CODE- 250/637: Performed by: INTERNAL MEDICINE

## 2022-03-14 PROCEDURE — 80061 LIPID PANEL: CPT

## 2022-03-14 PROCEDURE — 85379 FIBRIN DEGRADATION QUANT: CPT

## 2022-03-14 PROCEDURE — 70544 MR ANGIOGRAPHY HEAD W/O DYE: CPT

## 2022-03-14 PROCEDURE — 74176 CT ABD & PELVIS W/O CONTRAST: CPT

## 2022-03-14 PROCEDURE — 70450 CT HEAD/BRAIN W/O DYE: CPT

## 2022-03-14 PROCEDURE — 74011000250 HC RX REV CODE- 250: Performed by: INTERNAL MEDICINE

## 2022-03-14 PROCEDURE — 80307 DRUG TEST PRSMV CHEM ANLYZR: CPT

## 2022-03-14 PROCEDURE — 80053 COMPREHEN METABOLIC PANEL: CPT

## 2022-03-14 RX ORDER — HEPARIN SODIUM 5000 [USP'U]/ML
5000 INJECTION, SOLUTION INTRAVENOUS; SUBCUTANEOUS EVERY 8 HOURS
Status: DISCONTINUED | OUTPATIENT
Start: 2022-03-14 | End: 2022-03-15 | Stop reason: HOSPADM

## 2022-03-14 RX ORDER — ONDANSETRON 2 MG/ML
4 INJECTION INTRAMUSCULAR; INTRAVENOUS
Status: DISCONTINUED | OUTPATIENT
Start: 2022-03-14 | End: 2022-03-15 | Stop reason: HOSPADM

## 2022-03-14 RX ORDER — ROSUVASTATIN CALCIUM 10 MG/1
10 TABLET, COATED ORAL
Status: DISCONTINUED | OUTPATIENT
Start: 2022-03-14 | End: 2022-03-15 | Stop reason: HOSPADM

## 2022-03-14 RX ORDER — LAMOTRIGINE 25 MG/1
25 TABLET ORAL 2 TIMES DAILY
Status: DISCONTINUED | OUTPATIENT
Start: 2022-03-14 | End: 2022-03-15 | Stop reason: HOSPADM

## 2022-03-14 RX ORDER — CLOPIDOGREL BISULFATE 75 MG/1
75 TABLET ORAL DAILY
Status: DISCONTINUED | OUTPATIENT
Start: 2022-03-14 | End: 2022-03-15 | Stop reason: HOSPADM

## 2022-03-14 RX ORDER — PANTOPRAZOLE SODIUM 40 MG/1
40 TABLET, DELAYED RELEASE ORAL
Status: DISCONTINUED | OUTPATIENT
Start: 2022-03-14 | End: 2022-03-15 | Stop reason: HOSPADM

## 2022-03-14 RX ORDER — LISINOPRIL 5 MG/1
5 TABLET ORAL DAILY
Status: DISCONTINUED | OUTPATIENT
Start: 2022-03-14 | End: 2022-03-15 | Stop reason: HOSPADM

## 2022-03-14 RX ORDER — SODIUM CHLORIDE 0.9 % (FLUSH) 0.9 %
5-40 SYRINGE (ML) INJECTION EVERY 8 HOURS
Status: DISCONTINUED | OUTPATIENT
Start: 2022-03-14 | End: 2022-03-15 | Stop reason: HOSPADM

## 2022-03-14 RX ORDER — CETIRIZINE HCL 10 MG
10 TABLET ORAL DAILY
Status: DISCONTINUED | OUTPATIENT
Start: 2022-03-14 | End: 2022-03-15 | Stop reason: HOSPADM

## 2022-03-14 RX ORDER — POLYETHYLENE GLYCOL 3350 17 G/17G
17 POWDER, FOR SOLUTION ORAL DAILY PRN
Status: DISCONTINUED | OUTPATIENT
Start: 2022-03-14 | End: 2022-03-15 | Stop reason: HOSPADM

## 2022-03-14 RX ORDER — AMLODIPINE BESYLATE 5 MG/1
5 TABLET ORAL DAILY
Status: DISCONTINUED | OUTPATIENT
Start: 2022-03-14 | End: 2022-03-15 | Stop reason: HOSPADM

## 2022-03-14 RX ORDER — GUAIFENESIN 600 MG/1
600 TABLET, EXTENDED RELEASE ORAL 2 TIMES DAILY
Status: DISCONTINUED | OUTPATIENT
Start: 2022-03-14 | End: 2022-03-15 | Stop reason: HOSPADM

## 2022-03-14 RX ORDER — INSULIN LISPRO 100 [IU]/ML
INJECTION, SOLUTION INTRAVENOUS; SUBCUTANEOUS
Status: DISCONTINUED | OUTPATIENT
Start: 2022-03-14 | End: 2022-03-15 | Stop reason: HOSPADM

## 2022-03-14 RX ORDER — SODIUM CHLORIDE 0.9 % (FLUSH) 0.9 %
5-40 SYRINGE (ML) INJECTION AS NEEDED
Status: DISCONTINUED | OUTPATIENT
Start: 2022-03-14 | End: 2022-03-15 | Stop reason: HOSPADM

## 2022-03-14 RX ORDER — ONDANSETRON 4 MG/1
4 TABLET, ORALLY DISINTEGRATING ORAL
Status: DISCONTINUED | OUTPATIENT
Start: 2022-03-14 | End: 2022-03-15 | Stop reason: HOSPADM

## 2022-03-14 RX ORDER — CARVEDILOL 3.12 MG/1
3.12 TABLET ORAL 2 TIMES DAILY
Status: DISCONTINUED | OUTPATIENT
Start: 2022-03-14 | End: 2022-03-15 | Stop reason: HOSPADM

## 2022-03-14 RX ORDER — ACETAMINOPHEN 650 MG/1
650 SUPPOSITORY RECTAL
Status: DISCONTINUED | OUTPATIENT
Start: 2022-03-14 | End: 2022-03-15 | Stop reason: HOSPADM

## 2022-03-14 RX ORDER — ACETAMINOPHEN 325 MG/1
650 TABLET ORAL
Status: DISCONTINUED | OUTPATIENT
Start: 2022-03-14 | End: 2022-03-15 | Stop reason: HOSPADM

## 2022-03-14 RX ORDER — NITROGLYCERIN 0.4 MG/1
0.4 TABLET SUBLINGUAL
Status: DISCONTINUED | OUTPATIENT
Start: 2022-03-14 | End: 2022-03-15 | Stop reason: HOSPADM

## 2022-03-14 RX ORDER — MAGNESIUM SULFATE 100 %
4 CRYSTALS MISCELLANEOUS AS NEEDED
Status: DISCONTINUED | OUTPATIENT
Start: 2022-03-14 | End: 2022-03-15 | Stop reason: HOSPADM

## 2022-03-14 RX ORDER — ASPIRIN 81 MG/1
81 TABLET ORAL DAILY
Status: DISCONTINUED | OUTPATIENT
Start: 2022-03-14 | End: 2022-03-15 | Stop reason: HOSPADM

## 2022-03-14 RX ADMIN — LAMOTRIGINE 25 MG: 25 TABLET ORAL at 08:43

## 2022-03-14 RX ADMIN — CARVEDILOL 3.12 MG: 3.12 TABLET, FILM COATED ORAL at 17:23

## 2022-03-14 RX ADMIN — SODIUM CHLORIDE, POTASSIUM CHLORIDE, SODIUM LACTATE AND CALCIUM CHLORIDE 100 ML/HR: 600; 310; 30; 20 INJECTION, SOLUTION INTRAVENOUS at 21:49

## 2022-03-14 RX ADMIN — GUAIFENESIN 600 MG: 600 TABLET, EXTENDED RELEASE ORAL at 08:42

## 2022-03-14 RX ADMIN — LAMOTRIGINE 25 MG: 25 TABLET ORAL at 17:23

## 2022-03-14 RX ADMIN — SODIUM CHLORIDE, PRESERVATIVE FREE 10 ML: 5 INJECTION INTRAVENOUS at 21:44

## 2022-03-14 RX ADMIN — SODIUM CHLORIDE, PRESERVATIVE FREE 10 ML: 5 INJECTION INTRAVENOUS at 08:46

## 2022-03-14 RX ADMIN — CARVEDILOL 3.12 MG: 3.12 TABLET, FILM COATED ORAL at 08:43

## 2022-03-14 RX ADMIN — ROSUVASTATIN CALCIUM 10 MG: 10 TABLET, FILM COATED ORAL at 21:41

## 2022-03-14 RX ADMIN — CLOPIDOGREL BISULFATE 75 MG: 75 TABLET ORAL at 08:42

## 2022-03-14 RX ADMIN — LISINOPRIL 5 MG: 5 TABLET ORAL at 08:42

## 2022-03-14 RX ADMIN — CETIRIZINE HYDROCHLORIDE 10 MG: 10 TABLET, FILM COATED ORAL at 08:43

## 2022-03-14 RX ADMIN — GUAIFENESIN 600 MG: 600 TABLET, EXTENDED RELEASE ORAL at 17:23

## 2022-03-14 RX ADMIN — AMLODIPINE BESYLATE 5 MG: 5 TABLET ORAL at 08:44

## 2022-03-14 RX ADMIN — HEPARIN SODIUM 5000 UNITS: 5000 INJECTION, SOLUTION INTRAVENOUS; SUBCUTANEOUS at 21:41

## 2022-03-14 RX ADMIN — SODIUM CHLORIDE, POTASSIUM CHLORIDE, SODIUM LACTATE AND CALCIUM CHLORIDE 100 ML/HR: 600; 310; 30; 20 INJECTION, SOLUTION INTRAVENOUS at 08:44

## 2022-03-14 RX ADMIN — PANTOPRAZOLE SODIUM 40 MG: 40 TABLET, DELAYED RELEASE ORAL at 08:42

## 2022-03-14 RX ADMIN — ASPIRIN 81 MG: 81 TABLET, COATED ORAL at 08:43

## 2022-03-14 NOTE — PROGRESS NOTES
Transition of Care Plan  RUR N/A     Virtual reviewer communicated change to CM, which reflect outpatient observation order written prior to Written discharge order. Code 44 delivered and given to patient. CM met with the patient and provided to the patient the printed information about her outpatient observation status. The patient was given the flyer entitled, \"Medicare Outpatient Observation: Information & notification. \" All questions were answered, no additional discharge needs identified at this time and patient expects to return to their (home, assisted living facility, relatives home, etc.) after discharge today. Copy provided to patient or sent secure email, secure fax , or certified mail to patient's loved one per their request.    Medicare Outpatient Observation Notice (MOON)/ [place] Outpatient Observation Notice (Avie Jesus) provided to patient/representative with verbal explanation of the notice. Time allotted for questions regarding the notice. Patient /representative provided a completed copy of the MOON/VOON notice. Copy placed on bedside chart. [facility] 3/15/22    Disposition   Home  Transportation   [facility] TBD  Will arrange if ordered  Medical follow up  PCP and specialist at [address] [phone]  Sister Sarah Rodriguez  [phone]    CM met with patient in his room   Confirmed admission due to chest pain- Hx of CAD, diabetes, HTN, CVA, and obstructive sleep apnea. Has Cpap but does not use it at home    Patient lives in senior citizens apartment with elevator alone. He is self care and independent-- driving and handling his own affairs. Retired -truck and . Receives  [facility]  No hx of rehab or HH    CM will follow and assist with any transition of care needs that arise.

## 2022-03-14 NOTE — ROUTINE PROCESS
TRANSFER - OUT REPORT:    Verbal report given to Prachi Castillo RN on Giana Tejeda  being transferred to [facility] for routine progression of care       Report consisted of patients Situation, Background, Assessment and   Recommendations(SBAR). Information from the following report(s) SBAR, ED Summary. Cardiac rhythm,Meds was reviewed with the receiving nurse. Lines:   Peripheral IV 03/13/22 Left Antecubital (Active)   Site Assessment Clean, dry, & intact 03/13/22 2125   Phlebitis Assessment 0 03/13/22 2125   Infiltration Assessment 0 03/13/22 2125   Dressing Status Clean, dry, & intact 03/13/22 2125   Dressing Type Transparent 03/13/22 2125   Hub Color/Line Status Pink 03/13/22 2125   Action Taken Blood drawn 03/13/22 2125        Opportunity for questions and clarification was provided.       Patient transported with:   Registered Nurse

## 2022-03-14 NOTE — PROGRESS NOTES
[address] [facility] Adult  Hospitalist Group                                                                                          Hospitalist Progress Note  Darylene Keys, MD  Answering service: [phone] or [phone] from in house phone        Date of Service:  3/14/2022  NAME:  Saniya Bauer  :  1955  MRN:  [id number]      Admission Summary:   HPI:  \"This is a 44-year-old man with a past medical history significant for coronary artery disease, status post stent placement, hypertension, type 2 diabetes, dyslipidemia, obstructive sleep apnea, stroke, who was in his usual state of health until the day of presentation at the emergency room when the patient developed chest pain. The chest pain initially started on the right side of the chest, radiated to the left side of the chest, lasted for about 3-4 minutes associated with shortness of breath. The chest pain was described as pleuritic with no known aggravating or relieving factors, 6/10 in severity. As a result of the chest pain, the patient stated that he felt dizzy, did not describe the dizziness as room spinning around him and subsequently passed out twice before coming to the emergency room. When the patient arrived at the emergency room, his troponin was within normal limits, but because the patient has established coronary artery disease and underwent stent placement, this  for similar presentation. The patient was referred to the hospitalist service for evaluation for admission. Currently, the patient's cardiologist is at [facility]. He was last admitted to this hospital from 2017 to 2017. The patient was admitted for evaluation and treatment of acute GI bleed as well as acute sigmoid diverticulitis. The patient has no associated fever, rigors, or chills. \"    Interval history / Subjective:   Patient seen examined at bedside still complains of mild dizziness especially upon sitting up states this is different from his symptoms of vertigo     Assessment & Plan:     Chest pain  Ddx:  it initially was on right side but is radiating to the left side of his chest given his history of coronary disease with stents still high risk despite the negative troponins. Lipase also mildly elevated   -Cardiology consulted awaiting recs, may benefit from stress test  -Tropes negative x2  -Follow-up urine drug screen, D-dimer, ethanol level  -Lipase is mildly elevated > given pain radiation will obtain CT abdomen pelvis Noncon to evaluate for pancreatitis      Syncope. - Unclear etiology but patient has prior history of multiple CVAs will need to rule out acute on chronic  Follow-up orthostats  CT Noncon negative  -keep on tele   Follow-up MRI/MRA head , carotid dopplers      Hypertension. Continue home medication    Type 2 diabetes. Follow-up A1c, sliding scale with insulin coverage. Acute kidney injury, most likely due to volume depletion. - cont w/ IVF > creatinine downtrending, baseline Cr around 1.2 on chart review in 2017    Obstructive sleep apnea. We will place the patient on CPAP at bedtime. Code status: Full  DVT prophylaxis: Heparin subcu    Care Plan discussed with: Patient/Family and Nurse  Anticipated Disposition: Home w/Family  Anticipated Discharge: 24 hours to 48 hours     Hospital Problems  Date Reviewed: 3/14/2022          Codes Class Noted POA    * (Principal) Chest pain ICD-10-CM: R07.9  ICD-9-CM: 786.50  3/13/2022 Yes                Review of Systems:   A comprehensive review of systems was negative except for that written in the HPI. Vital Signs:    Last 24hrs VS reviewed since prior progress note.  Most recent are:  Visit Vitals  /85 (BP 1 Location: Left upper arm, BP Patient Position: Standing)   Pulse (!) 58   Temp 98.4 °F (36.9 °C)   Resp 18   Ht 5' 7\" (1.702 m)   Wt 93.6 kg (206 lb 5.6 oz)   SpO2 96%   BMI 32.32 kg/m²         Intake/Output Summary (Last 24 hours) at 3/14/2022 1350  Last data filed at 3/14/2022 0844  Gross per 24 hour   Intake 1030 ml   Output    Net 1030 ml        Physical Examination:     I had a face to face encounter with this patient and independently examined them on 3/14/2022 as outlined below:          Constitutional:  No acute distress, cooperative, pleasant    ENT:  Oral mucosa moist, oropharynx benign. Resp:  CTA bilaterally. No wheezing/rhonchi/rales. No accessory muscle use   CV:  Regular rhythm, normal rate, no murmurs, gallops, rubs    GI:  Soft, non distended, non tender. normoactive bowel sounds, no hepatosplenomegaly     Musculoskeletal:  No edema, warm, 2+ pulses throughout    Neurologic:  Moves all extremities 4/5 strength left lower extremity sensation intact bilateral upper extremities lower extremity. AAOx3, CN II-XII reviewed            Data Review:    Review and/or order of clinical lab test  Review and/or order of tests in the radiology section of CPT  Review and/or order of tests in the medicine section of CPT      Labs:     Recent Labs     03/14/22  0808 03/13/22 2120   WBC 4.9 5.3   HGB 12.6 13.0   HCT 42.7 44.3   * 164     Recent Labs     03/14/22  0808 03/13/22 2120    140   K 4.1 4.3   * 108   CO2 25 29   BUN 15 17   CREA 1.31* 1.63*   GLU 95 100   CA 8.7 8.8   MG 1.8  --    PHOS 3.4  --      Recent Labs     03/14/22  0808 03/13/22 2120   ALT 16 19   AP 73 76   TBILI 0.3 0.3   TP 6.3* 7.4   ALB 3.4* 3.7   GLOB 2.9 3.7   LPSE 505*  --      No results for input(s): INR, PTP, APTT, INREXT in the last 72 hours. No results for input(s): FE, TIBC, PSAT, FERR in the last 72 hours. No results found for: FOL, RBCF   No results for input(s): PH, PCO2, PO2 in the last 72 hours. No results for input(s): CPK, CKNDX, TROIQ in the last 72 hours.     No lab exists for component: CPKMB  Lab Results   Component Value Date/Time    Cholesterol, total 143 03/14/2022 08:08 AM    HDL Cholesterol 37 03/14/2022 08:08 AM    LDL, calculated 88.4 03/14/2022 08:08 AM    Triglyceride 88 03/14/2022 08:08 AM    CHOL/HDL Ratio 3.9 03/14/2022 08:08 AM     Lab Results   Component Value Date/Time    Glucose (POC) 81 03/14/2022 01:13 PM    Glucose (POC) 85 03/14/2022 08:10 AM    Glucose (POC) 104 11/30/2010 06:10 PM     Lab Results   Component Value Date/Time    Color DARK YELLOW 02/04/2017 10:30 AM    Appearance CLEAR 02/04/2017 10:30 AM    Specific gravity 1.025 02/04/2017 10:30 AM    pH (UA) 5.5 02/04/2017 10:30 AM    Protein NEGATIVE  02/04/2017 10:30 AM    Glucose NEGATIVE  02/04/2017 10:30 AM    Ketone TRACE (A) 02/04/2017 10:30 AM    Bilirubin NEGATIVE  02/04/2017 10:30 AM    Urobilinogen 1.0 02/04/2017 10:30 AM    Nitrites NEGATIVE  02/04/2017 10:30 AM    Leukocyte Esterase TRACE (A) 02/04/2017 10:30 AM    Epithelial cells FEW 02/04/2017 10:30 AM    Bacteria NEGATIVE  02/04/2017 10:30 AM    WBC 0-4 02/04/2017 10:30 AM    RBC 0-5 02/04/2017 10:30 AM         Medications Reviewed:     Current Facility-Administered Medications   Medication Dose Route Frequency    amLODIPine (NORVASC) tablet 5 mg  5 mg Oral DAILY    aspirin delayed-release tablet 81 mg  81 mg Oral DAILY    carvediloL (COREG) tablet 3.125 mg  3.125 mg Oral BID    cetirizine (ZYRTEC) tablet 10 mg  10 mg Oral DAILY    clopidogreL (PLAVIX) tablet 75 mg  75 mg Oral DAILY    guaiFENesin ER (MUCINEX) tablet 600 mg  600 mg Oral BID    lamoTRIgine (LaMICtal) tablet 25 mg  25 mg Oral BID    lisinopriL (PRINIVIL, ZESTRIL) tablet 5 mg  5 mg Oral DAILY    nitroglycerin (NITROSTAT) tablet 0.4 mg  0.4 mg SubLINGual Q5MIN PRN    pantoprazole (PROTONIX) tablet 40 mg  40 mg Oral ACB    rosuvastatin (CRESTOR) tablet 10 mg  10 mg Oral QHS    sodium chloride (NS) flush 5-40 mL  5-40 mL IntraVENous Q8H    sodium chloride (NS) flush 5-40 mL  5-40 mL IntraVENous PRN    acetaminophen (TYLENOL) tablet 650 mg  650 mg Oral Q6H PRN    Or    acetaminophen (TYLENOL) suppository 650 mg  650 mg Rectal Q6H PRN    polyethylene glycol (MIRALAX) packet 17 g  17 g Oral DAILY PRN    ondansetron (ZOFRAN ODT) tablet 4 mg  4 mg Oral Q8H PRN    Or    ondansetron (ZOFRAN) injection 4 mg  4 mg IntraVENous Q6H PRN    heparin (porcine) injection 5,000 Units  5,000 Units SubCUTAneous Q8H    insulin lispro (HUMALOG) injection   SubCUTAneous AC&HS    glucose chewable tablet 16 g  4 Tablet Oral PRN    glucagon (GLUCAGEN) injection 1 mg  1 mg IntraMUSCular PRN    dextrose 10 % infusion 0-250 mL  0-250 mL IntraVENous PRN    morphine injection 2 mg  2 mg IntraVENous Q4H PRN    lactated Ringers infusion  100 mL/hr IntraVENous CONTINUOUS     ______________________________________________________________________  EXPECTED LENGTH OF STAY: - - -  ACTUAL LENGTH OF STAY:          1                 Amilcar Ruano MD

## 2022-03-14 NOTE — PROGRESS NOTES
[address] March 14, 2022       RE: Trinity Wright      To Whom It May Concern,    This is to certify that Trinity Wright should  be excused from his court appointment for medical reasons as he is currently hospitalized at [facility]. Please feel free to contact my office if you have any questions or concerns. Thank you for your assistance in this matter.       Sincerely,  Martha Roman MD

## 2022-03-14 NOTE — ED PROVIDER NOTES
HPI     61 Year old male with a history of coronary artery disease status post 2 stents this past summer, diabetes, hypertension, syncope, stroke, vertigo, presents emergency department complaining of intermittent chest pain off and on all day today. Patient states he has associated shortness of breath and sweating but no nausea. States pain started on the right side but then spreads to the left side and last about 3 to 4 minutes. It happened multiple times throughout the day today. He states these are the same symptoms he had this past summer when he had stents placed. He states his cardiologist is at [facility]. He does report a cough today. He has been vaccinated. Denies calf pain or swelling. Denies any pain currently.     Past Medical History:   Diagnosis Date    Arthritis     OSTEO    CAD (coronary artery disease)     2 heart stents/2 stents another time/mild heart attack    Chest pain     Diabetes ([address]     Fainting     Gastrointestinal disorder     acid reflux    GERD (gastroesophageal reflux disease)     History of seasonal allergies     \"HAY FEVER\"    Hypertension     SOB (shortness of breath)     Stroke ([facility])     strokes x 4 - no deficits    Unintentional weight change     Unspecified sleep apnea     USES CPAP/no longer uses CPAP    Vertigo     Visual disturbance        Past Surgical History:   Procedure Laterality Date    COLONOSCOPY N/A 7/1/2021    COLONOSCOPY AND EGD performed by Haleigh Herrera MD at [facility] ENDOSCOPY    HX HEART CATHETERIZATION      x2    HX ORTHOPAEDIC      BOOGIE KNEES x 3 (2 on one knee and once on the other knee) times d/t football injury    HX ORTHOPAEDIC      2014 rt thumb nerve release    HX OTHER SURGICAL      left eye implant     HX OTHER SURGICAL      KIDNEY STONES REMOVED    HX TONSIL AND ADENOIDECTOMY      FL ABDOMEN SURGERY PROC UNLISTED      Tara Dee bladder removed 2014         Family History:   Problem Relation Age of Onset    Diabetes Mother    Alvin Niranjan Cancer Father         THROAT    Diabetes Sister     Diabetes Maternal Aunt     Alzheimer's Disease Maternal Uncle     Diabetes Maternal Aunt        Social History     Socioeconomic History    Marital status: SINGLE     Spouse name: Not on file    Number of children: Not on file    Years of education: Not on file    Highest education level: Not on file   Occupational History    Not on file   Tobacco Use    Smoking status: Never Smoker    Smokeless tobacco: Never Used   Vaping Use    Vaping Use: Never used   Substance and Sexual Activity    Alcohol use: No    Drug use: No    Sexual activity: Not on file   Other Topics Concern    Not on file   Social History Narrative    Not on file     Social Determinants of Health     Financial Resource Strain:     Difficulty of Paying Living Expenses: Not on file   Food Insecurity:     Worried About Running Out of Food in the Last Year: Not on file    Varinder of Food in the Last Year: Not on file   Transportation Needs:     Lack of Transportation (Medical): Not on file    Lack of Transportation (Non-Medical):  Not on file   Physical Activity:     Days of Exercise per Week: Not on file    Minutes of Exercise per Session: Not on file   Stress:     Feeling of Stress : Not on file   Social Connections:     Frequency of Communication with Friends and Family: Not on file    Frequency of Social Gatherings with Friends and Family: Not on file    Attends Yazidi Services: Not on file    Active Member of Clubs or Organizations: Not on file    Attends Club or Organization Meetings: Not on file    Marital Status: Not on file   Intimate Partner Violence:     Fear of Current or Ex-Partner: Not on file    Emotionally Abused: Not on file    Physically Abused: Not on file    Sexually Abused: Not on file   Housing Stability:     Unable to Pay for Housing in the Last Year: Not on file    Number of [place] in the Last Year: Not on file    Unstable Housing in the Last Year: Not on file         ALLERGIES: Contrast agent [iodine]    Review of Systems   Constitutional: Negative for fever. HENT: Negative for congestion. Eyes: Negative for visual disturbance. Respiratory: Positive for cough, chest tightness and shortness of breath. Cardiovascular: Positive for chest pain. Negative for palpitations and leg swelling. Gastrointestinal: Negative for abdominal pain, nausea and vomiting. Endocrine: Negative for polyuria. Genitourinary: Negative for dysuria. Musculoskeletal: Negative for gait problem. Neurological: Negative for headaches. Psychiatric/Behavioral: Negative for dysphoric mood. Vitals:    03/13/22 2051   BP: 123/77   Pulse: 61   Resp: 18   Temp: 98 °F (36.7 °C)   SpO2: 98%            Physical Exam  Constitutional:       General: He is not in acute distress. Appearance: He is well-developed. HENT:      Head: Normocephalic and atraumatic. Mouth/Throat:      Pharynx: No oropharyngeal exudate. Eyes:      General: No scleral icterus. Right eye: No discharge. Left eye: No discharge. Pupils: Pupils are equal, round, and reactive to light. Neck:      Vascular: No JVD. Cardiovascular:      Rate and Rhythm: Normal rate and regular rhythm. Heart sounds: Normal heart sounds. No murmur heard. Pulmonary:      Effort: Pulmonary effort is normal. No respiratory distress. Breath sounds: Normal breath sounds. No stridor. No wheezing or rales. Chest:      Chest wall: No tenderness. Abdominal:      General: Bowel sounds are normal. There is no distension. Palpations: Abdomen is soft. There is no mass. Tenderness: There is no abdominal tenderness. There is no guarding or rebound. Musculoskeletal:         General: Normal range of motion. Cervical back: Normal range of motion and neck supple. Skin:     General: Skin is warm and dry.       Capillary Refill: Capillary refill takes less than 2 seconds. Findings: No rash. Neurological:      Mental Status: He is oriented to person, place, and time. Psychiatric:         Behavior: Behavior normal.         Thought Content: Thought content normal.         Judgment: Judgment normal.          MDM       Procedures    ED EKG interpretation:  Rhythm: normal sinus rhythm; and regular . Rate (approx.): 62; Axis: normal; P wave: normal; QRS interval: normal ; ST/T wave: non-specific changes; . This EKG was interpreted by Catarina Prescott MD,ED Provider. Perfect Serve Consult for Admission  10:49 PM    ED Room Number: B/HB  Patient Name and age:  Samira Hawkins 61 y.o.  male  Working Diagnosis:   1. Chest pain, unspecified type        COVID-19 Suspicion:  no  Sepsis present:  no  Reassessment needed: no  Code Status:  Full Code  Readmission: no  Isolation Requirements:  no  Recommended Level of Care:  telemetry  Department:[facility] Adult ED - 21   Other:  61year old male with CAD s/p stents at [facility] this past summer presents with similar episodes of pain off and on today. States he passed out 2x. No pain currently. EKG non ischemic, initial trop neg. Received aspirin in route.

## 2022-03-14 NOTE — ED TRIAGE NOTES
EMS reports the pt comes from home with a CC of dizziness since 0900 this morning. EMS reports right sided chest pain. EMS reports a Hx of MI 3 months ago. Pt reports he has been having intermittent dizziness and chest pain since 0800 this morning. Pt reports dizziness.    Pt denies chest pain at time of triage, nausea, sweating

## 2022-03-14 NOTE — ED NOTES
Pt has been medicated per orders. Pt  w/o complaints or concerns @ this time. Will cont to monitor.   VSS

## 2022-03-14 NOTE — H&P
[address] [facility]  HISTORY AND PHYSICAL    Name:  Roberta Whitt  MR#:  [id number]  :  1955  ACCOUNT #:  [de-identified]  ADMIT DATE:  2022      The patient was seen, evaluated, and admitted by me on 2022. PRIMARY CARE PROVIDER:  Shadia Marks NP    SOURCE OF INFORMATION:  Patient and review of ED and old electronic medical records. CHIEF COMPLAINT:  Chest pain. HISTORY OF PRESENT ILLNESS:  This is a 45-year-old man with a past medical history significant for coronary artery disease, status post stent placement, hypertension, type 2 diabetes, dyslipidemia, obstructive sleep apnea, stroke, who was in his usual state of health until the day of presentation at the emergency room when the patient developed chest pain. The chest pain initially started on the right side of the chest, radiated to the left side of the chest, lasted for about 3-4 minutes associated with shortness of breath. The chest pain was described as pleuritic with no known aggravating or relieving factors, 6/10 in severity. As a result of the chest pain, the patient stated that he felt dizzy, did not describe the dizziness as room spinning around him and subsequently passed out twice before coming to the emergency room. When the patient arrived at the emergency room, his troponin was within normal limits, but because the patient has established coronary artery disease and underwent stent placement. The patient was referred to the hospitalist service for evaluation for admission. Currently, the patient's cardiologist is at [facility]. He was last admitted to this hospital from 2017 to 2017. The patient was admitted for evaluation and treatment of acute GI bleed as well as acute sigmoid diverticulitis. The patient has no associated fever, rigors, or chills.     PAST MEDICAL HISTORY:  Coronary artery disease, status post stent placement, type 2 diabetes, dyslipidemia, hypertension, obstructive sleep apnea, and stroke. ALLERGIES:  THE PATIENT IS ALLERGIC TO CONTRAST AGENT. MEDICATIONS:  1.  Norvasc 5 mg daily. 2.  Aspirin 81 mg daily. 3.  Coreg 3.125 mg twice daily. 4.  Zyrtec 10 mg daily. 5.  Plavix 75 mg daily. 6.  Mucinex 600 mg twice daily. 7.  Lamictal 25 mg daily. 8.  Lisinopril 5 mg daily. 9.  Glucophage 500 mg 3 times daily. 10.  Nitrostat 0.4 mg sublingual every 5 minutes as needed for chest pain. 11.  Protonix 40 mg daily. 12.  Crestor 10 mg daily at bedtime. FAMILY HISTORY:  This was reviewed. His mother had diabetes. His father had throat cancer. PAST SURGICAL HISTORY:  This is significant for cholecystectomy and tonsillectomy. SOCIAL HISTORY:  No history of alcohol or tobacco abuse. REVIEW OF SYSTEMS:  HEAD, EYES, EARS, NOSE, AND THROAT:  This is positive for dizziness and syncope. No blurring of vision. No photophobia. RESPIRATORY:  This is positive for shortness of breath and cough. No hemoptysis. CARDIOVASCULAR:  This is positive for chest pain. No orthopnea. No palpitation. GASTROINTESTINAL:  No nausea or vomiting. No diarrhea. No constipation. GENITOURINARY:  No dysuria. No urgency. No frequency. All other systems are reviewed and they are negative. PHYSICAL EXAMINATION:  GENERAL APPEARANCE:  The patient appeared ill, in moderate distress. VITAL SIGNS:  On arrival at the emergency room, temperature 98, pulse 61, respiratory rate 18, blood pressure 123/77, and oxygen saturation 98% on room air. HEAD:  Normocephalic, atraumatic. EYES:  Normal eye movement. No redness, no drainage, no discharge. EARS:  Normal external ears with no obvious drainage. NOSE:  No deformity and no drainage. MOUTH AND THROAT:  No visible oral lesion. Dry oral mucosa. NECK:  Neck is supple. No JVD, no thyromegaly. CHEST:  Clear breath sounds. No wheezing, no crackles. HEART:  Normal S1 and S2, regular. No clinically appreciable murmur.   ABDOMEN:  Soft, nontender. Normal bowel sounds. CENTRAL NERVOUS SYSTEM:  Alert and oriented x3. No gross focal neurological deficit. EXTREMITIES:  No edema. Pulses 2+ bilaterally. MUSCULOSKELETAL:  No obvious joint deformity or swelling. SKIN:  No active skin lesions seen in the exposed parts of the body. PSYCHIATRIC:  Normal mood and affect. LYMPHATIC:  No cervical lymphadenopathy. DIAGNOSTIC DATA:  EKG shows normal sinus rhythm and nonspecific ST and T-wave abnormalities. LABORATORY DATA:  Hematology, WBC 5.3, hemoglobin 13.0, hematocrit 44.3, and platelets 930. Chemistry, sodium 140, potassium 4.3, chloride 108, CO2 of 29, glucose 100, BUN 17, creatinine 1.63, calcium 8.8. Total bilirubin 0.3, ALT 19, AST 11, alkaline phosphatase 76, total protein 7.4, albumin level 3.7, globulin 3.7. Cardiac profile, troponin high-sensitivity 7. ASSESSMENT:  1. Chest pain. 2.  Syncope. 3.  Hypertension. 4.  Type 2 diabetes. 5.  Dyslipidemia. 6.  Acute kidney injury. 7.  Obstructive sleep apnea. PLAN:  1. Chest pain. We will admit the patient for further evaluation and treatment. This is a known patient with coronary artery disease, status post stent placement. We will check serial cardiac markers to rule out acute myocardial infarction. Cardiology consult will be requested to assist in further evaluation and treatment. The patient will be n.p.o. in anticipation of any potential intervention by the cardiologist.  The patient will also be evaluated for atypical causes of chest pain. We will check lipase level and D-dimer to evaluate the patient for thromboembolism. We will also check urine drug screen. We will carry out pain control with Nitrostat and morphine. 2.  Syncope. We will carry out orthostatic vital signs. We will check CT scan of the head stat.   We will also obtain MRI/MRA of the brain, carotid Doppler of the neck as well as echocardiogram to evaluate the patient for stroke as a possible cause of syncope. We will check cardiac markers to rule out acute myocardial infarction as a possible cause of syncope. We will also check D-dimer to evaluate the patient for thromboembolism as another possible cause of syncope. 3.  Hypertension. We will resume preadmission medication. We will monitor the patient's blood pressure closely. 4.  Type 2 diabetes. The patient will be placed on sliding scale with insulin coverage. We will check hemoglobin A1c level. We will hold oral agent till the time of discharge from the hospital.  5.  Acute kidney injury, most likely due to volume depletion. We will carry out fluid therapy and monitor the patient's renal function  6. Obstructive sleep apnea. We will place the patient on CPAP at bedtime. 7.  Other issues:  Code status, the patient is a full code. We will place the patient on heparin for DVT prophylaxis. FUNCTIONAL STATUS PRIOR TO ADMISSION:  The patient came from home. The patient is ambulatory with no assistive device. COVID PRECAUTION:  The patient was wearing a face mask. I was wearing a face mask and gloves for this patient's encounter.         MD RUPERTO Ohara/[email or web address]  D:  03/14/2022 3:29  T:  03/14/2022 4:54  JOB #:  [id number]  CC:  Gini Olivares NP

## 2022-03-14 NOTE — ED NOTES
Report has been called to Balaji Foote RN on [facility] for room 441.

## 2022-03-14 NOTE — CONSULTS
[address] [facility] Cardiology Consultation    Date of Consult:  03/14/22  Date of Admission: 3/13/2022  Primary Cardiologist: [facility] Cardiology - Dr. Grace Terry  Physician Requesting consult: Dr. Clint Lefort    Chief Complaint / Reason for Consult:   Chest pain, lightheadedness    History of Present Illness:  Kim Weber is a 61 y.o. male with the below listed medical history who was admitted with chest pain. History is notable for CAD status post prior stent placement, hypertension, diabetes mellitus type 2, dyslipidemia, obstructive sleep apnea prior stroke. He was in his usual state health yesterday and woke up with chest discomfort. Also had lightheadedness. Rested and LH improved, then went to get breakfast.  Describes LH as feeling faint. Went back to lie down and his LH worsened. He felt very faint. Says he passed out for a few minutes. Then started to have right sided chest pain that migrated to his left side. Had some dyspnea as well. Lasted for about 3-5 minutes. Then it resolved. Thinks the discomfort is similar to discomfort prior to previously placed stent. On arrival to the emergency department his high sensitive troponin trended from 7 to 9. Has occasional LH with first sitting up. Cr up and received IVF. Feels improved since then. Has had some chest discomfort since being here, last today. Same as above.       Past Medical History:   Diagnosis Date    Arthritis     OSTEO    CAD (coronary artery disease)     2 heart stents/2 stents another time/mild heart attack    Chest pain     Diabetes ([address]     Fainting     Gastrointestinal disorder     acid reflux    GERD (gastroesophageal reflux disease)     History of seasonal allergies     \"HAY FEVER\"    Hypertension     SOB (shortness of breath)     Stroke (HCC)     strokes x 4 - no deficits    Unintentional weight change     Unspecified sleep apnea     USES CPAP/no longer uses CPAP    Vertigo     Visual disturbance Prior to Admission medications    Medication Sig Start Date End Date Taking? Authorizing Provider   lamoTRIgine (LaMICtal) 25 mg tablet TAKE 1 TABLET TWICE DAILY 10/29/21  Yes Joe Cota MD   cetirizine (ZYRTEC) 10 mg tablet Take 10 mg by mouth daily. Yes Provider, Historical   amLODIPine (NORVASC) 5 mg tablet Take 5 mg by mouth daily. Yes Provider, Historical   metFORMIN (GLUCOPHAGE) 500 mg tablet Take 500 mg by mouth three (3) times daily (with meals). Yes Provider, Historical   multivit-min/iron/folic acid/K (ADULTS MULTIVITAMIN PO) Take  by mouth. 50 plus. Takes one po once daily. Yes Provider, Historical   rosuvastatin (CRESTOR) 10 mg tablet Take 10 mg by mouth nightly. Yes Provider, Historical   aspirin delayed-release 81 mg tablet TK 1 T PO D 8/2/19  Yes Provider, Historical   carvedilol (COREG) 3.125 mg tablet Take 3.125 mg by mouth two (2) times a day. Yes Provider, Historical   clopidogrel (PLAVIX) 75 mg tab Take 75 mg by mouth daily. Yes Provider, Historical   lisinopril (PRINIVIL, ZESTRIL) 5 mg tablet Take 5 mg by mouth daily. Yes Provider, Historical   nitroglycerin (NITROSTAT) 0.4 mg SL tablet 0.4 mg by SubLINGual route every five (5) minutes as needed for Chest Pain. Up to 3 doses. Yes Provider, Historical   pantoprazole (PROTONIX) 40 mg tablet Take 1 Tab by mouth Daily (before breakfast). 2/6/17  Yes Angelica Treviño NP   guaiFENesin ER (Mucinex) 600 mg ER tablet Take 600 mg by mouth two (2) times a day.     Provider, Historical       Current Facility-Administered Medications   Medication Dose Route Frequency    amLODIPine (NORVASC) tablet 5 mg  5 mg Oral DAILY    aspirin delayed-release tablet 81 mg  81 mg Oral DAILY    carvediloL (COREG) tablet 3.125 mg  3.125 mg Oral BID    cetirizine (ZYRTEC) tablet 10 mg  10 mg Oral DAILY    clopidogreL (PLAVIX) tablet 75 mg  75 mg Oral DAILY    guaiFENesin ER (MUCINEX) tablet 600 mg  600 mg Oral BID    lamoTRIgine (LaMICtal) tablet 25 mg  25 mg Oral BID    lisinopriL (PRINIVIL, ZESTRIL) tablet 5 mg  5 mg Oral DAILY    nitroglycerin (NITROSTAT) tablet 0.4 mg  0.4 mg SubLINGual Q5MIN PRN    pantoprazole (PROTONIX) tablet 40 mg  40 mg Oral ACB    rosuvastatin (CRESTOR) tablet 10 mg  10 mg Oral QHS    sodium chloride (NS) flush 5-40 mL  5-40 mL IntraVENous Q8H    sodium chloride (NS) flush 5-40 mL  5-40 mL IntraVENous PRN    acetaminophen (TYLENOL) tablet 650 mg  650 mg Oral Q6H PRN    Or    acetaminophen (TYLENOL) suppository 650 mg  650 mg Rectal Q6H PRN    polyethylene glycol (MIRALAX) packet 17 g  17 g Oral DAILY PRN    ondansetron (ZOFRAN ODT) tablet 4 mg  4 mg Oral Q8H PRN    Or    ondansetron (ZOFRAN) injection 4 mg  4 mg IntraVENous Q6H PRN    heparin (porcine) injection 5,000 Units  5,000 Units SubCUTAneous Q8H    insulin lispro (HUMALOG) injection   SubCUTAneous AC&HS    glucose chewable tablet 16 g  4 Tablet Oral PRN    glucagon (GLUCAGEN) injection 1 mg  1 mg IntraMUSCular PRN    dextrose 10 % infusion 0-250 mL  0-250 mL IntraVENous PRN    morphine injection 2 mg  2 mg IntraVENous Q4H PRN    lactated Ringers infusion  100 mL/hr IntraVENous CONTINUOUS       Family History   Problem Relation Age of Onset    Diabetes Mother     Cancer Father         THROAT    Diabetes Sister     Diabetes Maternal Aunt     Alzheimer's Disease Maternal Uncle     Diabetes Maternal Aunt        Social History     Socioeconomic History    Marital status: SINGLE     Spouse name: Not on file    Number of children: Not on file    Years of education: Not on file    Highest education level: Not on file   Occupational History    Not on file   Tobacco Use    Smoking status: Never Smoker    Smokeless tobacco: Never Used   Vaping Use    Vaping Use: Never used   Substance and Sexual Activity    Alcohol use: No    Drug use: No    Sexual activity: Not on file   Other Topics Concern    Not on file   Social History Narrative    Not on file     Social Determinants of Health     Financial Resource Strain:     Difficulty of Paying Living Expenses: Not on file   Food Insecurity:     Worried About Running Out of Food in the Last Year: Not on file    Varinder of Food in the Last Year: Not on file   Transportation Needs:     Lack of Transportation (Medical): Not on file    Lack of Transportation (Non-Medical): Not on file   Physical Activity:     Days of Exercise per Week: Not on file    Minutes of Exercise per Session: Not on file   Stress:     Feeling of Stress : Not on file   Social Connections:     Frequency of Communication with Friends and Family: Not on file    Frequency of Social Gatherings with Friends and Family: Not on file    Attends [employer] Services: Not on file    Active Member of [address] [place] [address] [employer] or Organizations: Not on file    Attends Club or Organization Meetings: Not on file    Marital Status: Not on file   Intimate Partner Violence:     Fear of Current or Ex-Partner: Not on file    Emotionally Abused: Not on file    Physically Abused: Not on file    Sexually Abused: Not on file   Housing Stability:     Unable to Pay for Housing in the Last Year: Not on file    Number of [place] in the Last Year: Not on file    Unstable Housing in the Last Year: Not on file       Review of Systems   All other systems reviewed and are negative.       Visit Vitals  /85 (BP 1 Location: Left upper arm, BP Patient Position: Standing)   Pulse (!) 58   Temp 98.4 °F (36.9 °C)   Resp 18   Ht 5' 7\" (1.702 m)   Wt 93.6 kg (206 lb 5.6 oz)   SpO2 96%   BMI 32.32 kg/m²         Intake/Output Summary (Last 24 hours) at 3/14/2022 1326  Last data filed at 3/14/2022 0844  Gross per 24 hour   Intake 1030 ml   Output    Net 1030 ml        Physical Exam  GEN: NAD, appears stated age  HEENT: EOMI, MMM, OP clear  NECK: Normal JVP, carotids 2+ b/l and symmetrical  CV: RRR, normal S1 and S2, no M/R/G  LUNGS: CTAB, no W/R/R  ABD: NABS, soft, NT/ND  EXT: No edema, 2+ and symmetrical radial pulses b/l  PSYCH: Mood and affect normal  NEURO: AAO, MAEW, face symmetrical, speech intact    Lab Review:  BMP:   Lab Results   Component Value Date/Time     03/14/2022 08:08 AM    K 4.1 03/14/2022 08:08 AM     (H) 03/14/2022 08:08 AM    CO2 25 03/14/2022 08:08 AM    AGAP 5 03/14/2022 08:08 AM    GLU 95 03/14/2022 08:08 AM    BUN 15 03/14/2022 08:08 AM    CREA 1.31 (H) 03/14/2022 08:08 AM    GFRAA >60 03/14/2022 08:08 AM    GFRNA 55 (L) 03/14/2022 08:08 AM        CBC:  Lab Results   Component Value Date/Time    WBC 4.9 03/14/2022 08:08 AM    Hemoglobin (POC) 17.3 (H) 11/30/2010 06:10 PM    HGB 12.6 03/14/2022 08:08 AM    Hematocrit (POC) 51 (H) 11/30/2010 06:10 PM    HCT 42.7 03/14/2022 08:08 AM    PLATELET 067 (L) 75/24/2178 08:08 AM    MCV 81.5 03/14/2022 08:08 AM       All Cardiac Markers in the last 24 hours:  No results found for: CPK, CK, CKMMB, CKMB, RCK3, CKMBT, CKMBPOC, CKNDX, CKND1, ELIUD, TROPT, TROIQ, PATRIZIA, TROPT, TNIPOC, BNP, BNPP, BNPNT    Lab Results   Component Value Date/Time    Cholesterol, total 143 03/14/2022 08:08 AM    HDL Cholesterol 37 03/14/2022 08:08 AM    LDL, calculated 88.4 03/14/2022 08:08 AM    VLDL, calculated 17.6 03/14/2022 08:08 AM    Triglyceride 88 03/14/2022 08:08 AM    CHOL/HDL Ratio 3.9 03/14/2022 08:08 AM        Data Review:  ECG tracing personally reviewed:   Normal sinus rhythm with no ischemic changes. Echocardiogram:  Pending    Other cardiac testing: N/A    Other imaging: MRI brain pending    Assessment:    1. Chest pain   - HS troponin trend reassuring  2. Syncope  3. CAD s/p prior PCI  4. HTN  5. DM2  6. RODRIGUEZ  7.  GIO   - Improved with IVF administration    Recommendations / Plan:  - Still intermittent atypical sounding CP, HS troponin reassuring; plan Lexiscan MPI tomorrow  - Please keep NPO after MN; no caffeine after 19:00 today  - OK to eat now  - Continue outpatient CV medications  - Follow-up after discharge with primary cardiologist, Dr. Anne Blank  - Syncope likely related to intravascular volume depletion; agree with IVF  - Telemetry  - Echo pending  - Check orthostatics    Thank you for the opportunity to participate in the care of Samira Hawkins and please do not hesitate to contact us should you have any questions. Signed:  Abdullahi Sanchez.  Pedro Messer MD  Structural / [id number] [facility]  03/14/22

## 2022-03-15 ENCOUNTER — APPOINTMENT (OUTPATIENT)
Dept: VASCULAR SURGERY | Age: 67
End: 2022-03-15
Attending: INTERNAL MEDICINE
Payer: MEDICARE

## 2022-03-15 ENCOUNTER — APPOINTMENT (OUTPATIENT)
Dept: NUCLEAR MEDICINE | Age: 67
End: 2022-03-15
Attending: INTERNAL MEDICINE
Payer: MEDICARE

## 2022-03-15 ENCOUNTER — APPOINTMENT (OUTPATIENT)
Dept: NON INVASIVE DIAGNOSTICS | Age: 67
End: 2022-03-15
Attending: INTERNAL MEDICINE
Payer: MEDICARE

## 2022-03-15 VITALS
TEMPERATURE: 98.2 F | RESPIRATION RATE: 16 BRPM | WEIGHT: 206 LBS | DIASTOLIC BLOOD PRESSURE: 83 MMHG | OXYGEN SATURATION: 95 % | HEART RATE: 60 BPM | HEIGHT: 67 IN | BODY MASS INDEX: 32.33 KG/M2 | SYSTOLIC BLOOD PRESSURE: 131 MMHG

## 2022-03-15 LAB
ATRIAL RATE: 62 BPM
CALCULATED P AXIS, ECG09: 32 DEGREES
CALCULATED R AXIS, ECG10: 39 DEGREES
CALCULATED T AXIS, ECG11: 48 DEGREES
DIAGNOSIS, 93000: NORMAL
GLUCOSE BLD STRIP.AUTO-MCNC: 103 MG/DL (ref 65–117)
GLUCOSE BLD STRIP.AUTO-MCNC: 87 MG/DL (ref 65–117)
LEFT CCA DIST DIAS: 11.3 CM/S
LEFT CCA DIST SYS: 56.6 CM/S
LEFT CCA PROX DIAS: 11.3 CM/S
LEFT CCA PROX SYS: 63.6 CM/S
LEFT ECA DIAS: 9 CM/S
LEFT ECA SYS: 73.2 CM/S
LEFT ICA DIST DIAS: 16.8 CM/S
LEFT ICA DIST SYS: 49.8 CM/S
LEFT ICA MID DIAS: 15.4 CM/S
LEFT ICA MID SYS: 38.8 CM/S
LEFT ICA PROX DIAS: 12.3 CM/S
LEFT ICA PROX SYS: 40.3 CM/S
LEFT ICA/CCA SYS: 0.9
LEFT VERTEBRAL DIAS: 13.9 CM/S
LEFT VERTEBRAL SYS: 35.7 CM/S
P-R INTERVAL, ECG05: 156 MS
Q-T INTERVAL, ECG07: 406 MS
QRS DURATION, ECG06: 82 MS
QTC CALCULATION (BEZET), ECG08: 412 MS
RIGHT CCA DIST DIAS: 14.9 CM/S
RIGHT CCA DIST SYS: 56.2 CM/S
RIGHT CCA PROX DIAS: 14.1 CM/S
RIGHT CCA PROX SYS: 56.6 CM/S
RIGHT ECA DIAS: 6 CM/S
RIGHT ECA SYS: 40.3 CM/S
RIGHT ICA DIST DIAS: 13.3 CM/S
RIGHT ICA DIST SYS: 31.8 CM/S
RIGHT ICA MID DIAS: 12.7 CM/S
RIGHT ICA MID SYS: 40.5 CM/S
RIGHT ICA PROX DIAS: 13 CM/S
RIGHT ICA PROX SYS: 35.2 CM/S
RIGHT ICA/CCA SYS: 0.7
RIGHT VERTEBRAL DIAS: 11.5 CM/S
RIGHT VERTEBRAL SYS: 34.7 CM/S
SERVICE CMNT-IMP: NORMAL
SERVICE CMNT-IMP: NORMAL
STRESS BASELINE DIAS BP: 88 MMHG
STRESS BASELINE HR: 54 BPM
STRESS BASELINE ST DEPRESSION: 0 MM
STRESS BASELINE SYS BP: 137 MMHG
STRESS ESTIMATED WORKLOAD: 1 METS
STRESS EXERCISE DUR MIN: 3 MIN
STRESS EXERCISE DUR SEC: 0 SEC
STRESS PEAK DIAS BP: 88 MMHG
STRESS PEAK SYS BP: 137 MMHG
STRESS PERCENT HR ACHIEVED: 60 %
STRESS POST PEAK HR: 93 BPM
STRESS RATE PRESSURE PRODUCT: NORMAL BPM*MMHG
STRESS STAGE 1 BP: NORMAL MMHG
STRESS STAGE 1 HR: 83 BPM
STRESS STAGE RECOVERY 1 BP: NORMAL MMHG
STRESS STAGE RECOVERY 1 HR: 73 BPM
STRESS TARGET HR: 156 BPM
VENTRICULAR RATE, ECG03: 62 BPM

## 2022-03-15 PROCEDURE — 93880 EXTRACRANIAL BILAT STUDY: CPT

## 2022-03-15 PROCEDURE — 94761 N-INVAS EAR/PLS OXIMETRY MLT: CPT

## 2022-03-15 PROCEDURE — 78452 HT MUSCLE IMAGE SPECT MULT: CPT

## 2022-03-15 PROCEDURE — 93306 TTE W/DOPPLER COMPLETE: CPT

## 2022-03-15 PROCEDURE — 97161 PT EVAL LOW COMPLEX 20 MIN: CPT

## 2022-03-15 PROCEDURE — 82962 GLUCOSE BLOOD TEST: CPT

## 2022-03-15 PROCEDURE — 74011250637 HC RX REV CODE- 250/637: Performed by: INTERNAL MEDICINE

## 2022-03-15 PROCEDURE — 97530 THERAPEUTIC ACTIVITIES: CPT

## 2022-03-15 PROCEDURE — 74011000250 HC RX REV CODE- 250: Performed by: INTERNAL MEDICINE

## 2022-03-15 PROCEDURE — G0378 HOSPITAL OBSERVATION PER HR: HCPCS

## 2022-03-15 PROCEDURE — 74011250636 HC RX REV CODE- 250/636: Performed by: INTERNAL MEDICINE

## 2022-03-15 PROCEDURE — 96372 THER/PROPH/DIAG INJ SC/IM: CPT

## 2022-03-15 PROCEDURE — 96375 TX/PRO/DX INJ NEW DRUG ADDON: CPT

## 2022-03-15 PROCEDURE — 94760 N-INVAS EAR/PLS OXIMETRY 1: CPT

## 2022-03-15 PROCEDURE — A9500 TC99M SESTAMIBI: HCPCS

## 2022-03-15 RX ORDER — TETRAKIS(2-METHOXYISOBUTYLISOCYANIDE)COPPER(I) TETRAFLUOROBORATE 1 MG/ML
11 INJECTION, POWDER, LYOPHILIZED, FOR SOLUTION INTRAVENOUS
Status: COMPLETED | OUTPATIENT
Start: 2022-03-15 | End: 2022-03-15

## 2022-03-15 RX ORDER — TETRAKIS(2-METHOXYISOBUTYLISOCYANIDE)COPPER(I) TETRAFLUOROBORATE 1 MG/ML
31.6 INJECTION, POWDER, LYOPHILIZED, FOR SOLUTION INTRAVENOUS
Status: COMPLETED | OUTPATIENT
Start: 2022-03-15 | End: 2022-03-15

## 2022-03-15 RX ORDER — SODIUM CHLORIDE 0.9 % (FLUSH) 0.9 %
20 SYRINGE (ML) INJECTION
Status: COMPLETED | OUTPATIENT
Start: 2022-03-15 | End: 2022-03-15

## 2022-03-15 RX ADMIN — GUAIFENESIN 600 MG: 600 TABLET, EXTENDED RELEASE ORAL at 08:48

## 2022-03-15 RX ADMIN — CLOPIDOGREL BISULFATE 75 MG: 75 TABLET ORAL at 08:48

## 2022-03-15 RX ADMIN — SODIUM CHLORIDE, PRESERVATIVE FREE 10 ML: 5 INJECTION INTRAVENOUS at 07:00

## 2022-03-15 RX ADMIN — TETRAKIS(2-METHOXYISOBUTYLISOCYANIDE)COPPER(I) TETRAFLUOROBORATE 11 MILLICURIE: 1 INJECTION, POWDER, LYOPHILIZED, FOR SOLUTION INTRAVENOUS at 08:25

## 2022-03-15 RX ADMIN — HEPARIN SODIUM 5000 UNITS: 5000 INJECTION, SOLUTION INTRAVENOUS; SUBCUTANEOUS at 05:30

## 2022-03-15 RX ADMIN — PANTOPRAZOLE SODIUM 40 MG: 40 TABLET, DELAYED RELEASE ORAL at 05:30

## 2022-03-15 RX ADMIN — TETRAKIS(2-METHOXYISOBUTYLISOCYANIDE)COPPER(I) TETRAFLUOROBORATE 31.6 MILLICURIE: 1 INJECTION, POWDER, LYOPHILIZED, FOR SOLUTION INTRAVENOUS at 11:00

## 2022-03-15 RX ADMIN — MORPHINE SULFATE 2 MG: 2 INJECTION, SOLUTION INTRAMUSCULAR; INTRAVENOUS at 05:44

## 2022-03-15 RX ADMIN — REGADENOSON 0.4 MG: 0.08 INJECTION, SOLUTION INTRAVENOUS at 10:59

## 2022-03-15 RX ADMIN — LISINOPRIL 5 MG: 5 TABLET ORAL at 08:48

## 2022-03-15 RX ADMIN — LAMOTRIGINE 25 MG: 25 TABLET ORAL at 08:48

## 2022-03-15 RX ADMIN — AMLODIPINE BESYLATE 5 MG: 5 TABLET ORAL at 08:48

## 2022-03-15 RX ADMIN — Medication 20 ML: at 10:59

## 2022-03-15 RX ADMIN — CETIRIZINE HYDROCHLORIDE 10 MG: 10 TABLET, FILM COATED ORAL at 08:48

## 2022-03-15 RX ADMIN — ASPIRIN 81 MG: 81 TABLET, COATED ORAL at 08:48

## 2022-03-15 RX ADMIN — SODIUM CHLORIDE, POTASSIUM CHLORIDE, SODIUM LACTATE AND CALCIUM CHLORIDE 100 ML/HR: 600; 310; 30; 20 INJECTION, SOLUTION INTRAVENOUS at 07:24

## 2022-03-15 NOTE — PROGRESS NOTES
[facility] Cardiology Progress Note    Date of Service: 3/15/2022    Subjective:  No acute events overnight. Had some right sided chest discomfort at rest that resolved with morphine. Reports epigastric discomfort after drinking a lot of water.     Objective:    Visit Vitals  /79 (BP 1 Location: Left upper arm, BP Patient Position: At rest)   Pulse (!) 56   Temp 98 °F (36.7 °C)   Resp 16   Ht 5' 7\" (1.702 m)   Wt 93.6 kg (206 lb 5.6 oz)   SpO2 95%   BMI 32.32 kg/m²       No intake or output data in the 24 hours ending 03/15/22 0901     Physical Exam  GEN: NAD, appears stated age  [de-identified]: EOMI, MMM, OP clear  NECK: Normal JVP  CV: RRR, normal S1 and S2, no M/R/G  LUNGS: CTAB, no W/R/R  ABD: NABS, soft, NT/ND  EXT: No edema, 2+ distal pulses  PSYCH: Mood and affect normal  NEURO: AAO, MAEW, face symmetrical, speech intact    Current Facility-Administered Medications   Medication Dose Route Frequency    regadenoson (LEXISCAN) injection 0.4 mg  0.4 mg IntraVENous RAD ONCE    saline peripheral flush soln 20 mL  20 mL InterCATHeter RAD ONCE    amLODIPine (NORVASC) tablet 5 mg  5 mg Oral DAILY    aspirin delayed-release tablet 81 mg  81 mg Oral DAILY    carvediloL (COREG) tablet 3.125 mg  3.125 mg Oral BID    cetirizine (ZYRTEC) tablet 10 mg  10 mg Oral DAILY    clopidogreL (PLAVIX) tablet 75 mg  75 mg Oral DAILY    guaiFENesin ER (MUCINEX) tablet 600 mg  600 mg Oral BID    lamoTRIgine (LaMICtal) tablet 25 mg  25 mg Oral BID    lisinopriL (PRINIVIL, ZESTRIL) tablet 5 mg  5 mg Oral DAILY    nitroglycerin (NITROSTAT) tablet 0.4 mg  0.4 mg SubLINGual Q5MIN PRN    pantoprazole (PROTONIX) tablet 40 mg  40 mg Oral ACB    rosuvastatin (CRESTOR) tablet 10 mg  10 mg Oral QHS    sodium chloride (NS) flush 5-40 mL  5-40 mL IntraVENous Q8H    sodium chloride (NS) flush 5-40 mL  5-40 mL IntraVENous PRN    acetaminophen (TYLENOL) tablet 650 mg  650 mg Oral Q6H PRN    Or    acetaminophen (TYLENOL) suppository 650 mg 650 mg Rectal Q6H PRN    polyethylene glycol (MIRALAX) packet 17 g  17 g Oral DAILY PRN    ondansetron (ZOFRAN ODT) tablet 4 mg  4 mg Oral Q8H PRN    Or    ondansetron (ZOFRAN) injection 4 mg  4 mg IntraVENous Q6H PRN    heparin (porcine) injection 5,000 Units  5,000 Units SubCUTAneous Q8H    insulin lispro (HUMALOG) injection   SubCUTAneous AC&HS    glucose chewable tablet 16 g  4 Tablet Oral PRN    glucagon (GLUCAGEN) injection 1 mg  1 mg IntraMUSCular PRN    dextrose 10 % infusion 0-250 mL  0-250 mL IntraVENous PRN    morphine injection 2 mg  2 mg IntraVENous Q4H PRN    lactated Ringers infusion  100 mL/hr IntraVENous CONTINUOUS       Data Reviewed:  Recent Labs     03/14/22  0808 03/13/22 2120    140   K 4.1 4.3   * 108   CO2 25 29   GLU 95 100   BUN 15 17   CREA 1.31* 1.63*   CA 8.7 8.8   MG 1.8  --    PHOS 3.4  --    ALB 3.4* 3.7   ALT 16 19     Recent Labs     03/14/22  0808 03/13/22 2120   WBC 4.9 5.3   HGB 12.6 13.0   HCT 42.7 44.3   * 164     No results found for: SDES  No results found for: CULT    All Cardiac Markers in the last 24 hours:  No results found for: CPK, CK, CKMMB, CKMB, RCK3, CKMBT, CKMBPOC, CKNDX, CKND1, ELIUD, TROPT, TROIQ, PATRIZIA, TROPT, TNIPOC, BNP, BNPP, BNPNT    Telemetry (personally reviewed): Sinus rhythm, occasional PACs    Assessment:  1. Chest pain   - HS troponin trend reassuring   - Contrast dye allergy  2. Syncope  3. CAD s/p prior PCI  4. HTN  5. DM2  6. RODRGIUEZ  7. GIO   - Improved with IVF administration    Plan:  - Moo Mai MPI today; if low risk, ok to discharge home with follow-up with primary cardiologist, Dr. Grace Terry ([address] to discharge prior to echo being completed if clinically stable; this can be done as an outpatient as necessary; no arrhythmic etiology for syncope determined on telemetry; likely related to orthostasis  - Continue outpatient CV medications    ADDENDUM:  Moo Mai MPI as follows.   Ok to discharge to home with outpatient cardiology follow-up with Dr. Bryce Brown. ECG: Stress ECG was negative for ischemia.   Stress Test: A pharmacological stress test was performed using lexiscan. Blood pressure demonstrated a normal response and heart rate demonstrated a normal response to stress. The patient's heart rate recovery was normal. The patient reported dyspnea, no chest pain and right shoulder pain during the stress test.    MPI: No ischemia or infarct demonstrated. LVEF 59%.     Rest and Lexiscan myocardial perfusion SPECT imaging is performed with IV injections of 11.0 and 31.6 mCi technetium 99m Sestamibi respectively.     SPECT quantitative perfusion analysis and images displayed in three planes demonstrate uniform radiotracer uptake in the myocardium on both sequences. There is no ischemic reversibility. There is no apparent infarct fixed defect.      Gated SPECT quantitative analysis and images reviewed in 3 planes exhibit appropriate LV systolic wall thickening. There is no segmental wall motion abnormality of the left ventricular myocardium. The calculated LV ejection fraction is 59%. Pulmonary uptake and left ventricular cavity size appear normal.        IMPRESSION: No ischemia or infarct demonstrated. LVEF 59%. Thank you for the opportunity to participate in the care of Samuel Lay and please do not hesitate to contact us should you have any questions. Signed:  Jigar Boland.  Funmi Harp, [address] [place] [address] [facility]  03/15/22

## 2022-03-15 NOTE — PROGRESS NOTES
Order received, chart reviewed, patient evaluated and discharged by physical therapy. Recommend:  No skilled physical therapy/ follow up rehabilitation needs identified at this time. Educated pt in the role of OP PT for vestibular rehab if vertigo symptoms return and that he follow up with his community MD for OP referral.     Full evaluation to follow.        Vitals:    03/15/22 1500 03/15/22 1504 03/15/22 1507 03/15/22 1516   BP: 120/69 131/77 (!) 145/89 131/83   BP 1 Location:       BP Patient Position: Supine Sitting Standing Sitting  Comment: after walking 250 ft   Pulse: (!) 59 66 65 60

## 2022-03-15 NOTE — PROGRESS NOTES
Attempted to schedule hospital follow up PCP appointment. Awaiting call back from the office with appointment information. Colletta Link, Care Management Assistant. Received call back. Hospital follow-up PCP transitional care appointment has been scheduled with Lucrecia Sun NP for Monday, 3/21/22 at 11:15 a.m. Pending patient discharge. Colletta Link, Care Management .

## 2022-03-15 NOTE — DISCHARGE INSTRUCTIONS

## 2022-03-15 NOTE — DISCHARGE SUMMARY
Discharge Summary       PATIENT ID: Bailey Wong  MRN: [id number]   YOB: 1955    DATE OF ADMISSION: 3/13/2022 10:14 PM    DATE OF DISCHARGE: 03/15/22   PRIMARY CARE PROVIDER: Hannah Barros NP     ATTENDING PHYSICIAN: Oksana Duque MD  DISCHARGING PROVIDER: Oksana Duque MD    To contact this individual call [phone] and ask the  to page. If unavailable ask to be transferred the Adult Hospitalist Department.     CONSULTATIONS: IP CONSULT TO CARDIOLOGY    PROCEDURES/SURGERIES: * No surgery found *    ADMITTING DIAGNOSES & HOSPITAL COURSE:   HPI:  \"This is a 68-year-old man with a past medical history significant for coronary artery disease, status post stent placement, hypertension, type 2 diabetes, dyslipidemia, obstructive sleep apnea, stroke, who was in his usual state of health until the day of presentation at the emergency room when the patient developed chest pain.  The chest pain initially started on the right side of the chest, radiated to the left side of the chest, lasted for about 3-4 minutes associated with shortness of breath.  The chest pain was described as pleuritic with no known aggravating or relieving factors, 6/10 in severity.  As a result of the chest pain, the patient stated that he felt dizzy, did not describe the dizziness as room spinning around him and subsequently passed out twice before coming to the emergency room.  When the patient arrived at the emergency room, his troponin was within normal limits, but because the patient has established coronary artery disease and underwent stent placement, this  for similar presentation.  The patient was referred to the hospitalist service for evaluation for admission.  Currently, the patient's cardiologist is at [address] [place] [address] Road was last admitted to this hospital from 02/04/2017 to 02/07/2017.  The patient was admitted for evaluation and treatment of acute GI bleed as well as acute sigmoid diverticulitis.  The patient has no associated fever, rigors, or chills. \"      Patient was evaluated for chest pain and syncope. Troponins trended negative. Lipase was mildly elevated follow-up with a CT abdomen pelvis with no minutes of pancreatitis. Cardiology evaluated patient underwent nuclear stress test which was negative. Orthostats negative however patient GIO improved with IV fluids. CT head Noncon was also negative. MRI MRI of the brain showed no evidence of acute pathology. Carotid Dopplers showed no significant stenosis. Telemetry showed no arrhythmia,  appreciate cardiology recommendations can follow-up outpatient with [address] [place] [address] cardiologist within the next week no need to repeat echo here. To follow-up with PCP, cardiology outpatient. He can also follow-up with vascular for his stents of the upper extremities at [address] [place] [address]. DC home today. DISCHARGE DIAGNOSES / PLAN:      Chest pain  Ddx:  it initially was on right side but is radiating to the left side of his chest given his history of coronary disease with stents still high risk despite the negative troponins. Lipase also mildly elevated   -Cardiology consulted> nuclear stress test completed today negative  -Tropes negative x2  -Follow-up urine drug screen, D-dimer, ethanol level  -Lipase is mildly elevated > CT A&P no evidence of pancreatitis       Syncope. - Unclear likely secondary to hypovolemia,    Orthostats were neg  CT Noncon negative  -keep on tele   MRI/MRA head neg, carotid dopplers neg       Hypertension. Continue home medication     Type 2 diabetes.   Follow-up A1c, sliding scale with insulin coverage.        Acute kidney injury, most likely due to volume depletion.  - cont w/ IVF > creatinine downtrending, baseline Cr around 1.2 on chart review in 2017     Obstructive sleep apnea.  We will place the patient on CPAP at bedtime.     Code status: Full  DVT prophylaxis: Heparin subcu     Care Plan discussed with: Patient/Family and Nurse  Anticipated Disposition: Home w/Family  Anticipated Discharge: 24 hours to 48 hours           FOLLOW UP APPOINTMENTS:    Follow-up Information     Follow up With Specialties Details Why Contact Info    Kevyn Mayfield NP Nurse Practitioner In 3 days  [address]      Luiza Hernandez MD Cardiology In 1 week  [address]  [phone]             ADDITIONAL CARE RECOMMENDATIONS: Repeat CBC, BMP 3 to 5 days    DIET: Resume previous diet, increase hydration daily     ACTIVITY: Activity as tolerated      DISCHARGE MEDICATIONS:  Current Discharge Medication List      CONTINUE these medications which have NOT CHANGED    Details   lamoTRIgine (LaMICtal) 25 mg tablet TAKE 1 TABLET TWICE DAILY  Qty: 180 Tablet, Refills: 4      cetirizine (ZYRTEC) 10 mg tablet Take 10 mg by mouth daily. amLODIPine (NORVASC) 5 mg tablet Take 5 mg by mouth daily. metFORMIN (GLUCOPHAGE) 500 mg tablet Take 500 mg by mouth three (3) times daily (with meals). multivit-min/iron/folic acid/K (ADULTS MULTIVITAMIN PO) Take  by mouth. 50 plus. Takes one po once daily. rosuvastatin (CRESTOR) 10 mg tablet Take 10 mg by mouth nightly. aspirin delayed-release 81 mg tablet TK 1 T PO D  Refills: 3      carvedilol (COREG) 3.125 mg tablet Take 3.125 mg by mouth two (2) times a day. clopidogrel (PLAVIX) 75 mg tab Take 75 mg by mouth daily. lisinopril (PRINIVIL, ZESTRIL) 5 mg tablet Take 5 mg by mouth daily. nitroglycerin (NITROSTAT) 0.4 mg SL tablet 0.4 mg by SubLINGual route every five (5) minutes as needed for Chest Pain. Up to 3 doses. pantoprazole (PROTONIX) 40 mg tablet Take 1 Tab by mouth Daily (before breakfast). Qty: 30 Tab, Refills: 1      guaiFENesin ER (Mucinex) 600 mg ER tablet Take 600 mg by mouth two (2) times a day. NOTIFY YOUR PHYSICIAN FOR ANY OF THE FOLLOWING:   Fever over 101 degrees for 24 hours.    Chest pain, shortness of breath, fever, chills, nausea, vomiting, diarrhea, change in mentation, falling, weakness, bleeding. Severe pain or pain not relieved by medications. Or, any other signs or symptoms that you may have questions about. DISPOSITION:    Home With:   OT  PT  HH  RN       Long term SNF/Inpatient Rehab   x Independent/assisted living    Hospice    Other:       PATIENT CONDITION AT DISCHARGE:     Functional status    Poor     Deconditioned    x Independent      Cognition    x Lucid     Forgetful     Dementia        Code status   x  Full code     DNR      PHYSICAL EXAMINATION AT DISCHARGE:    I had a face to face encounter with this patient and independently examined them on 3/15/2022 as outlined below:                                                   Constitutional:  No acute distress, cooperative, pleasant    ENT:  Oral mucosa moist, oropharynx benign. Resp:  CTA bilaterally. No wheezing/rhonchi/rales. No accessory muscle use   CV:  Regular rhythm, normal rate, no murmurs, gallops, rubs    GI:  Soft, non distended, non tender. normoactive bowel sounds, no hepatosplenomegaly     Musculoskeletal:  No edema, warm, 2+ pulses throughout    Neurologic:  Moves all extremities 4/5 strength left lower extremity sensation intact bilateral upper extremities lower extremity.   AAOx3, CN II-XII reviewed           CHRONIC MEDICAL DIAGNOSES:  Problem List as of 3/15/2022 Date Reviewed: 3/14/2022          Codes Class Noted - Resolved    * (Principal) Chest pain ICD-10-CM: R07.9  ICD-9-CM: 786.50  3/13/2022 - Present        Hypertension (Chronic) ICD-10-CM: I10  ICD-9-CM: 401.9  2/5/2017 - Present        RESOLVED: Diverticulitis of sigmoid colon ICD-10-CM: K57.32  ICD-9-CM: 562.11  2/5/2017 - 2/6/2017        RESOLVED: GI bleed ICD-10-CM: K92.2  ICD-9-CM: 578.9  2/4/2017 - 2/6/2017        RESOLVED: GI bleed ICD-10-CM: K92.2  ICD-9-CM: 578.9  7/16/2016 - 7/17/2016        RESOLVED: Lower GI bleed ICD-10-CM: K92.2  ICD-9-CM: 578.9  4/7/2016 - 7/18/2016              Greater than 30  minutes were spent with the patient on counseling and coordination of care    Signed:   Zabrina Mayer MD  3/15/2022  12:47 PM

## 2022-03-15 NOTE — PROGRESS NOTES
Discharge instructions reviewed with patient, provided education material on dietary changes with coronary artery disease. Pt's son will pick him up at 1530.

## 2022-03-15 NOTE — PROGRESS NOTES
Problem: Pain  Goal: *Control of Pain  Outcome: Progressing Towards Goal     Problem: Patient Education: Go to Patient Education Activity  Goal: Patient/Family Education  Outcome: Progressing Towards Goal     Problem: Falls - Risk of  Goal: *Absence of Falls  Description: Document Andrew Crowley Fall Risk and appropriate interventions in the flowsheet.   Outcome: Progressing Towards Goal  Note: Fall Risk Interventions:  Mobility Interventions: Communicate number of staff needed for ambulation/transfer         Medication Interventions: Evaluate medications/consider consulting pharmacy                   Problem: Patient Education: Go to Patient Education Activity  Goal: Patient/Family Education  Outcome: Progressing Towards Goal

## 2022-03-15 NOTE — PROGRESS NOTES
Occupational therapy  03/15/22     OT eval order received and acknowledged. OT eval attempted at 9:03 AM however per nursing patient has been injected and prepped for Rosanna Foley, will be going off unit shortly for testing and requesting therapy to come back later. Will continue to follow patient and attempt OT eval at a later time.      Thank you,  Rafael Real, OTR/L

## 2022-03-15 NOTE — PROGRESS NOTES
Problem: Pain  Goal: *Control of Pain  Outcome: Progressing Towards Goal     Problem: Patient Education: Go to Patient Education Activity  Goal: Patient/Family Education  Outcome: Progressing Towards Goal     Problem: Falls - Risk of  Goal: *Absence of Falls  Description: Document Margarita Litter Fall Risk and appropriate interventions in the flowsheet.   Outcome: Progressing Towards Goal  Note: Fall Risk Interventions:  Mobility Interventions: OT consult for ADLs,Patient to call before getting OOB         Medication Interventions: Patient to call before getting OOB,Teach patient to arise slowly

## 2022-03-15 NOTE — PROGRESS NOTES
PHYSICAL THERAPY EVALUATION/DISCHARGE  Patient: Rosa Powell (79 y.o. male)  Date: 3/15/2022  Primary Diagnosis: Chest pain [R07.9]        Precautions: Fall      ASSESSMENT  Based on the objective data described below, the patient presents independent with bed mobility, transfers and ambulation. Pt voiced no c/o dizziness or chest pain w/ activity or position changes. Strength, ROM, and coordination are functional.  He ambulated 250 ft with steady gait. Completed education. No additional acute therapy needs identified. Noted plan for DC home later today. Will sign off. Other factors to consider for discharge:      Further skilled acute physical therapy is not indicated at this time. PLAN :  Recommendation for discharge: (in order for the patient to meet his/her long term goals)  No skilled physical therapy/ follow up rehabilitation needs identified at this time.     This discharge recommendation:  Has not yet been discussed the attending provider and/or case management    IF patient discharges home will need the following DME: none       SUBJECTIVE:       OBJECTIVE DATA SUMMARY:   HISTORY:    Past Medical History:   Diagnosis Date    Arthritis     OSTEO    CAD (coronary artery disease)     2 heart stents/2 stents another time/mild heart attack    Chest pain     Diabetes ([address]     Fainting     Gastrointestinal disorder     acid reflux    GERD (gastroesophageal reflux disease)     History of seasonal allergies     \"HAY FEVER\"    Hypertension     SOB (shortness of breath)     Stroke (HCC)     strokes x 4 - no deficits    Unintentional weight change     Unspecified sleep apnea     USES CPAP/no longer uses CPAP    Vertigo     Visual disturbance      Past Surgical History:   Procedure Laterality Date    COLONOSCOPY N/A 7/1/2021    COLONOSCOPY AND [facility]GD performed by Haleigh [facility] at [facility] END[facility]COPY    HX HEART CATHETERIZATION      x2    HX ORTHOPAEDIC      BOOGIE KNEES x 3 (2 on one knee and once on the other knee) times d/t football injury    HX ORTHOPAEDIC      2014 rt thumb nerve release    HX OTHER SURGICAL      left eye implant     HX OTHER SURGICAL      KIDNEY STONES REMOVED    HX TONSIL AND ADENOIDECTOMY      ID ABDOMEN SURGERY PROC UNLISTED      Brigette Mcdaniels bladder removed 2014       Prior level of function: independent  Personal factors and/or comorbidities impacting plan of care: Eliel Grewal Environment: Apartment ([address] [place] [address])  # Steps to Enter: 0  One/Two Story Residence: One story  # of Interior Steps: 0  Height of Each Step (in): 0 inches  Interior Rails: None  Lift Chair Available: No  Living Alone: Yes  Support Systems: Child(mago)  Patient Expects to be Discharged to[de-identified] Home  Current DME Used/Available at Home: Cane, straight    EXAMINATION/PRESENTATION/DECISION MAKING:   Critical Behavior:  Neurologic State: Alert  Orientation Level: Oriented X4  Cognition: Appropriate decision making,Appropriate for age attention/concentration,Appropriate safety awareness,Follows commands  Safety/Judgement: Awareness of environment  Hearing: Auditory  Auditory Impairment: None      Range Of Motion:  AROM: Within functional limits                       Strength:    Strength: Within functional limits                    Tone & Sensation:   Tone: Normal                              Coordination:  Coordination: Within functional limits  Vision:   Able to track in all planes  Nystagmus - none  Functional Mobility:  Bed Mobility:  Rolling: Independent  Supine to Sit: Independent  Sit to Supine: Independent  Scooting: Independent  Transfers:  Sit to Stand: Independent  Stand to Sit: Independent                       Balance:   Sitting: Intact; Without support  Standing: Intact; Without support  Ambulation/Gait Training:  Distance (ft): 250 Feet (ft)  Assistive Device: Gait belt  Level of Assistance: Independent     Gait Description (WDL): Exceptions to [facility] Therapeutic Activity/ Education:    - Fall prevention strategies in the event of dizziness   - Educated in role of outpatient PT for vestibular rehab/ assessment. Recommended pt discuss with his community MD.         Physical Therapy Evaluation Charge Determination   History Examination Presentation Decision-Making   LOW Complexity : Zero comorbidities / personal factors that will impact the outcome / POC LOW Complexity : 1-2 Standardized tests and measures addressing body structure, function, activity limitation and / or participation in recreation  LOW Complexity : Stable, uncomplicated        Based on the above components, the patient evaluation is determined to be of the following complexity level: LOW     Pain Rating:  None indicated    Activity Tolerance:   Good      After treatment patient left in no apparent distress:   Sitting EOB, call bell w/in reach    COMMUNICATION/EDUCATION:   The patients plan of care was discussed with: Registered nurse. Fall prevention education was provided and the patien indicated understanding. Patient participated in plan of care and in agreement.       Thank you for this referral.  Naida Cabral, PT   Time Calculation: 25 mins

## 2022-03-15 NOTE — PROGRESS NOTES
Bedside and Verbal shift change report given to Lisa Lamar (oncoming nurse) by Mojgan Cameron (offgoing nurse). Report included the following information SBAR, Kardex, ED Summary, Intake/Output, MAR, Recent Results and Cardiac Rhythm NSR/Sinus Taran Room.

## 2022-03-15 NOTE — PROGRESS NOTES
Problem: Pain  Goal: *Control of Pain  3/15/2022 1244 by Jermaine Laura RN  Outcome: Resolved/Met  3/15/2022 1052 by Jermaine Laura RN  Outcome: Progressing Towards Goal     Problem: Patient Education: Go to Patient Education Activity  Goal: Patient/Family Education  3/15/2022 1244 by Jermaine Laura RN  Outcome: Resolved/Met  3/15/2022 1052 by Jermaine Laura RN  Outcome: Progressing Towards Goal     Problem: Falls - Risk of  Goal: *Absence of Falls  Description: Document Lettie Duverney Fall Risk and appropriate interventions in the flowsheet.   3/15/2022 1244 by Jermaine Laura RN  Outcome: Resolved/Met  3/15/2022 1052 by Jermaine Laura RN  Outcome: Progressing Towards Goal  Note: Fall Risk Interventions:  Mobility Interventions: Communicate number of staff needed for ambulation/transfer         Medication Interventions: Evaluate medications/consider consulting pharmacy                   Problem: Patient Education: Go to Patient Education Activity  Goal: Patient/Family Education  3/15/2022 1244 by Jermaine Laura RN  Outcome: Resolved/Met  3/15/2022 1052 by Jermaine Laura RN  Outcome: Progressing Towards Goal

## 2022-03-19 PROBLEM — R07.9 CHEST PAIN: Status: ACTIVE | Noted: 2022-03-13

## 2022-03-19 PROBLEM — I10 HYPERTENSION: Status: ACTIVE | Noted: 2017-02-05

## 2022-06-08 RX ORDER — LAMOTRIGINE 25 MG/1
TABLET ORAL
Qty: 30 TABLET | Refills: 3 | Status: SHIPPED | OUTPATIENT
Start: 2022-06-08 | End: 2022-09-23

## 2022-06-25 ENCOUNTER — APPOINTMENT (OUTPATIENT)
Dept: CT IMAGING | Age: 67
End: 2022-06-25
Attending: EMERGENCY MEDICINE
Payer: MEDICARE

## 2022-06-25 ENCOUNTER — HOSPITAL ENCOUNTER (EMERGENCY)
Age: 67
Discharge: HOME | End: 2022-06-25
Attending: EMERGENCY MEDICINE | Admitting: EMERGENCY MEDICINE
Payer: MEDICARE

## 2022-06-25 VITALS
OXYGEN SATURATION: 98 % | HEIGHT: 68 IN | RESPIRATION RATE: 16 BRPM | WEIGHT: 203 LBS | TEMPERATURE: 98.6 F | BODY MASS INDEX: 30.77 KG/M2 | DIASTOLIC BLOOD PRESSURE: 67 MMHG | HEART RATE: 56 BPM | SYSTOLIC BLOOD PRESSURE: 122 MMHG

## 2022-06-25 DIAGNOSIS — K62.5 RECTAL BLEEDING: ICD-10-CM

## 2022-06-25 DIAGNOSIS — R10.84 ABDOMINAL PAIN, GENERALIZED: Primary | ICD-10-CM

## 2022-06-25 LAB
ABO + RH BLD: NORMAL
ALBUMIN SERPL-MCNC: 3.5 G/DL (ref 3.5–5)
ALBUMIN/GLOB SERPL: 0.9 {RATIO} (ref 1.1–2.2)
ALP SERPL-CCNC: 75 U/L (ref 45–117)
ALT SERPL-CCNC: 18 U/L (ref 12–78)
ANION GAP SERPL CALC-SCNC: 3 MMOL/L (ref 5–15)
AST SERPL-CCNC: 12 U/L (ref 15–37)
BASOPHILS # BLD: 0.1 K/UL (ref 0–0.1)
BASOPHILS NFR BLD: 1 % (ref 0–1)
BILIRUB SERPL-MCNC: 0.4 MG/DL (ref 0.2–1)
BLOOD GROUP ANTIBODIES SERPL: NORMAL
BUN SERPL-MCNC: 14 MG/DL (ref 6–20)
BUN/CREAT SERPL: 10 (ref 12–20)
CALCIUM SERPL-MCNC: 8.9 MG/DL (ref 8.5–10.1)
CHLORIDE SERPL-SCNC: 107 MMOL/L (ref 97–108)
CO2 SERPL-SCNC: 29 MMOL/L (ref 21–32)
COMMENT, HOLDF: NORMAL
CREAT SERPL-MCNC: 1.37 MG/DL (ref 0.7–1.3)
DIFFERENTIAL METHOD BLD: ABNORMAL
EOSINOPHIL # BLD: 0.1 K/UL (ref 0–0.4)
EOSINOPHIL NFR BLD: 2 % (ref 0–7)
ERYTHROCYTE [DISTWIDTH] IN BLOOD BY AUTOMATED COUNT: 16.3 % (ref 11.5–14.5)
GLOBULIN SER CALC-MCNC: 3.8 G/DL (ref 2–4)
GLUCOSE SERPL-MCNC: 94 MG/DL (ref 65–100)
HCT VFR BLD AUTO: 45.9 % (ref 36.6–50.3)
HGB BLD-MCNC: 14.4 G/DL (ref 12.1–17)
IMM GRANULOCYTES # BLD AUTO: 0 K/UL (ref 0–0.04)
IMM GRANULOCYTES NFR BLD AUTO: 0 % (ref 0–0.5)
INR PPP: 1 (ref 0.9–1.1)
LYMPHOCYTES # BLD: 1.6 K/UL (ref 0.8–3.5)
LYMPHOCYTES NFR BLD: 26 % (ref 12–49)
MAGNESIUM SERPL-MCNC: 2 MG/DL (ref 1.6–2.4)
MCH RBC QN AUTO: 27 PG (ref 26–34)
MCHC RBC AUTO-ENTMCNC: 31.4 G/DL (ref 30–36.5)
MCV RBC AUTO: 86 FL (ref 80–99)
MONOCYTES # BLD: 0.4 K/UL (ref 0–1)
MONOCYTES NFR BLD: 7 % (ref 5–13)
NEUTS SEG # BLD: 4.1 K/UL (ref 1.8–8)
NEUTS SEG NFR BLD: 64 % (ref 32–75)
NRBC # BLD: 0 K/UL (ref 0–0.01)
NRBC BLD-RTO: 0 PER 100 WBC
PLATELET # BLD AUTO: 179 K/UL (ref 150–400)
PMV BLD AUTO: 9.6 FL (ref 8.9–12.9)
POTASSIUM SERPL-SCNC: 4.4 MMOL/L (ref 3.5–5.1)
PROT SERPL-MCNC: 7.3 G/DL (ref 6.4–8.2)
PROTHROMBIN TIME: 10.9 SEC (ref 9–11.1)
RBC # BLD AUTO: 5.34 M/UL (ref 4.1–5.7)
SAMPLES BEING HELD,HOLD: NORMAL
SODIUM SERPL-SCNC: 139 MMOL/L (ref 136–145)
SPECIMEN EXP DATE BLD: NORMAL
WBC # BLD AUTO: 6.3 K/UL (ref 4.1–11.1)

## 2022-06-25 PROCEDURE — 85610 PROTHROMBIN TIME: CPT

## 2022-06-25 PROCEDURE — 99284 EMERGENCY DEPT VISIT MOD MDM: CPT

## 2022-06-25 PROCEDURE — 85025 COMPLETE CBC W/AUTO DIFF WBC: CPT

## 2022-06-25 PROCEDURE — 80053 COMPREHEN METABOLIC PANEL: CPT

## 2022-06-25 PROCEDURE — 86900 BLOOD TYPING SEROLOGIC ABO: CPT

## 2022-06-25 PROCEDURE — 74176 CT ABD & PELVIS W/O CONTRAST: CPT

## 2022-06-25 PROCEDURE — 36415 COLL VENOUS BLD VENIPUNCTURE: CPT

## 2022-06-25 PROCEDURE — 83735 ASSAY OF MAGNESIUM: CPT

## 2022-06-25 PROCEDURE — 74011250637 HC RX REV CODE- 250/637: Performed by: EMERGENCY MEDICINE

## 2022-06-25 RX ORDER — DICYCLOMINE HYDROCHLORIDE 20 MG/1
20 TABLET ORAL
Status: COMPLETED | OUTPATIENT
Start: 2022-06-25 | End: 2022-06-25

## 2022-06-25 RX ORDER — TAMSULOSIN HYDROCHLORIDE 0.4 MG/1
0.4 CAPSULE ORAL DAILY
COMMUNITY
Start: 2022-03-22

## 2022-06-25 RX ADMIN — DICYCLOMINE HYDROCHLORIDE 20 MG: 20 TABLET ORAL at 19:33

## 2022-06-25 NOTE — ED TRIAGE NOTES
Pt arrives for 3 episodes of dark red blood in the toilet, all were large amount, PMH of hemorrhoids seen and treated earlier this year. Patient reports some intermittent dizziness today. Pt has 2 cardiac stents but denies blood thinners, after chart review patient takes plavix daily. a&ox4.

## 2022-06-25 NOTE — ED PROVIDER NOTES
62-year-old male presents from home with complaint of abdominal pain and rectal bleeding. Patient states has had 3 episodes of bowel movement so far today each 1 with some stool mixed with red blood. He also reports some lower abdominal pain. No exacerbating or alleviating factors. No nausea or vomiting, fever, urinary symptoms. He has had similar symptoms in the past attributed to hemorrhoid however he denies any pain at this time. Past medical history is also significant for coronary artery disease, GERD, hypertension, stroke with no deficits. Patient is on Plavix.            Past Medical History:   Diagnosis Date    Arthritis     OSTEO    CAD (coronary artery disease)     2 heart stents/2 stents another time/mild heart attack    Chest pain     Diabetes ([address]     Fainting     Gastrointestinal disorder     acid reflux    GERD (gastroesophageal reflux disease)     History of seasonal allergies     \"HAY FEVER\"    Hypertension     SOB (shortness of breath)     Stroke ([facility])     strokes x 4 - no deficits    Unintentional weight change     Unspecified sleep apnea     USES CPAP/no longer uses CPAP    Vertigo     Visual disturbance        Past Surgical History:   Procedure Laterality Date    COLONOSCOPY N/A 7/1/2021    COLONOSCOPY AND EGD performed by Milena Carbajal MD at [facility] ENDOSCOPY    HX HEART CATHETERIZATION      x2    HX ORTHOPAEDIC      BOOGIE KNEES x 3 (2 on one knee and once on the other knee) times d/t football injury    HX ORTHOPAEDIC      2014 rt thumb nerve release    HX OTHER SURGICAL      left eye implant     HX OTHER SURGICAL      KIDNEY STONES REMOVED    HX TONSIL AND ADENOIDECTOMY      SD ABDOMEN SURGERY PROC UNLISTED      Sarah Novel bladder removed 2014         Family History:   Problem Relation Age of Onset    Diabetes Mother     Cancer Father         THROAT    Diabetes Sister     Diabetes Maternal Aunt     Alzheimer's Disease Maternal Uncle     Diabetes Maternal Aunt Social History     Socioeconomic History    Marital status: SINGLE     Spouse name: Not on file    Number of children: Not on file    Years of education: Not on file    Highest education level: Not on file   Occupational History    Not on file   Tobacco Use    Smoking status: Never Smoker    Smokeless tobacco: Never Used   Vaping Use    Vaping Use: Never used   Substance and Sexual Activity    Alcohol use: No    Drug use: No    Sexual activity: Not on file   Other Topics Concern    Not on file   Social History Narrative    Not on file     Social Determinants of Health     Financial Resource Strain:     Difficulty of Paying Living Expenses: Not on file   Food Insecurity:     Worried About Running Out of Food in the Last Year: Not on file    Varinder of Food in the Last Year: Not on file   Transportation Needs:     Lack of Transportation (Medical): Not on file    Lack of Transportation (Non-Medical): Not on file   Physical Activity:     Days of Exercise per Week: Not on file    Minutes of Exercise per Session: Not on file   Stress:     Feeling of Stress : Not on file   Social Connections:     Frequency of Communication with Friends and Family: Not on file    Frequency of Social Gatherings with Friends and Family: Not on file    Attends Religion Services: Not on file    Active Member of [address] [place] [address] [employer] or Organizations: Not on file    Attends Club or Organization Meetings: Not on file    Marital Status: Not on file   Intimate Partner Violence:     Fear of Current or Ex-Partner: Not on file    Emotionally Abused: Not on file    Physically Abused: Not on file    Sexually Abused: Not on file   Housing Stability:     Unable to Pay for Housing in the Last Year: Not on file    Number of [place] in the Last Year: Not on file    Unstable Housing in the Last Year: Not on file         ALLERGIES: Contrast agent [iodine]    Review of Systems   Constitutional: Negative for diaphoresis and fever. HENT: Negative for facial swelling. Eyes: Negative for visual disturbance. Respiratory: Negative for cough. Cardiovascular: Negative for chest pain. Gastrointestinal: Positive for blood in stool. Negative for abdominal pain. Genitourinary: Negative for dysuria. Musculoskeletal: Negative for joint swelling. Skin: Negative for rash. Neurological: Negative for headaches. Hematological: Negative for adenopathy. Psychiatric/Behavioral: Negative for suicidal ideas. Vitals:    06/25/22 1723 06/25/22 1828   BP: 122/85 120/79   Pulse: 61 (!) 57   Resp: 18 18   Temp: 98.2 °F (36.8 °C) 98.7 °F (37.1 °C)   SpO2: 98% 95%   Weight: 92.1 kg (203 lb)    Height: 5' 7.5\" (1.715 m)             Physical Exam  Vitals and nursing note reviewed. Constitutional:       General: He is not in acute distress. Appearance: He is well-developed. HENT:      Head: Normocephalic and atraumatic. Eyes:      Pupils: Pupils are equal, round, and reactive to light. Cardiovascular:      Rate and Rhythm: Normal rate. Pulmonary:      Effort: Pulmonary effort is normal. No respiratory distress. Abdominal:      General: There is no distension. Musculoskeletal:         General: Normal range of motion. Cervical back: Normal range of motion and neck supple. Skin:     General: Skin is warm and dry. Neurological:      Mental Status: He is alert and oriented to person, place, and time. MDM  Number of Diagnoses or Management Options  Abdominal pain, generalized  Rectal bleeding  Diagnosis management comments: 's.Patient's vital signs are normal.  His hemoglobin was stable at 14. CT scan was unremarkable. He had no further episodes of bowel movements or bleeding in the emergency department. I think he stable for discharge home. He might have a bleeding internal hemorrhoid or diverticular. I think he stable to go home and follow-up with gastroenterology.   He can return to the ER if he has increased bleeding or any new and concerning complaints.        Amount and/or Complexity of Data Reviewed  Clinical lab tests: reviewed  Tests in the radiology section of CPT®: reviewed           Procedures

## 2022-09-23 RX ORDER — LAMOTRIGINE 25 MG/1
TABLET ORAL
Qty: 30 TABLET | Refills: 4 | Status: SHIPPED | OUTPATIENT
Start: 2022-09-23

## 2022-11-25 RX ORDER — LAMOTRIGINE 25 MG/1
25 TABLET ORAL
Qty: 30 TABLET | Refills: 11 | Status: SHIPPED | OUTPATIENT
Start: 2022-11-25

## 2024-03-18 ENCOUNTER — HOSPITAL ENCOUNTER (INPATIENT)
Facility: HOSPITAL | Age: 69
LOS: 1 days | Discharge: HOME | DRG: 379 | End: 2024-03-21
Attending: EMERGENCY MEDICINE | Admitting: INTERNAL MEDICINE
Payer: MEDICARE

## 2024-03-18 DIAGNOSIS — K92.2 LOWER GI BLEED: Primary | ICD-10-CM

## 2024-03-18 DIAGNOSIS — N20.0 KIDNEY STONE: ICD-10-CM

## 2024-03-18 PROBLEM — R07.9 CHEST PAIN: Status: RESOLVED | Noted: 2022-03-13 | Resolved: 2024-03-18

## 2024-03-18 PROBLEM — I25.10 CAD (CORONARY ARTERY DISEASE): Status: ACTIVE | Noted: 2024-03-18

## 2024-03-18 PROBLEM — Z86.73 HX OF COMPLETED STROKE: Status: ACTIVE | Noted: 2024-03-18

## 2024-03-18 PROBLEM — E11.59 DM TYPE 2 CAUSING VASCULAR DISEASE (HCC): Status: ACTIVE | Noted: 2024-03-18

## 2024-03-18 PROBLEM — K64.9 BLEEDING HEMORRHOIDS: Status: ACTIVE | Noted: 2024-03-18

## 2024-03-18 PROBLEM — K62.5 BRBPR (BRIGHT RED BLOOD PER RECTUM): Status: ACTIVE | Noted: 2024-03-18

## 2024-03-18 PROBLEM — N18.30 CKD (CHRONIC KIDNEY DISEASE), STAGE III (HCC): Status: ACTIVE | Noted: 2024-03-18

## 2024-03-18 PROBLEM — K21.9 GERD (GASTROESOPHAGEAL REFLUX DISEASE): Status: ACTIVE | Noted: 2024-03-18

## 2024-03-18 PROBLEM — G47.33 OBSTRUCTIVE SLEEP APNEA SYNDROME: Status: ACTIVE | Noted: 2024-03-18

## 2024-03-18 PROBLEM — N40.0 BPH (BENIGN PROSTATIC HYPERPLASIA): Status: ACTIVE | Noted: 2024-03-18

## 2024-03-18 PROBLEM — M19.90 ARTHRITIS: Status: ACTIVE | Noted: 2024-03-18

## 2024-03-18 PROBLEM — R42 VERTIGO: Status: RESOLVED | Noted: 2024-03-18 | Resolved: 2024-03-18

## 2024-03-18 PROBLEM — E11.29 DM TYPE 2 CAUSING RENAL DISEASE (HCC): Status: ACTIVE | Noted: 2024-03-18

## 2024-03-18 PROBLEM — R42 VERTIGO: Status: ACTIVE | Noted: 2024-03-18

## 2024-03-18 LAB
ABO + RH BLD: NORMAL
ALBUMIN SERPL-MCNC: 3.4 G/DL (ref 3.5–5)
ALBUMIN/GLOB SERPL: 0.9 (ref 1.1–2.2)
ALP SERPL-CCNC: 72 U/L (ref 45–117)
ALT SERPL-CCNC: 22 U/L (ref 12–78)
AMPHET UR QL SCN: NEGATIVE
ANION GAP SERPL CALC-SCNC: 2 MMOL/L (ref 5–15)
APPEARANCE UR: CLEAR
AST SERPL-CCNC: 16 U/L (ref 15–37)
BARBITURATES UR QL SCN: NEGATIVE
BASOPHILS # BLD: 0.1 K/UL (ref 0–0.1)
BASOPHILS NFR BLD: 1 % (ref 0–1)
BENZODIAZ UR QL: NEGATIVE
BILIRUB SERPL-MCNC: 0.4 MG/DL (ref 0.2–1)
BILIRUB UR QL: NEGATIVE
BLOOD GROUP ANTIBODIES SERPL: NORMAL
BUN SERPL-MCNC: 22 MG/DL (ref 6–20)
BUN/CREAT SERPL: 16 (ref 12–20)
CALCIUM SERPL-MCNC: 8.5 MG/DL (ref 8.5–10.1)
CANNABINOIDS UR QL SCN: NEGATIVE
CHLORIDE SERPL-SCNC: 110 MMOL/L (ref 97–108)
CO2 SERPL-SCNC: 29 MMOL/L (ref 21–32)
COCAINE UR QL SCN: NEGATIVE
COLOR UR: NORMAL
COMMENT:: NORMAL
CREAT SERPL-MCNC: 1.35 MG/DL (ref 0.7–1.3)
DIFFERENTIAL METHOD BLD: NORMAL
EOSINOPHIL # BLD: 0.2 K/UL (ref 0–0.4)
EOSINOPHIL NFR BLD: 3 % (ref 0–7)
ERYTHROCYTE [DISTWIDTH] IN BLOOD BY AUTOMATED COUNT: 13.5 % (ref 11.5–14.5)
ETHANOL SERPL-MCNC: <10 MG/DL (ref 0–0.08)
FERRITIN SERPL-MCNC: 23 NG/ML (ref 26–388)
FLUAV RNA SPEC QL NAA+PROBE: NOT DETECTED
FLUBV RNA SPEC QL NAA+PROBE: NOT DETECTED
GLOBULIN SER CALC-MCNC: 3.6 G/DL (ref 2–4)
GLUCOSE SERPL-MCNC: 114 MG/DL (ref 65–100)
GLUCOSE UR STRIP.AUTO-MCNC: NEGATIVE MG/DL
HAPTOGLOB SERPL-MCNC: 122 MG/DL (ref 30–200)
HCT VFR BLD AUTO: 39.8 % (ref 36.6–50.3)
HCT VFR BLD AUTO: 45.5 % (ref 36.6–50.3)
HGB BLD-MCNC: 12.9 G/DL (ref 12.1–17)
HGB BLD-MCNC: 14.7 G/DL (ref 12.1–17)
HGB UR QL STRIP: NEGATIVE
IMM GRANULOCYTES # BLD AUTO: 0 K/UL (ref 0–0.04)
IMM GRANULOCYTES NFR BLD AUTO: 0 % (ref 0–0.5)
INR PPP: 1 (ref 0.9–1.1)
IRON SATN MFR SERPL: 19 % (ref 20–50)
IRON SERPL-MCNC: 50 UG/DL (ref 35–150)
KETONES UR QL STRIP.AUTO: NEGATIVE MG/DL
LDH SERPL L TO P-CCNC: 113 U/L (ref 85–241)
LEUKOCYTE ESTERASE UR QL STRIP.AUTO: NEGATIVE
LIPASE SERPL-CCNC: 60 U/L (ref 13–75)
LYMPHOCYTES # BLD: 1.6 K/UL (ref 0.8–3.5)
LYMPHOCYTES NFR BLD: 22 % (ref 12–49)
Lab: NORMAL
MCH RBC QN AUTO: 29.3 PG (ref 26–34)
MCHC RBC AUTO-ENTMCNC: 32.3 G/DL (ref 30–36.5)
MCV RBC AUTO: 90.6 FL (ref 80–99)
METHADONE UR QL: NEGATIVE
MONOCYTES # BLD: 0.5 K/UL (ref 0–1)
MONOCYTES NFR BLD: 7 % (ref 5–13)
NEUTS SEG # BLD: 4.9 K/UL (ref 1.8–8)
NEUTS SEG NFR BLD: 67 % (ref 32–75)
NITRITE UR QL STRIP.AUTO: NEGATIVE
NRBC # BLD: 0 K/UL (ref 0–0.01)
NRBC BLD-RTO: 0 PER 100 WBC
OPIATES UR QL: NEGATIVE
PCP UR QL: NEGATIVE
PH UR STRIP: 6.5 (ref 5–8)
PLATELET # BLD AUTO: 164 K/UL (ref 150–400)
PMV BLD AUTO: 10.4 FL (ref 8.9–12.9)
POTASSIUM SERPL-SCNC: 4.1 MMOL/L (ref 3.5–5.1)
PROCALCITONIN SERPL-MCNC: <0.05 NG/ML
PROT SERPL-MCNC: 7 G/DL (ref 6.4–8.2)
PROT UR STRIP-MCNC: NEGATIVE MG/DL
PROTHROMBIN TIME: 10.8 SEC (ref 9–11.1)
RBC # BLD AUTO: 5.02 M/UL (ref 4.1–5.7)
SARS-COV-2 RNA RESP QL NAA+PROBE: NOT DETECTED
SODIUM SERPL-SCNC: 141 MMOL/L (ref 136–145)
SP GR UR REFRACTOMETRY: 1.02 (ref 1–1.03)
SPECIMEN EXP DATE BLD: NORMAL
SPECIMEN HOLD: NORMAL
TIBC SERPL-MCNC: 263 UG/DL (ref 250–450)
UROBILINOGEN UR QL STRIP.AUTO: 1 EU/DL (ref 0.2–1)
VIT B12 SERPL-MCNC: 593 PG/ML (ref 193–986)
WBC # BLD AUTO: 7.3 K/UL (ref 4.1–11.1)

## 2024-03-18 PROCEDURE — 86901 BLOOD TYPING SEROLOGIC RH(D): CPT

## 2024-03-18 PROCEDURE — 83540 ASSAY OF IRON: CPT

## 2024-03-18 PROCEDURE — 85014 HEMATOCRIT: CPT

## 2024-03-18 PROCEDURE — G0378 HOSPITAL OBSERVATION PER HR: HCPCS

## 2024-03-18 PROCEDURE — 99285 EMERGENCY DEPT VISIT HI MDM: CPT

## 2024-03-18 PROCEDURE — A4216 STERILE WATER/SALINE, 10 ML: HCPCS | Performed by: EMERGENCY MEDICINE

## 2024-03-18 PROCEDURE — 85025 COMPLETE CBC W/AUTO DIFF WBC: CPT

## 2024-03-18 PROCEDURE — 6360000002 HC RX W HCPCS: Performed by: EMERGENCY MEDICINE

## 2024-03-18 PROCEDURE — 86850 RBC ANTIBODY SCREEN: CPT

## 2024-03-18 PROCEDURE — 2580000003 HC RX 258: Performed by: INTERNAL MEDICINE

## 2024-03-18 PROCEDURE — 85018 HEMOGLOBIN: CPT

## 2024-03-18 PROCEDURE — 80074 ACUTE HEPATITIS PANEL: CPT

## 2024-03-18 PROCEDURE — 6370000000 HC RX 637 (ALT 250 FOR IP): Performed by: INTERNAL MEDICINE

## 2024-03-18 PROCEDURE — 83010 ASSAY OF HAPTOGLOBIN QUANT: CPT

## 2024-03-18 PROCEDURE — 36415 COLL VENOUS BLD VENIPUNCTURE: CPT

## 2024-03-18 PROCEDURE — 94761 N-INVAS EAR/PLS OXIMETRY MLT: CPT

## 2024-03-18 PROCEDURE — 82607 VITAMIN B-12: CPT

## 2024-03-18 PROCEDURE — 82746 ASSAY OF FOLIC ACID SERUM: CPT

## 2024-03-18 PROCEDURE — 83615 LACTATE (LD) (LDH) ENZYME: CPT

## 2024-03-18 PROCEDURE — 87636 SARSCOV2 & INF A&B AMP PRB: CPT

## 2024-03-18 PROCEDURE — 83550 IRON BINDING TEST: CPT

## 2024-03-18 PROCEDURE — 80307 DRUG TEST PRSMV CHEM ANLYZR: CPT

## 2024-03-18 PROCEDURE — 96376 TX/PRO/DX INJ SAME DRUG ADON: CPT

## 2024-03-18 PROCEDURE — 85610 PROTHROMBIN TIME: CPT

## 2024-03-18 PROCEDURE — 82728 ASSAY OF FERRITIN: CPT

## 2024-03-18 PROCEDURE — 80053 COMPREHEN METABOLIC PANEL: CPT

## 2024-03-18 PROCEDURE — C9113 INJ PANTOPRAZOLE SODIUM, VIA: HCPCS | Performed by: EMERGENCY MEDICINE

## 2024-03-18 PROCEDURE — 81002 URINALYSIS NONAUTO W/O SCOPE: CPT

## 2024-03-18 PROCEDURE — 82077 ASSAY SPEC XCP UR&BREATH IA: CPT

## 2024-03-18 PROCEDURE — 84145 PROCALCITONIN (PCT): CPT

## 2024-03-18 PROCEDURE — 86900 BLOOD TYPING SEROLOGIC ABO: CPT

## 2024-03-18 PROCEDURE — 96374 THER/PROPH/DIAG INJ IV PUSH: CPT

## 2024-03-18 PROCEDURE — 87086 URINE CULTURE/COLONY COUNT: CPT

## 2024-03-18 PROCEDURE — 2580000003 HC RX 258: Performed by: EMERGENCY MEDICINE

## 2024-03-18 PROCEDURE — 83690 ASSAY OF LIPASE: CPT

## 2024-03-18 RX ORDER — POLYETHYLENE GLYCOL 3350 17 G/17G
17 POWDER, FOR SOLUTION ORAL
Status: COMPLETED | OUTPATIENT
Start: 2024-03-19 | End: 2024-03-19

## 2024-03-18 RX ORDER — POLYETHYLENE GLYCOL 3350 17 G/17G
17 POWDER, FOR SOLUTION ORAL
Status: DISPENSED | OUTPATIENT
Start: 2024-03-18 | End: 2024-03-18

## 2024-03-18 RX ORDER — SODIUM CHLORIDE 9 MG/ML
INJECTION, SOLUTION INTRAVENOUS PRN
Status: DISCONTINUED | OUTPATIENT
Start: 2024-03-18 | End: 2024-03-21 | Stop reason: HOSPADM

## 2024-03-18 RX ORDER — ONDANSETRON 2 MG/ML
4 INJECTION INTRAMUSCULAR; INTRAVENOUS EVERY 6 HOURS PRN
Status: DISCONTINUED | OUTPATIENT
Start: 2024-03-18 | End: 2024-03-21 | Stop reason: HOSPADM

## 2024-03-18 RX ORDER — POLYETHYLENE GLYCOL 3350 17 G/17G
17 POWDER, FOR SOLUTION ORAL DAILY PRN
Status: DISCONTINUED | OUTPATIENT
Start: 2024-03-18 | End: 2024-03-21 | Stop reason: HOSPADM

## 2024-03-18 RX ORDER — LISINOPRIL 5 MG/1
5 TABLET ORAL DAILY
Status: DISCONTINUED | OUTPATIENT
Start: 2024-03-18 | End: 2024-03-18

## 2024-03-18 RX ORDER — POLYETHYLENE GLYCOL 3350 17 G/17G
17 POWDER, FOR SOLUTION ORAL
Status: COMPLETED | OUTPATIENT
Start: 2024-03-18 | End: 2024-03-18

## 2024-03-18 RX ORDER — SODIUM CHLORIDE, SODIUM LACTATE, POTASSIUM CHLORIDE, CALCIUM CHLORIDE 600; 310; 30; 20 MG/100ML; MG/100ML; MG/100ML; MG/100ML
INJECTION, SOLUTION INTRAVENOUS CONTINUOUS
Status: DISPENSED | OUTPATIENT
Start: 2024-03-18 | End: 2024-03-20

## 2024-03-18 RX ORDER — CARVEDILOL 3.12 MG/1
3.12 TABLET ORAL 2 TIMES DAILY
Status: DISCONTINUED | OUTPATIENT
Start: 2024-03-18 | End: 2024-03-21 | Stop reason: HOSPADM

## 2024-03-18 RX ORDER — INSULIN LISPRO 100 [IU]/ML
0-4 INJECTION, SOLUTION INTRAVENOUS; SUBCUTANEOUS NIGHTLY
Status: DISCONTINUED | OUTPATIENT
Start: 2024-03-18 | End: 2024-03-18

## 2024-03-18 RX ORDER — AMLODIPINE BESYLATE 5 MG/1
5 TABLET ORAL DAILY
Status: DISCONTINUED | OUTPATIENT
Start: 2024-03-18 | End: 2024-03-18

## 2024-03-18 RX ORDER — ROSUVASTATIN CALCIUM 10 MG/1
10 TABLET, COATED ORAL NIGHTLY
Status: DISCONTINUED | OUTPATIENT
Start: 2024-03-18 | End: 2024-03-21 | Stop reason: HOSPADM

## 2024-03-18 RX ORDER — SODIUM CHLORIDE 0.9 % (FLUSH) 0.9 %
5-40 SYRINGE (ML) INJECTION PRN
Status: DISCONTINUED | OUTPATIENT
Start: 2024-03-18 | End: 2024-03-21 | Stop reason: HOSPADM

## 2024-03-18 RX ORDER — SODIUM CHLORIDE 0.9 % (FLUSH) 0.9 %
5-40 SYRINGE (ML) INJECTION EVERY 12 HOURS SCHEDULED
Status: DISCONTINUED | OUTPATIENT
Start: 2024-03-18 | End: 2024-03-21 | Stop reason: HOSPADM

## 2024-03-18 RX ORDER — INSULIN LISPRO 100 [IU]/ML
0-8 INJECTION, SOLUTION INTRAVENOUS; SUBCUTANEOUS
Status: DISCONTINUED | OUTPATIENT
Start: 2024-03-18 | End: 2024-03-18

## 2024-03-18 RX ORDER — TAMSULOSIN HYDROCHLORIDE 0.4 MG/1
0.4 CAPSULE ORAL DAILY
Status: DISCONTINUED | OUTPATIENT
Start: 2024-03-18 | End: 2024-03-21 | Stop reason: HOSPADM

## 2024-03-18 RX ORDER — ONDANSETRON 4 MG/1
4 TABLET, ORALLY DISINTEGRATING ORAL EVERY 8 HOURS PRN
Status: DISCONTINUED | OUTPATIENT
Start: 2024-03-18 | End: 2024-03-21 | Stop reason: HOSPADM

## 2024-03-18 RX ORDER — ACETAMINOPHEN 650 MG/1
650 SUPPOSITORY RECTAL EVERY 6 HOURS PRN
Status: DISCONTINUED | OUTPATIENT
Start: 2024-03-18 | End: 2024-03-21 | Stop reason: HOSPADM

## 2024-03-18 RX ORDER — DEXTROSE MONOHYDRATE 100 MG/ML
INJECTION, SOLUTION INTRAVENOUS CONTINUOUS PRN
Status: DISCONTINUED | OUTPATIENT
Start: 2024-03-18 | End: 2024-03-18

## 2024-03-18 RX ORDER — ACETAMINOPHEN 325 MG/1
650 TABLET ORAL EVERY 6 HOURS PRN
Status: DISCONTINUED | OUTPATIENT
Start: 2024-03-18 | End: 2024-03-21 | Stop reason: HOSPADM

## 2024-03-18 RX ORDER — LAMOTRIGINE 25 MG/1
25 TABLET ORAL NIGHTLY
Status: DISCONTINUED | OUTPATIENT
Start: 2024-03-18 | End: 2024-03-21 | Stop reason: HOSPADM

## 2024-03-18 RX ORDER — SODIUM CHLORIDE, SODIUM LACTATE, POTASSIUM CHLORIDE, AND CALCIUM CHLORIDE .6; .31; .03; .02 G/100ML; G/100ML; G/100ML; G/100ML
1000 INJECTION, SOLUTION INTRAVENOUS ONCE
Status: COMPLETED | OUTPATIENT
Start: 2024-03-18 | End: 2024-03-18

## 2024-03-18 RX ADMIN — POLYETHYLENE GLYCOL 3350 17 G: 17 POWDER, FOR SOLUTION ORAL at 20:41

## 2024-03-18 RX ADMIN — AMLODIPINE BESYLATE 5 MG: 5 TABLET ORAL at 11:53

## 2024-03-18 RX ADMIN — LAMOTRIGINE 25 MG: 25 TABLET ORAL at 21:53

## 2024-03-18 RX ADMIN — POLYETHYLENE GLYCOL 3350 17 G: 17 POWDER, FOR SOLUTION ORAL at 20:19

## 2024-03-18 RX ADMIN — POLYETHYLENE GLYCOL 3350 17 G: 17 POWDER, FOR SOLUTION ORAL at 21:44

## 2024-03-18 RX ADMIN — SODIUM CHLORIDE, POTASSIUM CHLORIDE, SODIUM LACTATE AND CALCIUM CHLORIDE: 600; 310; 30; 20 INJECTION, SOLUTION INTRAVENOUS at 11:48

## 2024-03-18 RX ADMIN — CARVEDILOL 3.12 MG: 3.12 TABLET, FILM COATED ORAL at 11:53

## 2024-03-18 RX ADMIN — PANTOPRAZOLE SODIUM 40 MG: 40 INJECTION, POWDER, FOR SOLUTION INTRAVENOUS at 08:23

## 2024-03-18 RX ADMIN — POLYETHYLENE GLYCOL 3350 17 G: 17 POWDER, FOR SOLUTION ORAL at 20:20

## 2024-03-18 RX ADMIN — PANTOPRAZOLE SODIUM 40 MG: 40 INJECTION, POWDER, FOR SOLUTION INTRAVENOUS at 20:27

## 2024-03-18 RX ADMIN — SODIUM CHLORIDE, POTASSIUM CHLORIDE, SODIUM LACTATE AND CALCIUM CHLORIDE 1000 ML: 600; 310; 30; 20 INJECTION, SOLUTION INTRAVENOUS at 08:23

## 2024-03-18 RX ADMIN — ROSUVASTATIN 10 MG: 10 TABLET, FILM COATED ORAL at 21:52

## 2024-03-18 RX ADMIN — POLYETHYLENE GLYCOL 3350 17 G: 17 POWDER, FOR SOLUTION ORAL at 21:40

## 2024-03-18 RX ADMIN — TAMSULOSIN HYDROCHLORIDE 0.4 MG: 0.4 CAPSULE ORAL at 11:53

## 2024-03-18 RX ADMIN — POLYETHYLENE GLYCOL 3350 17 G: 17 POWDER, FOR SOLUTION ORAL at 21:55

## 2024-03-18 RX ADMIN — POLYETHYLENE GLYCOL 3350 17 G: 17 POWDER, FOR SOLUTION ORAL at 21:42

## 2024-03-18 RX ADMIN — LISINOPRIL 5 MG: 5 TABLET ORAL at 11:53

## 2024-03-18 RX ADMIN — POLYETHYLENE GLYCOL 3350 17 G: 17 POWDER, FOR SOLUTION ORAL at 20:17

## 2024-03-18 RX ADMIN — SODIUM CHLORIDE, PRESERVATIVE FREE 10 ML: 5 INJECTION INTRAVENOUS at 11:54

## 2024-03-18 ASSESSMENT — PAIN DESCRIPTION - LOCATION: LOCATION: ABDOMEN

## 2024-03-18 ASSESSMENT — PAIN - FUNCTIONAL ASSESSMENT: PAIN_FUNCTIONAL_ASSESSMENT: 0-10

## 2024-03-18 ASSESSMENT — PAIN DESCRIPTION - ORIENTATION: ORIENTATION: MID

## 2024-03-18 ASSESSMENT — PAIN DESCRIPTION - DESCRIPTORS: DESCRIPTORS: DISCOMFORT

## 2024-03-18 ASSESSMENT — PAIN SCALES - GENERAL: PAINLEVEL_OUTOF10: 8

## 2024-03-18 ASSESSMENT — LIFESTYLE VARIABLES
HOW MANY STANDARD DRINKS CONTAINING ALCOHOL DO YOU HAVE ON A TYPICAL DAY: PATIENT DOES NOT DRINK
HOW OFTEN DO YOU HAVE A DRINK CONTAINING ALCOHOL: NEVER

## 2024-03-18 NOTE — CONSULTS
[facility]  Naeem Kent M.D.  [phone]                    GASTROENTEROLOGY CONSULTATION NOTE              NAME:  Campbell Ingram   :   1955   MRN:   [id number]       Referring Physician:    Dr. Ayoub and Dr. Dent      Consult Date:   3/18/2024 4:44 PM    Chief Complaint:    Hematochezia     History of Present Illness:    Patient is a 68 y.o. who presented to the ER this morning with reports of dark blood per rectum. He reports he started yesterday evening with red blood and dark blood, had few episodes overnight and came to the ER this morning. His hemoglobin was found at 14.2. He reports lower abdominal discomfort and sometimes cramps, but he says he has had this for sometime now. His last colonoscopy was with Dr. Sg Trimble at [facility] showing sigmoid diverticulosis and internal hemorrhoids. He denies any NSAIDs, he takes Plavix and took last dose yesterday. He denies constipation, although a couple of times he reports he had hard stools. He reports blood is burgundy in color, no black tarry stools seen. Denies any previous similar episode.     PMH:  Past Medical History:   Diagnosis Date    Arthritis     Bleeding hemorrhoids     BPH (benign prostatic hyperplasia)     CAD (coronary artery disease)     CKD (chronic kidney disease), stage III (HCC)     DM type 2 causing renal disease (HCC)     DM type 2 causing vascular disease (HCC)     GERD (gastroesophageal reflux disease)     Hx of completed stroke     Hypertension     Unspecified sleep apnea     not on CPAP    Vertigo        PSH:  Past Surgical History:   Procedure Laterality Date    CARDIAC CATHETERIZATION      x2    COLONOSCOPY N/A 2021    COLONOSCOPY AND EGD performed by Ron Velazquez MD at [facility] ENDOSCOPY    ORTHOPEDIC SURGERY      YOHAN KNEES x 3 (2 on one knee and once on the other knee) times d/t football injury    ORTHOPEDIC SURGERY       rt thumb nerve release    OTHER SURGICAL HISTORY      left eye  History:  Social History     Tobacco Use    Smoking status: Never    Smokeless tobacco: Never   Substance Use Topics    Alcohol use: No       Family History:  Family History   Problem Relation Age of Onset    Diabetes Mother     Cancer Father         THROAT    Diabetes Sister     Diabetes Maternal Aunt     Diabetes Maternal Aunt     Alzheimer's Disease Maternal Uncle        Review of Systems:  Constitutional: negative fever, negative chills, negative weight loss  Eyes:   negative visual changes  ENT:   negative sore throat, tongue or lip swelling  Respiratory:  negative cough, negative dyspnea  Cards:  negative for chest pain, palpitations, lower extremity edema  GI:   See HPI  :  negative for frequency, dysuria  Integument:  negative for rash and pruritus  Heme:  negative for easy bruising and gum/nose bleeding  Musculoskel: negative for myalgias,  back pain and muscle weakness  Neuro: negative for headaches, dizziness, vertigo  Psych:  negative for feelings of anxiety, depression     Objective:   Patient Vitals for the past 8 hrs:   BP Pulse Resp SpO2   03/18/24 1500 -- 67 -- --   03/18/24 1330 116/63 74 19 95 %   03/18/24 1230 110/76 68 22 94 %   03/18/24 1200 121/75 70 23 94 %   03/18/24 1140 111/86 71 23 96 %   03/18/24 1130 106/82 71 16 97 %   03/18/24 1110 109/68 65 21 93 %   03/18/24 1100 112/68 67 21 94 %   03/18/24 1032 130/73 72 16 97 %   03/18/24 1000 120/71 64 19 94 %   03/18/24 0900 111/76 60 20 92 %     No intake/output data recorded.  No intake/output data recorded.    EXAM:     NEURO-alert, oriented x3, affect appropriate, no focal neurological deficits, moves all extremities well, and no involuntary movements   HEENT-Head: Normocephalic, no lesions, without obvious abnormality.   LUNGS-clear to auscultation bilaterally    COR-regular rate and rhythym     ABD- soft, non-tender. Bowel sounds normal. No masses,  no organomegaly     EXT-no edema    Skin - No rash     Data Review     Recent Labs

## 2024-03-18 NOTE — ED NOTES
This RN contacted hospitalist due to another bloody bowel movement. This is the patients third blood BM since being here. Pt's brief has been changed. BM is just bright red blood with clots.

## 2024-03-18 NOTE — ED TRIAGE NOTES
Patient ambulatory to Triage with c/o rectal bleeding for the past few hours    Patient reports that he has had this happen to him in the past, patient states that he does have hemorrhoids.

## 2024-03-18 NOTE — H&P
[facility]    Hospitalist Admission Note                                                                                                                                  NAME:  Campbell Ingram   :   1955   MRN:  [id number]     PCP:  Jamel Davila MD     Date/Time of service:  3/18/2024 10:17 AM  To assist coordination of care and communication with nursing and staff, this note may be preliminary early in the day, but finalized by end of the day.        Subjective:     CHIEF COMPLAINT: BRBPR     HISTORY OF PRESENT ILLNESS:     Mr. Ingram is a 68 y.o. male who presented to the Emergency Department complaining of BRBPR and some dark blood.  Noted last night and persistent. Painless.  Hx of hemorrhoids and gastritis.  ER finds no anemia, normal vitals.  We will admit him for observation    Past Medical History:   Diagnosis Date    Arthritis     Bleeding hemorrhoids     BPH (benign prostatic hyperplasia)     CAD (coronary artery disease)     CKD (chronic kidney disease), stage III (HCC)     DM type 2 causing renal disease (HCC)     DM type 2 causing vascular disease (HCC)     GERD (gastroesophageal reflux disease)     Hx of completed stroke     Hypertension     Unspecified sleep apnea     not on CPAP    Vertigo         Past Surgical History:   Procedure Laterality Date    CARDIAC CATHETERIZATION      x2    COLONOSCOPY N/A 2021    COLONOSCOPY AND EGD performed by Ron Velazquez MD at [facility] ENDOSCOPY    ORTHOPEDIC SURGERY      YOHAN KNEES x 3 (2 on one knee and once on the other knee) times d/t football injury    ORTHOPEDIC SURGERY       rt thumb nerve release    OTHER SURGICAL HISTORY      left eye implant     OTHER SURGICAL HISTORY      KIDNEY STONES REMOVED    TN UNLISTED PROCEDURE ABDOMEN PERITONEUM & OMENTUM      Gall bladder removed     TONSILLECTOMY AND ADENOIDECTOMY         Social History     Tobacco Use    Smoking status: Never    Smokeless tobacco: Never    Substance Use Topics    Alcohol use: No        Family History   Problem Relation Age of Onset    Diabetes Mother     Cancer Father         THROAT    Diabetes Sister     Diabetes Maternal Aunt     Diabetes Maternal Aunt     Alzheimer's Disease Maternal Uncle         Allergies   Allergen Reactions    Iodine Hives        Prior to Admission medications    Medication Sig Start Date End Date Taking? Authorizing Provider   amLODIPine (NORVASC) 5 MG tablet Take 5 mg by mouth daily    Automatic Reconciliation, Ar   aspirin 81 MG EC tablet Take 1 tablet by mouth daily 8/2/19   Automatic Reconciliation, Ar   carvedilol (COREG) 3.125 MG tablet Take 3.125 mg by mouth 2 times daily    Automatic Reconciliation, Ar   cetirizine (ZYRTEC) 10 MG tablet Take 10 mg by mouth daily    Automatic Reconciliation, Ar   clopidogrel (PLAVIX) 75 MG tablet Take 75 mg by mouth daily    Automatic Reconciliation, Ar   guaiFENesin (MUCINEX) 600 MG extended release tablet Take 600 mg by mouth 2 times daily    Automatic Reconciliation, Ar   lamoTRIgine (LAMICTAL) 25 MG tablet Take 1 tablet by mouth nightly 11/25/22   Automatic Reconciliation, Ar   lisinopril (PRINIVIL;ZESTRIL) 5 MG tablet Take 5 mg by mouth daily    Automatic Reconciliation, Ar   metFORMIN (GLUCOPHAGE) 500 MG tablet Take 500 mg by mouth 3 times daily (with meals)    Automatic Reconciliation, Ar   nitroGLYCERIN (NITROSTAT) 0.4 MG SL tablet Place 0.4 mg under the tongue    Automatic Reconciliation, Ar   pantoprazole (PROTONIX) 40 MG tablet Take 40 mg by mouth every morning (before breakfast) 2/6/17   Automatic Reconciliation, Ar   rosuvastatin (CRESTOR) 10 MG tablet Take 10 mg by mouth nightly    Automatic Reconciliation, Ar   tamsulosin (FLOMAX) 0.4 MG capsule Take 0.4 mg by mouth daily 3/22/22   Automatic Reconciliation, Ar       Review of Systems:  (bold if positive, if negative)    Gen:  Eyes:  ENT:  CVS:  Pulm:  GI:  BRBPR and melena  :  MS:  Skin:  Psych:  Endo:  Hem:  Renal:

## 2024-03-18 NOTE — ED PROVIDER NOTES
[facility] EMERGENCY DEPT  EMERGENCY DEPARTMENT ENCOUNTER      Pt Name: Campbell Ingram  MRN: [id number]  Birthdate 1955  Date of evaluation: 3/18/2024  Provider: Paco Ayoub MD    CHIEF COMPLAINT       Chief Complaint   Patient presents with    Rectal Bleeding         HISTORY OF PRESENT ILLNESS   (Location/Symptom, Timing/Onset, Context/Setting, Quality, Duration, Modifying Factors, Severity)  Note limiting factors.   HPI    68-year-old male not anticoagulated, on Plavix for CAD, with PMHx significant for internal hemorrhoids and diverticulosis presents to ED with 15 hours of painless rectal bleeding which has been noted hourly throughout the evening.  He has noted lower abdominal cramping, gurgling, as well as lightheadedness, and has had carlito blood per rectum.  He has had hemorrhoids in the past, but this has been more severe. He has noted bleeding with valsalva as well, noting rectal bleeding after urination as well as flatus. He has not had loss of consciousness, chest pain, dyspnea, palpitations.  He has undergone colonoscopy in the past with GI.  He reports allergy to contrast, describing urticaria as well as loss of consciousness.    Nursing Notes were reviewed.    REVIEW OF SYSTEMS    (2-9 systems for level 4, 10 or more for level 5)     Review of Systems    Except as noted above the remainder of the review of systems was reviewed and negative.       PAST MEDICAL HISTORY     Past Medical History:   Diagnosis Date    Arthritis     Bleeding hemorrhoids     BPH (benign prostatic hyperplasia)     CAD (coronary artery disease)     CKD (chronic kidney disease), stage III (HCC)     DM type 2 causing renal disease (HCC)     DM type 2 causing vascular disease (HCC)     GERD (gastroesophageal reflux disease)     Hx of completed stroke     Hypertension     Unspecified sleep apnea     not on CPAP    Vertigo          SURGICAL HISTORY       Past Surgical History:   Procedure Laterality Date    CARDIAC  [address] [employer] Examination       Student's Name  Kayleigh Hayward Birth Date Signature                                                                                                                                   Title                           Date     Signature Female School   Grade Level/ID#  8th Grade   HEALTH HISTORY          TO BE COMPLETED AND SIGNED BY PARENT/GUARDIAN AND VERIFIED BY HEALTH CARE PROVIDER    ALLERGIES  (Food, drug, insect, other)  Amoxicillin; Lamotrigine;  Penicillins MEDICATION  (List all p Bone/Joint problem/injury/scoliosis?    Yes   No  Parent/Guardian Signature                                          Date     PHYSICAL EXAMINATION REQUIREMENTS    Entire section below to be completed by MD/DO/APN/PA       PHYSICAL EXAMINATION REQUIREMENTS ( Course as of 03/18/24 1104   Mon Mar 18, 2024   0745 BUN,BUNPL(!): 22 [RS]   0745 Creatinine(!): 1.35 [RS]   0745 Hemoglobin Quant: 14.7 [RS]   0900 Resting comfortably on reeval, VS stable. No further BM in ED. D/w pt plan for GI consult and likely admission and he is agreeable [RS]   0917 D/w Dr. Kent, bleeding could be diverticular. If additional hematochezia noted, would benefit from CTA with pre-treatment vs NM bleeding scan. Recommends admission with hospitalist. Suspect colonoscopy tomorrow.  [RS]      ED Course User Index  [RS] Paco Ayoub MD           CONSULTS:  IP CONSULT TO GI    PROCEDURES:  Unless otherwise noted below, none     Procedures  Perfect Serve Consult for Admission  11:04 AM    ED Room Number: ER08/08  Patient Name and age:  Campbell Ingram 68 y.o.  male  Working Diagnosis:   1. Lower GI bleed        COVID-19 Suspicion: No  Sepsis present:  No  Reassessment needed: No  Code Status:  Full Code  Readmission: No  Isolation Requirements: no  Recommended Level of Care: telemetry  Department: [facility] - [phone]  Consulting Provider: Dr. Kent (GI)    Other:  69yo M with lower GI bleeding overnight into this AM, lightheaded but no syncope, reassuring vitals. Does note low abd cramping and hourly episodes of BRBPR overnight, suspect diverticular bleed. Planned for CTA but pt with prior contrast reaction. No BM here in ER. D/w GI, recommend CTA vs tagged RBC scan if episode recurs, plan for prep today and colonscopy tomorrow.       FINAL IMPRESSION      1. Lower GI bleed          DISPOSITION/PLAN   DISPOSITION   Admitted  03/18/2024 10:17:05 AM      PATIENT REFERRED TO:  No follow-up provider specified.    DISCHARGE MEDICATIONS:  New Prescriptions    No medications on file         (Please note that portions of this note were completed with a voice recognition program.  Efforts were made to edit the dictations but occasionally words are mis-transcribed.)    Paco Ayoub MD  Ears Yes                      Screen result: Gastrointestinal Yes    Eyes Yes     Screen result:   Genito-Urinary Yes  LMP   Nose Yes  Neurological Yes    Throat Yes  Musculoskeletal Yes    Mouth/Dental Yes  Spinal examination Yes    Cardiovascular/HTN Yes Rev 11/15                                                                    Printed by the [employer]

## 2024-03-18 NOTE — CARE COORDINATION
3/18/2024  11:45 AM     03/18/24 1141   Service Assessment   Patient Orientation Alert and Oriented   Cognition Alert   History Provided By Patient   Primary Caregiver Self   Support Systems Spouse/Significant Other;Children   Patient's Healthcare Decision Maker is: Legal Next of Kin  (spouse Lia Rudolph   (YOHANA) [phone])   PCP Verified by CM Yes  (Jamel Davila MD)   Last Visit to PCP Within last 3 months   Prior Functional Level Independent in ADLs/IADLs   Current Functional Level Independent in ADLs/IADLs   Can patient return to prior living arrangement Yes   Ability to make needs known: Good   Family able to assist with home care needs: Yes   Financial Resources Medicare  ([facility] Medicare)   Community Resources None   Social/Functional History   Lives With Spouse   Type of Home House   Home Equipment None   ADL Assistance Independent   Ambulation Assistance Independent   Transfer Assistance Independent   Active  No   Patient's  Info Family   Mode of Transportation Car   Discharge Planning   Current Services Prior To Admission None   Potential Assistance Purchasing Medications No  ([facility], pt uses [employer])   Patient expects to be discharged to: House   History of falls? 0  (pt relates dizziness d/t vertigo, has not fallen)   Services At/After Discharge   Mode of Transport at Discharge Self  (Family)   Confirm Follow Up Transport Family     Pt is a 69 yo male admitted OBS for BRBPR, is continuing to require medical management  CM met w/ pt introduced self explained role of CM.  Charted demographics verified, pt lives w/ spouse Lia Rudolph in private residence, at baseline pt reports to be ambulatory, independent w/ ADLs/iADLS, pt has Hx of vertigo feels dizzy frequently however denies falls.  Pt uses no DME, No Home O2 or CPaP,  Pt has no Hx of HH, SNF or IPR , has had outpatient PT  Family will transport at DC  There are no anticipated CM DC needs, providers please consult CM for any needs

## 2024-03-18 NOTE — ED NOTES
Pt resting comfortably. Spoke with pt about plan with his colonoscopy prep fluids. Pt has a bedside commode next to bed and will call out when he needs to go. Pt will also be put back in brief after using commode as it is more comfortable for him.

## 2024-03-18 NOTE — ED NOTES
Bedside and Verbal shift change report given to Carol (oncoming nurse) by Shadia (offgoing nurse). Report included the following information Nurse Handoff Report, Index, ED Encounter Summary, ED SBAR, Adult Overview, Intake/Output, MAR, Recent Results, and Neuro Assessment.

## 2024-03-19 ENCOUNTER — ANESTHESIA EVENT (OUTPATIENT)
Facility: HOSPITAL | Age: 69
End: 2024-03-19
Payer: MEDICARE

## 2024-03-19 ENCOUNTER — ANESTHESIA (OUTPATIENT)
Facility: HOSPITAL | Age: 69
End: 2024-03-19
Payer: MEDICARE

## 2024-03-19 LAB
-: NORMAL
ALBUMIN SERPL-MCNC: 2.8 G/DL (ref 3.5–5)
ALBUMIN/GLOB SERPL: 1 (ref 1.1–2.2)
ALP SERPL-CCNC: 50 U/L (ref 45–117)
ALT SERPL-CCNC: 16 U/L (ref 12–78)
ANION GAP SERPL CALC-SCNC: 3 MMOL/L (ref 5–15)
AST SERPL-CCNC: 14 U/L (ref 15–37)
BACTERIA SPEC CULT: NORMAL
BILIRUB SERPL-MCNC: 0.7 MG/DL (ref 0.2–1)
BUN SERPL-MCNC: 18 MG/DL (ref 6–20)
BUN/CREAT SERPL: 14 (ref 12–20)
CALCIUM SERPL-MCNC: 8.1 MG/DL (ref 8.5–10.1)
CHLORIDE SERPL-SCNC: 112 MMOL/L (ref 97–108)
CO2 SERPL-SCNC: 25 MMOL/L (ref 21–32)
CREAT SERPL-MCNC: 1.26 MG/DL (ref 0.7–1.3)
ERYTHROCYTE [DISTWIDTH] IN BLOOD BY AUTOMATED COUNT: 13.6 % (ref 11.5–14.5)
FOLATE SERPL-MCNC: >20 NG/ML
GLOBULIN SER CALC-MCNC: 2.7 G/DL (ref 2–4)
GLUCOSE SERPL-MCNC: 107 MG/DL (ref 65–100)
HAV IGM SER QL: NONREACTIVE
HBV CORE IGM SER QL: NONREACTIVE
HBV SURFACE AG SER QL: 0.13 INDEX
HBV SURFACE AG SER QL: NEGATIVE
HCT VFR BLD AUTO: 35.9 % (ref 36.6–50.3)
HCV AB SER IA-ACNC: 0.08 INDEX
HCV AB SERPL QL IA: NONREACTIVE
HGB BLD-MCNC: 11.6 G/DL (ref 12.1–17)
MAGNESIUM SERPL-MCNC: 1.6 MG/DL (ref 1.6–2.4)
MCH RBC QN AUTO: 29.2 PG (ref 26–34)
MCHC RBC AUTO-ENTMCNC: 32.3 G/DL (ref 30–36.5)
MCV RBC AUTO: 90.4 FL (ref 80–99)
NRBC # BLD: 0 K/UL (ref 0–0.01)
NRBC BLD-RTO: 0 PER 100 WBC
PHOSPHATE SERPL-MCNC: 2.8 MG/DL (ref 2.6–4.7)
PLATELET # BLD AUTO: 128 K/UL (ref 150–400)
PMV BLD AUTO: 10.3 FL (ref 8.9–12.9)
POTASSIUM SERPL-SCNC: 3.9 MMOL/L (ref 3.5–5.1)
PROT SERPL-MCNC: 5.5 G/DL (ref 6.4–8.2)
RBC # BLD AUTO: 3.97 M/UL (ref 4.1–5.7)
SERVICE CMNT-IMP: NORMAL
SODIUM SERPL-SCNC: 140 MMOL/L (ref 136–145)
WBC # BLD AUTO: 7.4 K/UL (ref 4.1–11.1)

## 2024-03-19 PROCEDURE — 85027 COMPLETE CBC AUTOMATED: CPT

## 2024-03-19 PROCEDURE — 3600007512: Performed by: INTERNAL MEDICINE

## 2024-03-19 PROCEDURE — 84100 ASSAY OF PHOSPHORUS: CPT

## 2024-03-19 PROCEDURE — A4216 STERILE WATER/SALINE, 10 ML: HCPCS | Performed by: EMERGENCY MEDICINE

## 2024-03-19 PROCEDURE — 2580000003 HC RX 258: Performed by: INTERNAL MEDICINE

## 2024-03-19 PROCEDURE — 36415 COLL VENOUS BLD VENIPUNCTURE: CPT

## 2024-03-19 PROCEDURE — 6360000002 HC RX W HCPCS: Performed by: EMERGENCY MEDICINE

## 2024-03-19 PROCEDURE — 3700000001 HC ADD 15 MINUTES (ANESTHESIA): Performed by: INTERNAL MEDICINE

## 2024-03-19 PROCEDURE — 2580000003 HC RX 258: Performed by: EMERGENCY MEDICINE

## 2024-03-19 PROCEDURE — 7100000010 HC PHASE II RECOVERY - FIRST 15 MIN: Performed by: INTERNAL MEDICINE

## 2024-03-19 PROCEDURE — 80053 COMPREHEN METABOLIC PANEL: CPT

## 2024-03-19 PROCEDURE — 6360000002 HC RX W HCPCS: Performed by: NURSE ANESTHETIST, CERTIFIED REGISTERED

## 2024-03-19 PROCEDURE — 0DJD8ZZ INSPECTION OF LOWER INTESTINAL TRACT, VIA NATURAL OR ARTIFICIAL OPENING ENDOSCOPIC: ICD-10-PCS | Performed by: INTERNAL MEDICINE

## 2024-03-19 PROCEDURE — 96376 TX/PRO/DX INJ SAME DRUG ADON: CPT

## 2024-03-19 PROCEDURE — G0378 HOSPITAL OBSERVATION PER HR: HCPCS

## 2024-03-19 PROCEDURE — C9113 INJ PANTOPRAZOLE SODIUM, VIA: HCPCS | Performed by: EMERGENCY MEDICINE

## 2024-03-19 PROCEDURE — 7100000011 HC PHASE II RECOVERY - ADDTL 15 MIN: Performed by: INTERNAL MEDICINE

## 2024-03-19 PROCEDURE — 94761 N-INVAS EAR/PLS OXIMETRY MLT: CPT

## 2024-03-19 PROCEDURE — 83735 ASSAY OF MAGNESIUM: CPT

## 2024-03-19 PROCEDURE — A4216 STERILE WATER/SALINE, 10 ML: HCPCS | Performed by: INTERNAL MEDICINE

## 2024-03-19 PROCEDURE — 6370000000 HC RX 637 (ALT 250 FOR IP): Performed by: INTERNAL MEDICINE

## 2024-03-19 PROCEDURE — 3700000000 HC ANESTHESIA ATTENDED CARE: Performed by: INTERNAL MEDICINE

## 2024-03-19 PROCEDURE — 3600007502: Performed by: INTERNAL MEDICINE

## 2024-03-19 RX ORDER — FERROUS GLUCONATE 324(38)MG
324 TABLET ORAL
Qty: 30 TABLET | Refills: 3 | Status: SHIPPED | OUTPATIENT
Start: 2024-03-20

## 2024-03-19 RX ORDER — LIDOCAINE HCL/PF 100 MG/5ML
SYRINGE (ML) INJECTION PRN
Status: DISCONTINUED | OUTPATIENT
Start: 2024-03-19 | End: 2024-03-19 | Stop reason: SDUPTHER

## 2024-03-19 RX ORDER — PROPOFOL 10 MG/ML
INJECTION, EMULSION INTRAVENOUS PRN
Status: DISCONTINUED | OUTPATIENT
Start: 2024-03-19 | End: 2024-03-19 | Stop reason: SDUPTHER

## 2024-03-19 RX ORDER — SODIUM CHLORIDE 9 MG/ML
INJECTION, SOLUTION INTRAVENOUS CONTINUOUS
Status: DISCONTINUED | OUTPATIENT
Start: 2024-03-19 | End: 2024-03-21 | Stop reason: HOSPADM

## 2024-03-19 RX ORDER — FERROUS GLUCONATE 324(38)MG
324 TABLET ORAL
Status: DISCONTINUED | OUTPATIENT
Start: 2024-03-20 | End: 2024-03-21 | Stop reason: HOSPADM

## 2024-03-19 RX ADMIN — POLYETHYLENE GLYCOL 3350 17 G: 17 POWDER, FOR SOLUTION ORAL at 06:42

## 2024-03-19 RX ADMIN — SODIUM CHLORIDE, POTASSIUM CHLORIDE, SODIUM LACTATE AND CALCIUM CHLORIDE: 600; 310; 30; 20 INJECTION, SOLUTION INTRAVENOUS at 21:10

## 2024-03-19 RX ADMIN — SODIUM CHLORIDE, PRESERVATIVE FREE 10 ML: 5 INJECTION INTRAVENOUS at 11:01

## 2024-03-19 RX ADMIN — PROPOFOL 120 MCG/KG/MIN: 10 INJECTION, EMULSION INTRAVENOUS at 17:28

## 2024-03-19 RX ADMIN — SODIUM CHLORIDE, POTASSIUM CHLORIDE, SODIUM LACTATE AND CALCIUM CHLORIDE: 600; 310; 30; 20 INJECTION, SOLUTION INTRAVENOUS at 06:52

## 2024-03-19 RX ADMIN — LIDOCAINE HYDROCHLORIDE 100 MG: 20 INJECTION INTRAVENOUS at 17:27

## 2024-03-19 RX ADMIN — TAMSULOSIN HYDROCHLORIDE 0.4 MG: 0.4 CAPSULE ORAL at 10:53

## 2024-03-19 RX ADMIN — PANTOPRAZOLE SODIUM 40 MG: 40 INJECTION, POWDER, FOR SOLUTION INTRAVENOUS at 22:21

## 2024-03-19 RX ADMIN — PROPOFOL 80 MG: 10 INJECTION, EMULSION INTRAVENOUS at 17:27

## 2024-03-19 RX ADMIN — POLYETHYLENE GLYCOL 3350 17 G: 17 POWDER, FOR SOLUTION ORAL at 06:40

## 2024-03-19 RX ADMIN — ROSUVASTATIN 10 MG: 10 TABLET, FILM COATED ORAL at 21:07

## 2024-03-19 RX ADMIN — LAMOTRIGINE 25 MG: 25 TABLET ORAL at 21:07

## 2024-03-19 RX ADMIN — POLYETHYLENE GLYCOL 3350 17 G: 17 POWDER, FOR SOLUTION ORAL at 06:41

## 2024-03-19 RX ADMIN — CARVEDILOL 3.12 MG: 3.12 TABLET, FILM COATED ORAL at 10:53

## 2024-03-19 RX ADMIN — SODIUM CHLORIDE, POTASSIUM CHLORIDE, SODIUM LACTATE AND CALCIUM CHLORIDE: 600; 310; 30; 20 INJECTION, SOLUTION INTRAVENOUS at 14:44

## 2024-03-19 RX ADMIN — PANTOPRAZOLE SODIUM 40 MG: 40 INJECTION, POWDER, FOR SOLUTION INTRAVENOUS at 11:01

## 2024-03-19 RX ADMIN — POLYETHYLENE GLYCOL 3350 17 G: 17 POWDER, FOR SOLUTION ORAL at 06:47

## 2024-03-19 RX ADMIN — SODIUM CHLORIDE: 9 INJECTION, SOLUTION INTRAVENOUS at 17:20

## 2024-03-19 ASSESSMENT — PAIN DESCRIPTION - DESCRIPTORS: DESCRIPTORS: ACHING

## 2024-03-19 ASSESSMENT — PAIN - FUNCTIONAL ASSESSMENT: PAIN_FUNCTIONAL_ASSESSMENT: 0-10

## 2024-03-19 NOTE — ANESTHESIA POSTPROCEDURE EVALUATION
Department of Anesthesiology  Postprocedure Note    Patient: Campbell Ingram  MRN: [id number]  YOB: 1955  Date of evaluation: 3/19/2024    Procedure Summary       Date: 03/19/24 Room / Location: [facility] ENDO 03 / [facility] ENDOSCOPY    Anesthesia Start: 1724 Anesthesia Stop: 1747    Procedure: COLONOSCOPY DIAGNOSTIC (Lower GI Region) Diagnosis:       Diverticular hemorrhage      (Diverticular hemorrhage [K57.31])    Surgeons: Naeem Kent MD Responsible Provider: Eitan Mccartney MD    Anesthesia Type: MAC ASA Status: 3            Anesthesia Type: No value filed.    Ulysses Phase I: Ulysses Score: 10    Ulysses Phase II: Ulysses Score: 8    Anesthesia Post Evaluation    Patient location during evaluation: PACU  Patient participation: complete - patient participated  Level of consciousness: awake  Pain score: 0  Airway patency: patent  Nausea & Vomiting: no nausea and no vomiting  Cardiovascular status: blood pressure returned to baseline  Respiratory status: acceptable  Hydration status: euvolemic  Pain management: adequate    No notable events documented.

## 2024-03-19 NOTE — PERIOP NOTE
TRANSFER - OUT REPORT:    Verbal report given to Rocio URRUTIA on Campbell Ingram  being transferred to [facility] for routine progression of patient care       Report consisted of patient's Situation, Background, Assessment and   Recommendations(SBAR).     Information from the following report(s) Alarm Parameters, Pre Procedure Checklist, and Procedure Verification was reviewed with the receiving nurse.           Lines:   Peripheral IV 03/18/24 Distal;Left;Anterior Antecubital (Active)   Site Assessment Clean, dry & intact 03/19/24 1757   Line Status Capped;Flushed;Normal saline locked 03/19/24 1757   Line Care Connections checked and tightened;Cap changed 03/19/24 1757   Phlebitis Assessment No symptoms 03/19/24 1757   Infiltration Assessment 0 03/19/24 1757   Alcohol Cap Used Yes 03/19/24 1757   Dressing Status Clean, dry & intact 03/19/24 1757   Dressing Type Transparent 03/19/24 1757        Opportunity for questions and clarification was provided.      Patient transported with:  Tech

## 2024-03-19 NOTE — DISCHARGE INSTRUCTIONS
HOSPITALIST DISCHARGE INSTRUCTIONS  NAME:  Campbell Ingram   :  1955   MRN:  [id number]     Date/Time:  3/19/2024 11:39 AM    ADMIT DATE: 3/18/2024     DISCHARGE DATE: 3/19/2024     DISCHARGE DIAGNOSIS:  Bright red blood per rectum    DISCHARGE INSTRUCTIONS:  Thank you for allowing us to participate in your care. Your discharging Hospitalist is Ephraim Dent MD. You were admitted for evaluation and treatment of the following:    Bright red blood per rectum - This was likely due to ordinary hemorrhoids or diverticula.  GI was consulted. Endoscopy today found no active bleeding.     Anemia - This was mild.  You had low levels of iron in your body. At home take an iron supplement     Diabetes type 2 causing vascular and renal disease - Eat a diabetic diet. Continue home metformin.     Coronary artery disease / Hypertension - While here your blood pressure has been low. Continue coreg, lisinopril and rosuvastatin.  In 2 days resume aspirin and plavix.  Stop amlodipine.      Arthritis - Take Tylenol as needed.  Avoid Advil, motrin and other similar medicines      Gastroesophageal reflux disease - Continue sdaily antacid.       Hx of completed stroke - In two days resume aspirin and plavix. Continue lamical      Obese / Obstructive sleep apnea syndrome - We advise weight loss and compliance with CPAP      Benign prostatic hyperplasia - Resume flomax.      Chronic kidney disease, stage III - This is stable    MEDICATIONS:    It is important that you take the medication exactly as they are prescribed.   Keep your medication in the bottles provided by the pharmacist and keep a list of the medication names, dosages, and times to be taken in your wallet.   Do not take other medications without consulting your doctor.       If you experience any of the following symptoms then please call your primary care physician or return to the emergency room if you cannot get hold of your doctor:  Fever, chills, nausea,  hours.   Chest pain, shortness of breath, fever, chills, nausea, vomiting, diarrhea, change in mentation, falling, weakness, bleeding. Severe pain or pain not relieved by medications.  Or, any other signs or symptoms that you may have questions about.     DISPOSITION:        x Home With:    OT   PT   HH   RN        Long term SNF/Inpatient Rehab     Independent/assisted living     Hospice     Other:         PATIENT CONDITION AT DISCHARGE:      Functional status     Poor      Deconditioned    x Independent       Cognition    x Lucid      Forgetful      Dementia       Catheters/lines (plus indication)     Mena      PICC      PEG    x None       Full Code          PHYSICAL EXAMINATION AT DISCHARGE:     General : alert x 3, awake, no acute distress,   HEENT: PEERL, EOMI, moist mucus membrane, TM clear  Neck: supple, no JVD, no meningeal signs  Chest: Clear to auscultation bilaterally   CVS: S1 S2 heard, Capillary refill less than 2 seconds  Abd: soft/ Non tender, non distended, BS physiological,   Ext: no clubbing, no cyanosis, no edema, brisk 2+ DP pulses  Neuro/Psych: pleasant mood and affect, CN 2-12 grossly intact, sensory grossly within normal limit  Skin: warm      CHRONIC MEDICAL DIAGNOSES:        Greater than 31 minutes were spent with the patient on counseling and coordination of care     Signed:   Yesenia Banegas MD  3/21/2024  8:53 AM

## 2024-03-19 NOTE — PERIOP NOTE
TRANSFER - IN REPORT:    Verbal report received from Kanika URRUTIA on Campbell Ingram  being received from 505 for ordered procedure      Report consisted of patient's Situation, Background, Assessment and   Recommendations(SBAR).     Information from the following report(s) Alarm Parameters, Pre Procedure Checklist, and Procedure Verification was reviewed with the receiving nurse.    Opportunity for questions and clarification was provided.      Assessment completed upon patient's arrival to unit and care assumed.

## 2024-03-19 NOTE — DISCHARGE SUMMARY
Physician Discharge Summary     Patient ID:  Campbell Ingram  [id number]  68 y.o.  1955    Admit date: 3/18/2024    Discharge date of service and time: 3/19/2024  Greater than 30 minutes were spent providing discharge related services for this patient    Admission Diagnoses: Lower GI bleed [K92.2]  BRBPR (bright red blood per rectum) [K62.5]    Discharge Diagnoses:    Principal Diagnosis   BRBPR (bright red blood per rectum)                                             Hospital Course and other diagnoses  Bright red blood per rectum / Melena / Hx hemorrhoids - POA, painless, without SIRS criteria, hypotension, nor anemia.  Likely hemorrhoids vs diverticula.  GI consulted. Endoscopy today found \"\".     Anemia - Noted after hydration, due to dilution and bleeding. Monitor.  Iron low on serologies, suggesting some chronic bleeding.  DC on PO iron     DM type 2 causing vascular and renal disease - Diabetic diet and counseling.  SSI per protocol.  Hold home metformin until after endoscopy. Check A1c.     Coronary artery disease / Hypertension - POA and BP has been low. Continue coreg and rosuvastatin.  Hold amlodipine, lisinopril, ASA and plavix.  PCP to consider consolidating the 3 low dose meds.      Arthritis - Tylenol prn.  No NSAIDS.      Gastroesophageal reflux disease - Continue PPI by IV.       Hx of completed stroke - POA, stable.  For today hold ASA, plavix, but continue statin and lamical      Obese / Obstructive sleep apnea syndrome - Advise weight loss and compliance with CPAP      Benign prostatic hyperplasia - Resume flomax.  Check UA     Chronic kidney disease), stage III - Monitor.  Resume ACE     PCP: Jamel Davila MD    Consults: GI    Significant Diagnostic Studies: See Hospital Course    Discharged home in improved condition.    Discharge Exam:  BP: (!) 107/59   Pulse: 76   Resp: 17   Temp: 98.2 °F (36.8 °C)   TempSrc: Oral   SpO2: 96%   Weight:  90 kg   Height:  167 cm         Gen:  Obese,

## 2024-03-19 NOTE — PERIOP NOTE
Received recovery report from anesthesia team, see anesthesia note. Abdomen remains soft and non-tender post-procedure. Pt has no complaints at this time and tolerated procedure well. Endoscope was pre-cleaned at the bedside by Gunnar Pena immediately following procedure. Post recovery report given to Hetal Peralta RN.

## 2024-03-19 NOTE — OP NOTE
[facility]  Naeem Kent M.D.  [phone]            3/19/2024          Colonoscopy Operative Report  Campbell Ingram  :  1955  [facility] Medical Record Number:  [id number]      Indications:    Hematochezia/melena     :  Naeem Kent MD    Referring Provider: Jamel Davila MD    Sedation:  MAC anesthesia    Pre-Procedural Exam:      Airway: clear,  No airway problems anticipated  Heart: RRR, without gallops or rubs  Lungs: clear bilaterally without wheezes, crackles, or rhonchi  Abdomen: soft, nontender, nondistended, bowel sounds present  Mental Status: awake, alert and oriented to person, place and time     Procedure Details:  After informed consent was obtained with all risks and benefits of procedure explained and preoperative exam completed, the patient was taken to the endoscopy suite and placed in the left lateral decubitus position.  Upon sequential sedation as per above, a digital rectal exam was performed. The Olympus videocolonoscope  was inserted in the rectum and carefully advanced to the cecum, which was identified by the ileocecal valve and appendiceal orifice.  The quality of preparation was good.  The colonoscope was slowly withdrawn with careful inspection and evaluation between folds. Retroflexion in the rectum was performed.    Findings:   Terminal Ileum: not intubated  Cecum: normal mucosa, no blood seen  Ascending Colon: normal mucosa, no blood seen  Transverse Colon: normal  Descending Colon: no mucosal lesion appreciated  mild diverticulosis in the distal descending colon, no active bleeding seen.  Sigmoid: no mucosal lesion appreciated  moderate diverticulosis; residual old blood seen, washed away, no active bleeding seen.  Rectum: no mucosal lesion appreciated  Grade 1 internal hemorrhoid(s);    Interventions:  none    Specimen Removed:  none    Complications: None.     EBL:  None.    Impression:  Left colon diverticulosis, most likely source of  bleeding that has ceased at this point                        Normal mucosa throughout the colon    Recommendations:  -Repeat colonoscopy in 10 years   -Resume diet today and discharge in am if no further bleeding.  -Can resume Plavix tomorrow.  -Will need to maintain daily intake of fiber supplement.      Naeem Kent MD  3/19/2024  5:48 PM

## 2024-03-19 NOTE — ED NOTES
TRANSFER - OUT REPORT:    Verbal report given to RENAN Almonte on Campbell Ingram  being transferred to [facility]/Med-Surg for routine progression of patient care       Report consisted of patient's Situation, Background, Assessment and   Recommendations(SBAR).     Information from the following report(s) Nurse Handoff Report, Intake/Output, MAR, Recent Results, Quality Measures, and Neuro Assessment was reviewed with the receiving nurse.    [place]ley Fall Assessment:    Presents to emergency department  because of falls (Syncope, seizure, or loss of consciousness): No  Age > 70: No  Altered Mental Status, Intoxication with alcohol or substance confusion (Disorientation, impaired judgment, poor safety awaremess, or inability to follow instructions): No  Impaired Mobility: Ambulates or transfers with assistive devices or assistance; Unable to ambulate or transer.: No  Nursing Judgement: No          Lines:   Peripheral IV 03/18/24 Distal;Left;Anterior Antecubital (Active)        Opportunity for questions and clarification was provided.      Patient transported with:  Monitor, Patient-specific medications from Pharmacy, and Tech

## 2024-03-19 NOTE — ANESTHESIA PRE PROCEDURE
03/19/2024 04:04 AM    RDW 13.6 03/19/2024 04:04 AM     03/19/2024 04:04 AM       CMP:   Lab Results   Component Value Date/Time     03/19/2024 04:04 AM    K 3.9 03/19/2024 04:04 AM     03/19/2024 04:04 AM    CO2 25 03/19/2024 04:04 AM    BUN 18 03/19/2024 04:04 AM    CREATININE 1.26 03/19/2024 04:04 AM    GFRAA >60 06/25/2022 05:34 PM    AGRATIO 1.0 03/19/2024 04:04 AM    AGRATIO 0.9 06/25/2022 05:34 PM    LABGLOM >60 03/19/2024 04:04 AM    GLUCOSE 107 03/19/2024 04:04 AM    PROT 5.5 03/19/2024 04:04 AM    CALCIUM 8.1 03/19/2024 04:04 AM    BILITOT 0.7 03/19/2024 04:04 AM    ALKPHOS 50 03/19/2024 04:04 AM    ALKPHOS 75 06/25/2022 05:34 PM    AST 14 03/19/2024 04:04 AM    ALT 16 03/19/2024 04:04 AM       POC Tests: No results for input(s): \"POCGLU\", \"POCNA\", \"POCK\", \"POCCL\", \"POCBUN\", \"POCHEMO\", \"POCHCT\" in the last 72 hours.    Coags:   Lab Results   Component Value Date/Time    PROTIME 10.8 03/18/2024 06:55 AM    INR 1.0 03/18/2024 06:55 AM       HCG (If Applicable): No results found for: \"PREGTESTUR\", \"PREGSERUM\", \"HCG\", \"HCGQUANT\"     ABGs: No results found for: \"PHART\", \"PO2ART\", \"WWA7ODJ\", \"GGY3PSV\", \"BEART\", \"R2FLKFOW\"     Type & Screen (If Applicable):  No results found for: \"LABABO\", \"LABRH\"    Drug/Infectious Status (If Applicable):  Lab Results   Component Value Date/Time    HEPCAB 0.08 03/18/2024 10:46 AM       COVID-19 Screening (If Applicable):   Lab Results   Component Value Date/Time    COVID19 Not detected 03/18/2024 10:29 AM           Anesthesia Evaluation  Patient summary reviewed and Nursing notes reviewed  Airway: Mallampati: II  TM distance: >3 FB   Neck ROM: full  Mouth opening: > = 3 FB   Dental:    (+) poor dentition      Pulmonary: breath sounds clear to auscultation  (+)     sleep apnea:                                  Cardiovascular:  Exercise tolerance: good (>4 METS)  (+) hypertension:, CAD: obstructive      NYHA Classification: II  ECG reviewed  Rhythm:

## 2024-03-19 NOTE — PROGRESS NOTES
[facility]    Hospitalist Progress Note    NAME: Campbell Ingram   :  1955  MRM:  [id number]    Date/Time of service 3/19/2024  11:35 AM    To assist coordination of care and communication with nursing and staff, this note may be preliminary early in the day, but finalized by end of the day.        Assessment and Plan:     Bright red blood per rectum / Melena / Hx hemorrhoids - POA, painless, without SIRS criteria, hypotension, nor anemia.  Likely hemorrhoids vs diverticula.  GI consulted. Plan is for endoscopy today. He reports having an unremarkable EGD and colonoscopy 3 year ago.      Anemia - Noted after hydration, due to dilution and bleeding. Monitor.  Iron low on serologies, suggesting some chronic bleeding.  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  SSI per protocol.  Hold home metformin until after endoscopy. Check A1c.     Coronary artery disease / Hypertension - POA and BP has been low. Continue coreg and rosuvastatin.  Hold amlodipine, lisinopril, ASA and plavix.  PCP to consider consolidating the 3 low dose meds.      Arthritis - Tylenol prn.  No NSAIDS.      Gastroesophageal reflux disease - Continue PPI by IV.       Hx of completed stroke - POA, stable.  For today hold ASA, plavix, but continue statin and lamical      Obese / Obstructive sleep apnea syndrome - Advise weight loss and compliance with CPAP      Benign prostatic hyperplasia - Resume flomax.  Check UA     Chronic kidney disease), stage III - Monitor.  Resume ACE       Subjective:     Chief Complaint:  bleeding has stopped    ROS:  (bold if positive, if negative)    Tolerating PT   NPO        Objective:     Last 24hrs VS reviewed since prior progress note. Most recent are:    Vitals:    24 2150 24 0114 24 0209 24 0434   BP: 103/84 (!) 94/51 102/65 (!) 107/59   Pulse: 87 72  76   Resp: 18    Temp: 97.9 °F (36.6 °C) 97.7 °F (36.5  tablet 650 mg  650 mg Oral Q6H PRN    Or    acetaminophen (TYLENOL) suppository 650 mg  650 mg Rectal Q6H PRN    lactated ringers IV soln infusion   IntraVENous Continuous        Lab Data Reviewed: (see below)  Lab Review:     Recent Labs     03/18/24  0655 03/18/24  1326 03/19/24  0404   WBC 7.3  --  7.4   HGB 14.7 12.9 11.6*   HCT 45.5 39.8 35.9*     --  128*     Recent Labs     03/18/24  0655 03/19/24  0404    140   K 4.1 3.9   * 112*   CO2 29 25   GLUCOSE 114* 107*   BUN 22* 18   CREATININE 1.35* 1.26   CALCIUM 8.5 8.1*   MG  --  1.6   PHOS  --  2.8   LABALBU 3.4* 2.8*   AST 16 14*   ALT 22 16     Lab Results   Component Value Date/Time    GLUCOSE 107 03/19/2024 04:04 AM    GLUCOSE 114 03/18/2024 06:55 AM    GLUCOSE 94 06/25/2022 05:34 PM    GLUCOSE 95 03/14/2022 08:08 AM    GLUCOSE 100 03/13/2022 09:20 PM     No results for input(s): \"PH\", \"PCO2\", \"PO2\", \"HCO3\", \"FIO2\" in the last 72 hours.  Recent Labs     03/18/24  0655   INR 1.0     Results       Procedure Component Value Units Date/Time    Culture, Urine [[id number]] Collected: 03/18/24 1029    Order Status: Sent Specimen: Urine, clean catch Updated: 03/18/24 1502    COVID-19 & Influenza Combo [[id number]] Collected: 03/18/24 1029    Order Status: Completed Specimen: Nasopharyngeal Updated: 03/18/24 1211     SARS-CoV-2, PCR Not detected        Comment: Not Detected results do not preclude SARS-CoV-2 infection and should not be used as the sole basis for patient management decisions.Results must be combined with clinical observations, patient history, and epidemiological information.        Rapid Influenza A By PCR Not detected        Rapid Influenza B By PCR Not detected        Comment: Testing was performed using [employer] SARS-CoV-2 and Influenza A/B nucleic acid assay.  This test is a multiplex Real-Time Reverse Transcriptase Polymerase Chain Reaction (RT-PCR) based in vitro diagnostic test intended for the qualitative detection of  nucleic acids from SARS-CoV-2, Influenza A, and Influenza B in nasopharyngeal and nasal swab specimens for use under the FDA's Emergency Use Authorization (EUA) only.     Fact sheet for Patients: https://www.fda.gov/media/104340/download  Fact sheet for Healthcare Providers: https://www.fda.fov/media/475550/download                 Other pertinent lab: none    Total time spent with patient: 30 Minutes I personally reviewed chart, notes, data and current medications in the medical record.  I have personally examined and treated the patient at bedside during this period.                  Care Plan discussed with: Patient, Care Manager, Nursing Staff, Consultant/Specialist, and >50% of time spent in counseling and coordination of care    Discussed:  Care Plan and D/C Planning    Prophylaxis:  SCD's and H2B/PPI    Disposition:  Home w/Family           ___________________________________________________    Attending Physician: Ephraim Dent MD

## 2024-03-20 ENCOUNTER — APPOINTMENT (OUTPATIENT)
Facility: HOSPITAL | Age: 69
DRG: 379 | End: 2024-03-20
Payer: MEDICARE

## 2024-03-20 PROBLEM — K92.2 GI BLEED: Status: ACTIVE | Noted: 2024-03-20

## 2024-03-20 LAB
BASOPHILS # BLD: 0 K/UL (ref 0–0.1)
BASOPHILS NFR BLD: 1 % (ref 0–1)
DIFFERENTIAL METHOD BLD: ABNORMAL
EOSINOPHIL # BLD: 0.1 K/UL (ref 0–0.4)
EOSINOPHIL NFR BLD: 3 % (ref 0–7)
ERYTHROCYTE [DISTWIDTH] IN BLOOD BY AUTOMATED COUNT: 13.7 % (ref 11.5–14.5)
HCT VFR BLD AUTO: 34.5 % (ref 36.6–50.3)
HGB BLD-MCNC: 11.3 G/DL (ref 12.1–17)
IMM GRANULOCYTES # BLD AUTO: 0 K/UL (ref 0–0.04)
IMM GRANULOCYTES NFR BLD AUTO: 1 % (ref 0–0.5)
LYMPHOCYTES # BLD: 1 K/UL (ref 0.8–3.5)
LYMPHOCYTES NFR BLD: 18 % (ref 12–49)
MCH RBC QN AUTO: 29.6 PG (ref 26–34)
MCHC RBC AUTO-ENTMCNC: 32.8 G/DL (ref 30–36.5)
MCV RBC AUTO: 90.3 FL (ref 80–99)
MONOCYTES # BLD: 0.4 K/UL (ref 0–1)
MONOCYTES NFR BLD: 8 % (ref 5–13)
NEUTS SEG # BLD: 3.9 K/UL (ref 1.8–8)
NEUTS SEG NFR BLD: 69 % (ref 32–75)
NRBC # BLD: 0 K/UL (ref 0–0.01)
NRBC BLD-RTO: 0 PER 100 WBC
PLATELET # BLD AUTO: 132 K/UL (ref 150–400)
PMV BLD AUTO: 10.2 FL (ref 8.9–12.9)
RBC # BLD AUTO: 3.82 M/UL (ref 4.1–5.7)
WBC # BLD AUTO: 5.5 K/UL (ref 4.1–11.1)

## 2024-03-20 PROCEDURE — A4216 STERILE WATER/SALINE, 10 ML: HCPCS | Performed by: EMERGENCY MEDICINE

## 2024-03-20 PROCEDURE — C9113 INJ PANTOPRAZOLE SODIUM, VIA: HCPCS | Performed by: EMERGENCY MEDICINE

## 2024-03-20 PROCEDURE — 6360000002 HC RX W HCPCS: Performed by: EMERGENCY MEDICINE

## 2024-03-20 PROCEDURE — 94761 N-INVAS EAR/PLS OXIMETRY MLT: CPT

## 2024-03-20 PROCEDURE — G0378 HOSPITAL OBSERVATION PER HR: HCPCS

## 2024-03-20 PROCEDURE — 2580000003 HC RX 258: Performed by: INTERNAL MEDICINE

## 2024-03-20 PROCEDURE — 74176 CT ABD & PELVIS W/O CONTRAST: CPT

## 2024-03-20 PROCEDURE — 6360000002 HC RX W HCPCS: Performed by: INTERNAL MEDICINE

## 2024-03-20 PROCEDURE — 36415 COLL VENOUS BLD VENIPUNCTURE: CPT

## 2024-03-20 PROCEDURE — 6370000000 HC RX 637 (ALT 250 FOR IP): Performed by: INTERNAL MEDICINE

## 2024-03-20 PROCEDURE — 2580000003 HC RX 258: Performed by: EMERGENCY MEDICINE

## 2024-03-20 PROCEDURE — 85025 COMPLETE CBC W/AUTO DIFF WBC: CPT

## 2024-03-20 PROCEDURE — 1100000000 HC RM PRIVATE

## 2024-03-20 RX ORDER — MORPHINE SULFATE 2 MG/ML
2 INJECTION, SOLUTION INTRAMUSCULAR; INTRAVENOUS EVERY 6 HOURS PRN
Status: DISCONTINUED | OUTPATIENT
Start: 2024-03-20 | End: 2024-03-21 | Stop reason: HOSPADM

## 2024-03-20 RX ORDER — MORPHINE SULFATE 2 MG/ML
2 INJECTION, SOLUTION INTRAMUSCULAR; INTRAVENOUS EVERY 4 HOURS PRN
Status: DISCONTINUED | OUTPATIENT
Start: 2024-03-20 | End: 2024-03-20

## 2024-03-20 RX ADMIN — ROSUVASTATIN 10 MG: 10 TABLET, FILM COATED ORAL at 21:10

## 2024-03-20 RX ADMIN — SODIUM CHLORIDE, POTASSIUM CHLORIDE, SODIUM LACTATE AND CALCIUM CHLORIDE: 600; 310; 30; 20 INJECTION, SOLUTION INTRAVENOUS at 04:04

## 2024-03-20 RX ADMIN — LAMOTRIGINE 25 MG: 25 TABLET ORAL at 21:11

## 2024-03-20 RX ADMIN — CARVEDILOL 3.12 MG: 3.12 TABLET, FILM COATED ORAL at 08:46

## 2024-03-20 RX ADMIN — PANTOPRAZOLE SODIUM 40 MG: 40 INJECTION, POWDER, FOR SOLUTION INTRAVENOUS at 21:11

## 2024-03-20 RX ADMIN — TAMSULOSIN HYDROCHLORIDE 0.4 MG: 0.4 CAPSULE ORAL at 08:46

## 2024-03-20 RX ADMIN — ACETAMINOPHEN 650 MG: 325 TABLET ORAL at 04:24

## 2024-03-20 RX ADMIN — MORPHINE SULFATE 2 MG: 2 INJECTION, SOLUTION INTRAMUSCULAR; INTRAVENOUS at 11:31

## 2024-03-20 RX ADMIN — PANTOPRAZOLE SODIUM 40 MG: 40 INJECTION, POWDER, FOR SOLUTION INTRAVENOUS at 08:46

## 2024-03-20 RX ADMIN — FERROUS GLUCONATE 324 MG: 324 TABLET ORAL at 08:46

## 2024-03-20 RX ADMIN — CARVEDILOL 3.12 MG: 3.12 TABLET, FILM COATED ORAL at 21:12

## 2024-03-20 RX ADMIN — SODIUM CHLORIDE, PRESERVATIVE FREE 10 ML: 5 INJECTION INTRAVENOUS at 08:46

## 2024-03-20 RX ADMIN — SODIUM CHLORIDE, PRESERVATIVE FREE 10 ML: 5 INJECTION INTRAVENOUS at 21:11

## 2024-03-20 ASSESSMENT — PAIN DESCRIPTION - LOCATION
LOCATION: FLANK
LOCATION: FLANK
LOCATION: ABDOMEN
LOCATION: FLANK

## 2024-03-20 ASSESSMENT — PAIN DESCRIPTION - DESCRIPTORS: DESCRIPTORS: CRAMPING

## 2024-03-20 ASSESSMENT — PAIN DESCRIPTION - ORIENTATION
ORIENTATION: LEFT

## 2024-03-20 ASSESSMENT — PAIN SCALES - GENERAL
PAINLEVEL_OUTOF10: 0
PAINLEVEL_OUTOF10: 0
PAINLEVEL_OUTOF10: 8
PAINLEVEL_OUTOF10: 2
PAINLEVEL_OUTOF10: 8
PAINLEVEL_OUTOF10: 9

## 2024-03-20 NOTE — PLAN OF CARE
Problem: Pain  Goal: Verbalizes/displays adequate comfort level or baseline comfort level  Outcome: Progressing     Problem: Safety - Adult  Goal: Free from fall injury  Outcome: Progressing  Flowsheets (Taken 3/19/2024 1635 by Rosetta Cameron, RN)  Free From Fall Injury:   Instruct family/caregiver on patient safety   Based on caregiver fall risk screen, instruct family/caregiver to ask for assistance with transferring infant if caregiver noted to have fall risk factors     Problem: ABCDS Injury Assessment  Goal: Absence of physical injury  Outcome: Progressing  Flowsheets (Taken 3/19/2024 1635 by Rosetta Cameron, RN)  Absence of Physical Injury: Implement safety measures based on patient assessment     Problem: Chronic Conditions and Co-morbidities  Goal: Patient's chronic conditions and co-morbidity symptoms are monitored and maintained or improved  Outcome: Progressing

## 2024-03-20 NOTE — PROGRESS NOTES
[facility]    Hospitalist Progress Note    NAME: Campbell Ingram   :  1955  MRM:  [id number]    Date/Time of service 3/20/2024  3:53 PM    To assist coordination of care and communication with nursing and staff, this note may be preliminary early in the day, but finalized by end of the day.        Assessment and Plan:     Bright red blood per rectum / Melena / Hx hemorrhoids - POA, painless, without SIRS criteria, hypotension, nor anemia.  Likely hemorrhoids vs diverticula.  GI consulted.  Colonoscopy revealed diverticula, likely cause of bleeding.  Next scope due in 10 years     Anemia - Noted after hydration, due to dilution and bleeding. Monitor.  Iron low on serologies, suggesting some chronic bleeding.  DC on PO iron    Flank pain: History of severe flank pain with renal stones.  Will get CT stone protocol.  Is requiring morphine for pain control.  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  SSI per protocol.  Hold home metformin until after endoscopy.      Coronary artery disease / Hypertension - POA and BP has been low. Continue coreg and rosuvastatin.  Hold amlodipine, lisinopril, ASA and plavix.  PCP to consider consolidating the 3 low dose meds.      Arthritis - Tylenol prn.  No NSAIDS.      Gastroesophageal reflux disease - Continue PPI by IV.       Hx of completed stroke - POA, stable.  For today hold ASA, plavix, but continue statin and lamical      Obese / Obstructive sleep apnea syndrome - Advise weight loss and compliance with CPAP      Benign prostatic hyperplasia - Resume flomax.  Check UA     Chronic kidney disease), stage III - Monitor.  Resume ACE       Subjective:     Chief Complaint:  bleeding has stopped    ROS:  (bold if positive, if negative)    Tolerating PT   NPO        Objective:     Last 24hrs VS reviewed since prior progress note. Most recent are:    Vitals:    24 0404 24 0817  SARS-CoV-2 infection and should not be used as the sole basis for patient management decisions.Results must be combined with clinical observations, patient history, and epidemiological information.        Rapid Influenza A By PCR Not detected        Rapid Influenza B By PCR Not detected        Comment: Testing was performed using [employer] SARS-CoV-2 and Influenza A/B nucleic acid assay.  This test is a multiplex Real-Time Reverse Transcriptase Polymerase Chain Reaction (RT-PCR) based in vitro diagnostic test intended for the qualitative detection of nucleic acids from SARS-CoV-2, Influenza A, and Influenza B in nasopharyngeal and nasal swab specimens for use under the FDA's Emergency Use Authorization (EUA) only.     Fact sheet for Patients: https://www.fda.gov/media/925928/download  Fact sheet for Healthcare Providers: https://www.fda.fov/media/621890/download                 Other pertinent lab: none    Total time spent with patient: 30 Minutes I personally reviewed chart, notes, data and current medications in the medical record.  I have personally examined and treated the patient at bedside during this period.                  Care Plan discussed with: Patient, Care Manager, Nursing Staff, Consultant/Specialist, and >50% of time spent in counseling and coordination of care    Discussed:  Care Plan and D/C Planning    Prophylaxis:  SCD's and H2B/PPI    Disposition:  Home w/Family           ___________________________________________________    Attending Physician: eYsenia Banegas MD

## 2024-03-20 NOTE — PROGRESS NOTES
Reached out to attending for pain medication. Patient has 9-10 right sided flank pain nothing stronger than Tylenol.

## 2024-03-21 VITALS
WEIGHT: 199 LBS | TEMPERATURE: 98.4 F | OXYGEN SATURATION: 96 % | HEIGHT: 66 IN | DIASTOLIC BLOOD PRESSURE: 79 MMHG | RESPIRATION RATE: 17 BRPM | SYSTOLIC BLOOD PRESSURE: 130 MMHG | HEART RATE: 66 BPM | BODY MASS INDEX: 31.98 KG/M2

## 2024-03-21 LAB
ALBUMIN SERPL-MCNC: 2.9 G/DL (ref 3.5–5)
ANION GAP SERPL CALC-SCNC: 3 MMOL/L (ref 5–15)
BASOPHILS # BLD: 0.1 K/UL (ref 0–0.1)
BASOPHILS NFR BLD: 1 % (ref 0–1)
BUN SERPL-MCNC: 10 MG/DL (ref 6–20)
BUN/CREAT SERPL: 8 (ref 12–20)
CALCIUM SERPL-MCNC: 8 MG/DL (ref 8.5–10.1)
CHLORIDE SERPL-SCNC: 112 MMOL/L (ref 97–108)
CO2 SERPL-SCNC: 29 MMOL/L (ref 21–32)
CREAT SERPL-MCNC: 1.27 MG/DL (ref 0.7–1.3)
DIFFERENTIAL METHOD BLD: ABNORMAL
EOSINOPHIL # BLD: 0.2 K/UL (ref 0–0.4)
EOSINOPHIL NFR BLD: 3 % (ref 0–7)
ERYTHROCYTE [DISTWIDTH] IN BLOOD BY AUTOMATED COUNT: 13.4 % (ref 11.5–14.5)
GLUCOSE SERPL-MCNC: 131 MG/DL (ref 65–100)
HCT VFR BLD AUTO: 34 % (ref 36.6–50.3)
HGB BLD-MCNC: 11.2 G/DL (ref 12.1–17)
IMM GRANULOCYTES # BLD AUTO: 0 K/UL (ref 0–0.04)
IMM GRANULOCYTES NFR BLD AUTO: 0 % (ref 0–0.5)
LYMPHOCYTES # BLD: 1.3 K/UL (ref 0.8–3.5)
LYMPHOCYTES NFR BLD: 22 % (ref 12–49)
MAGNESIUM SERPL-MCNC: 1.8 MG/DL (ref 1.6–2.4)
MCH RBC QN AUTO: 29.9 PG (ref 26–34)
MCHC RBC AUTO-ENTMCNC: 32.9 G/DL (ref 30–36.5)
MCV RBC AUTO: 90.9 FL (ref 80–99)
MONOCYTES # BLD: 0.5 K/UL (ref 0–1)
MONOCYTES NFR BLD: 8 % (ref 5–13)
NEUTS SEG # BLD: 3.8 K/UL (ref 1.8–8)
NEUTS SEG NFR BLD: 66 % (ref 32–75)
NRBC # BLD: 0 K/UL (ref 0–0.01)
NRBC BLD-RTO: 0 PER 100 WBC
PHOSPHATE SERPL-MCNC: 3.4 MG/DL (ref 2.6–4.7)
PLATELET # BLD AUTO: 134 K/UL (ref 150–400)
PMV BLD AUTO: 9.7 FL (ref 8.9–12.9)
POTASSIUM SERPL-SCNC: 3.9 MMOL/L (ref 3.5–5.1)
RBC # BLD AUTO: 3.74 M/UL (ref 4.1–5.7)
SODIUM SERPL-SCNC: 144 MMOL/L (ref 136–145)
WBC # BLD AUTO: 5.8 K/UL (ref 4.1–11.1)

## 2024-03-21 PROCEDURE — C9113 INJ PANTOPRAZOLE SODIUM, VIA: HCPCS | Performed by: EMERGENCY MEDICINE

## 2024-03-21 PROCEDURE — 85025 COMPLETE CBC W/AUTO DIFF WBC: CPT

## 2024-03-21 PROCEDURE — 83735 ASSAY OF MAGNESIUM: CPT

## 2024-03-21 PROCEDURE — 80069 RENAL FUNCTION PANEL: CPT

## 2024-03-21 PROCEDURE — 36415 COLL VENOUS BLD VENIPUNCTURE: CPT

## 2024-03-21 PROCEDURE — A4216 STERILE WATER/SALINE, 10 ML: HCPCS | Performed by: EMERGENCY MEDICINE

## 2024-03-21 PROCEDURE — 94761 N-INVAS EAR/PLS OXIMETRY MLT: CPT

## 2024-03-21 PROCEDURE — 6370000000 HC RX 637 (ALT 250 FOR IP): Performed by: INTERNAL MEDICINE

## 2024-03-21 PROCEDURE — 2580000003 HC RX 258: Performed by: EMERGENCY MEDICINE

## 2024-03-21 PROCEDURE — 6360000002 HC RX W HCPCS: Performed by: EMERGENCY MEDICINE

## 2024-03-21 RX ORDER — OXYCODONE HYDROCHLORIDE 5 MG/1
5 TABLET ORAL EVERY 6 HOURS PRN
Qty: 12 TABLET | Refills: 0 | Status: SHIPPED | OUTPATIENT
Start: 2024-03-21 | End: 2024-03-24

## 2024-03-21 RX ADMIN — FERROUS GLUCONATE 324 MG: 324 TABLET ORAL at 08:24

## 2024-03-21 RX ADMIN — CARVEDILOL 3.12 MG: 3.12 TABLET, FILM COATED ORAL at 08:24

## 2024-03-21 RX ADMIN — TAMSULOSIN HYDROCHLORIDE 0.4 MG: 0.4 CAPSULE ORAL at 08:24

## 2024-03-21 RX ADMIN — PANTOPRAZOLE SODIUM 40 MG: 40 INJECTION, POWDER, FOR SOLUTION INTRAVENOUS at 08:23

## 2024-03-21 ASSESSMENT — PAIN SCALES - GENERAL: PAINLEVEL_OUTOF10: 0

## 2024-03-21 NOTE — PLAN OF CARE
Problem: Pain  Goal: Verbalizes/displays adequate comfort level or baseline comfort level  Outcome: Progressing     Problem: Safety - Adult  Goal: Free from fall injury  Outcome: Progressing     Problem: ABCDS Injury Assessment  Goal: Absence of physical injury  Outcome: Progressing     Problem: Chronic Conditions and Co-morbidities  Goal: Patient's chronic conditions and co-morbidity symptoms are monitored and maintained or improved  Outcome: Progressing

## 2024-03-21 NOTE — DISCHARGE SUMMARY
disease), stage III - Monitor.  Resume ACE       PENDING TEST RESULTS:   At the time of discharge the following test results are still pending: None    FOLLOW UP APPOINTMENTS:    Jamel Davila MD  [address] [place] [address]  [facility] [address] [phone]    Schedule an appointment as soon as possible for a visit in 1 week(s)      [facility]  [address]   [place] [address] [phone]  Follow up in 1 month(s)         ADDITIONAL CARE RECOMMENDATIONS: Return to the ER for severe pain, recurrence of blood in stool, vomiting blood or other acute concerns    DIET:  ADULT DIET; Regular    ACTIVITY: activity as tolerated    WOUND CARE: N/A      DISCHARGE MEDICATIONS:     Medication List        START taking these medications      ferrous gluconate 324 (38 Fe) MG tablet  Commonly known as: FERGON  Take 1 tablet by mouth daily (with breakfast)     oxyCODONE 5 MG immediate release tablet  Commonly known as: Roxicodone  Take 1 tablet by mouth every 6 hours as needed for Pain for up to 3 days. Intended supply: 3 days. Take lowest dose possible to manage pain Max Daily Amount: 20 mg            CONTINUE taking these medications      aspirin 81 MG EC tablet     carvedilol 3.125 MG tablet  Commonly known as: COREG     clopidogrel 75 MG tablet  Commonly known as: PLAVIX     lamoTRIgine 25 MG tablet  Commonly known as: LAMICTAL     lisinopril 5 MG tablet  Commonly known as: PRINIVIL;ZESTRIL     nitroGLYCERIN 0.4 MG SL tablet  Commonly known as: NITROSTAT     pantoprazole 40 MG tablet  Commonly known as: PROTONIX     tamsulosin 0.4 MG capsule  Commonly known as: FLOMAX            STOP taking these medications      amLODIPine 5 MG tablet  Commonly known as: NORVASC     cetirizine 10 MG tablet  Commonly known as: ZYRTEC     guaiFENesin 600 MG extended release tablet  Commonly known as: MUCINEX     metFORMIN 500 MG tablet  Commonly known as: GLUCOPHAGE     rosuvastatin  10 MG tablet  Commonly known as: CRESTOR               Where to Get Your Medications        These medications were sent to [id number] - [place], [place] - [address] [address]  [address] - P [phone] - F [phone]  [address] [facility] [address] [address]  [address], [facility] [address]      Phone: [phone]   oxyCODONE 5 MG immediate release tablet       These medications were sent to [id number] - [place], [place] - [address] - P [phone] - F [phone] [address] [address]      Phone: [phone]   ferrous gluconate 324 (38 Fe) MG tablet           NOTIFY YOUR PHYSICIAN FOR ANY OF THE FOLLOWING:   Fever over 101 degrees for 24 hours.   Chest pain, shortness of breath, fever, chills, nausea, vomiting, diarrhea, change in mentation, falling, weakness, bleeding. Severe pain or pain not relieved by medications.  Or, any other signs or symptoms that you may have questions about.    DISPOSITION:       x Home With:   OT  PT  HH  RN       Long term SNF/Inpatient Rehab    Independent/assisted living    Hospice    Other:       PATIENT CONDITION AT DISCHARGE:     Functional status    Poor     Deconditioned    x Independent      Cognition    x Lucid     Forgetful     Dementia      Catheters/lines (plus indication)    Mena     PICC     PEG    x None      Full Code        PHYSICAL EXAMINATION AT DISCHARGE:    General : alert x 3, awake, no acute distress,   HEENT: PEERL, EOMI, moist mucus membrane, TM clear  Neck: supple, no JVD, no meningeal signs  Chest: Clear to auscultation bilaterally   CVS: S1 S2 heard, Capillary refill less than 2 seconds  Abd: soft/ Non tender, non distended, BS physiological,   Ext: no clubbing, no cyanosis, no edema, brisk 2+ DP pulses  Neuro/Psych: pleasant mood and affect, CN 2-12 grossly intact, sensory grossly within normal limit  Skin: warm     CHRONIC MEDICAL DIAGNOSES:      Greater than 31 minutes were spent with the patient on

## 2024-03-31 NOTE — PROGRESS NOTES
Physician Progress Note      PATIENT:               DERIK WILEY  CSN #:                  [id number]  :                       1955  ADMIT DATE:       3/18/2024 6:22 AM  DISCH DATE:        3/21/2024 12:10 PM  RESPONDING  PROVIDER #:        Yesenia Banegas MD          QUERY TEXT:    Good Afternoon    This patient admitted on 2024-2024- for GI bleeding.    Anemia is documented.  Discharge summary notes \"Anemia noted after hydration, due to dilution and   bleeding, Take iron daily,  Recommending ferrous gluconate. \"    f possible, please document in progress notes and discharge summary further   specificity regarding the acuity and type of anemia:    The medical record reflects the following:  Risk Factors: Left colon diverticulosis, most likely source of bleeding  Clinical Indicators: Bright red blood per rectum. hx of Hemorrhoids, Discharge   summary notes \"anemia, noted after hydration due to dilation and bleeding   Admission Hgb @ 14.7---and @ discharge Hgb @ 11.7 + Rectal bleeding-   Colonoscopy noted \" Left colon diverticulosis most likely source of bleeding   that has ceased at this point. Folate was >3.0  Treatment: Hgb/Hct, GI consulted, Colonoscopy, IVF, Discharge summary Ferrous   gluconate daily    Thank you  EDIL Flynn, RN, CPHQ, CCDS, SMART  Options provided:  -- Anemia due to multifactorial cause including iron deficiency, dilutional   anemia, and acute on chronic anemia  -- Anemia due to chronic blood loss and dilutional anemia only  -- Anemia due to acute on chronic blood loss only  -- Dilutional anemia only  -- Other - I will add my own diagnosis  -- Disagree - Not applicable / Not valid  -- Disagree - Clinically unable to determine / Unknown  -- Refer to Clinical Documentation Reviewer    PROVIDER RESPONSE TEXT:    This patient has multifocal cause including iron deficiency anemia.,   dilutional anemia, and acute on chronic anemia    Query created by: Celia Garcia on

## 2025-04-02 ENCOUNTER — TRANSCRIBE ORDERS (OUTPATIENT)
Facility: HOSPITAL | Age: 70
End: 2025-04-02

## 2025-04-02 DIAGNOSIS — I63.9 CEREBRAL INFARCTION, UNSPECIFIED MECHANISM (HCC): Primary | ICD-10-CM

## 2025-04-21 ENCOUNTER — HOSPITAL ENCOUNTER (OUTPATIENT)
Facility: HOSPITAL | Age: 70
Discharge: HOME | End: 2025-04-23
Payer: MEDICARE

## 2025-04-21 DIAGNOSIS — I63.9 CEREBRAL INFARCTION, UNSPECIFIED MECHANISM (HCC): ICD-10-CM

## 2025-04-21 PROCEDURE — 93880 EXTRACRANIAL BILAT STUDY: CPT

## 2025-04-26 LAB
VAS LEFT CCA DIST EDV: 12.1 CM/S
VAS LEFT CCA DIST PSV: 48.8 CM/S
VAS LEFT CCA PROX EDV: 12.5 CM/S
VAS LEFT CCA PROX PSV: 64.6 CM/S
VAS LEFT ECA EDV: 5.7 CM/S
VAS LEFT ECA PSV: 62.6 CM/S
VAS LEFT ICA DIST EDV: 17.5 CM/S
VAS LEFT ICA DIST PSV: 43.1 CM/S
VAS LEFT ICA MID EDV: 18.1 CM/S
VAS LEFT ICA MID PSV: 55.9 CM/S
VAS LEFT ICA PROX EDV: 10.5 CM/S
VAS LEFT ICA PROX PSV: 30.9 CM/S
VAS LEFT ICA/CCA PSV: 1.1 NO UNITS
VAS LEFT SUBCLAVIAN PROX EDV: 0 CM/S
VAS LEFT SUBCLAVIAN PROX PSV: 68.3 CM/S
VAS LEFT VERTEBRAL EDV: 10.4 CM/S
VAS LEFT VERTEBRAL PSV: 30.5 CM/S
VAS RIGHT CCA DIST EDV: 10.1 CM/S
VAS RIGHT CCA DIST PSV: 53.7 CM/S
VAS RIGHT CCA PROX EDV: 14.4 CM/S
VAS RIGHT CCA PROX PSV: 122.2 CM/S
VAS RIGHT ECA EDV: 6.7 CM/S
VAS RIGHT ECA PSV: 55.9 CM/S
VAS RIGHT ICA DIST EDV: 13.2 CM/S
VAS RIGHT ICA DIST PSV: 36 CM/S
VAS RIGHT ICA MID EDV: 15.9 CM/S
VAS RIGHT ICA MID PSV: 50.2 CM/S
VAS RIGHT ICA PROX EDV: 9.9 CM/S
VAS RIGHT ICA PROX PSV: 33.6 CM/S
VAS RIGHT ICA/CCA PSV: 0.9 NO UNITS
VAS RIGHT SUBCLAVIAN PROX EDV: 0 CM/S
VAS RIGHT SUBCLAVIAN PROX PSV: 70.3 CM/S
VAS RIGHT VERTEBRAL EDV: 6 CM/S
VAS RIGHT VERTEBRAL PSV: 24.6 CM/S

## (undated) DEVICE — TUBING HYDR IRR --